# Patient Record
Sex: FEMALE | Race: WHITE | NOT HISPANIC OR LATINO | URBAN - METROPOLITAN AREA
[De-identification: names, ages, dates, MRNs, and addresses within clinical notes are randomized per-mention and may not be internally consistent; named-entity substitution may affect disease eponyms.]

---

## 2017-01-02 ENCOUNTER — TELEPHONE (OUTPATIENT)
Dept: PSYCHIATRY | Facility: CLINIC | Age: 52
End: 2017-01-02

## 2017-01-02 DIAGNOSIS — F90.9 ATTENTION DEFICIT HYPERACTIVITY DISORDER (ADHD), UNSPECIFIED ADHD TYPE: Primary | ICD-10-CM

## 2017-01-02 NOTE — TELEPHONE ENCOUNTER
Received RF request from Pt for:  Ritalin 20mg BID    Last RF:  Per [place]  12/4/16 30 day supply  Per Med tab: 10/20/16 3 copies of rx printed     Due for RF:  yes    Appointment History:  Last appt: 10/21/16  RTC: 1 mo  Cancel: 11/18/16  No-show: none  Next appt: 1/20/16    Routed to provider due to delay RTC timeframe.

## 2017-01-02 NOTE — TELEPHONE ENCOUNTER
----- Message from Sumaya Slater RN sent at 1/2/2017  4:13 PM CST -----  Regarding: FW: Rx refill      ----- Message -----     From: Lauri Xiong     Sent: 1/2/2017   4:10 PM       To: Kaitlynn Zaman RN  Subject: Rx refill                                        Caller:  Pt    Medication:  Ritalin    Pharmacy and location[facility]  [facility] [address] [facility]    Have you contacted the pharmacy: yes    How much of medication do you have left:  Almost out- two days    Pending appt: 1/20/17    Okay to leave VM:  Yes    Pt requests that the script be mailed, address in system is correct.

## 2017-01-03 RX ORDER — METHYLPHENIDATE HYDROCHLORIDE EXTENDED RELEASE 20 MG/1
20 TABLET ORAL 2 TIMES DAILY
Qty: 60 TABLET | Refills: 0 | Status: SHIPPED | OUTPATIENT
Start: 2017-01-03 | End: 2017-01-20

## 2017-01-03 NOTE — TELEPHONE ENCOUNTER
Bree Arredondo MD     Sent: Tue January 03, 2017 10:57 AM       To: Ayla Lopez, RN              Message        AVERY I'm covering Yuki               Ok to do 30 day supply.

## 2017-01-03 NOTE — TELEPHONE ENCOUNTER
LVM for patient to let her know I would put the RX in the mail to be sent to her home address. Instructed her to call the clinic back if she would like us to do something else with the rx.

## 2017-01-20 ENCOUNTER — OFFICE VISIT (OUTPATIENT)
Dept: PSYCHIATRY | Facility: CLINIC | Age: 52
End: 2017-01-20
Attending: PSYCHIATRY & NEUROLOGY
Payer: COMMERCIAL

## 2017-01-20 VITALS — WEIGHT: 281.2 LBS | SYSTOLIC BLOOD PRESSURE: 116 MMHG | DIASTOLIC BLOOD PRESSURE: 78 MMHG | HEART RATE: 76 BPM

## 2017-01-20 DIAGNOSIS — F41.1 GENERALIZED ANXIETY DISORDER: ICD-10-CM

## 2017-01-20 DIAGNOSIS — F33.0 MAJOR DEPRESSIVE DISORDER, RECURRENT EPISODE, MILD (H): ICD-10-CM

## 2017-01-20 DIAGNOSIS — F90.9 ATTENTION DEFICIT HYPERACTIVITY DISORDER (ADHD), UNSPECIFIED ADHD TYPE: Primary | ICD-10-CM

## 2017-01-20 PROCEDURE — 99212 OFFICE O/P EST SF 10 MIN: CPT | Mod: ZF

## 2017-01-20 RX ORDER — METHYLPHENIDATE HYDROCHLORIDE EXTENDED RELEASE 20 MG/1
20 TABLET ORAL 2 TIMES DAILY
Qty: 60 TABLET | Refills: 0 | Status: SHIPPED | OUTPATIENT
Start: 2017-03-03 | End: 2017-01-20

## 2017-01-20 RX ORDER — METHYLPHENIDATE HYDROCHLORIDE EXTENDED RELEASE 20 MG/1
20 TABLET ORAL 2 TIMES DAILY
Qty: 60 TABLET | Refills: 0 | Status: SHIPPED | OUTPATIENT
Start: 2017-02-03 | End: 2017-04-13

## 2017-01-20 RX ORDER — METHYLPHENIDATE HYDROCHLORIDE EXTENDED RELEASE 20 MG/1
20 TABLET ORAL 2 TIMES DAILY
Qty: 60 TABLET | Refills: 0 | Status: SHIPPED | OUTPATIENT
Start: 2017-03-03 | End: 2017-07-27

## 2017-01-20 RX ORDER — GABAPENTIN 100 MG/1
CAPSULE ORAL
Qty: 210 CAPSULE | Refills: 5 | Status: SHIPPED | OUTPATIENT
Start: 2017-01-20 | End: 2017-04-24

## 2017-01-20 RX ORDER — METHYLPHENIDATE HYDROCHLORIDE EXTENDED RELEASE 20 MG/1
20 TABLET ORAL 2 TIMES DAILY
Qty: 60 TABLET | Refills: 0 | Status: SHIPPED | OUTPATIENT
Start: 2017-02-03 | End: 2017-01-20

## 2017-01-20 RX ORDER — ESCITALOPRAM OXALATE 20 MG/1
20 TABLET ORAL DAILY
Qty: 30 TABLET | Refills: 5 | Status: SHIPPED | OUTPATIENT
Start: 2017-01-20 | End: 2017-04-24

## 2017-01-20 NOTE — NURSING NOTE
Chief Complaint   Patient presents with     Recheck Medication   MDD    Reviewed allergies, smoking status, and pharmacy preference  Administered abuse screening questions   Obtained weight, blood pressure and heart rate
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
## 2017-04-13 ENCOUNTER — TELEPHONE (OUTPATIENT)
Dept: PSYCHIATRY | Facility: CLINIC | Age: 52
End: 2017-04-13

## 2017-04-13 DIAGNOSIS — F90.9 ATTENTION DEFICIT HYPERACTIVITY DISORDER (ADHD), UNSPECIFIED ADHD TYPE: ICD-10-CM

## 2017-04-13 RX ORDER — METHYLPHENIDATE HYDROCHLORIDE EXTENDED RELEASE 20 MG/1
20 TABLET ORAL 2 TIMES DAILY
Qty: 60 TABLET | Refills: 0 | Status: SHIPPED | OUTPATIENT
Start: 2017-04-13 | End: 2017-04-24

## 2017-04-13 NOTE — TELEPHONE ENCOUNTER
-Script signed by Dr. Browne  -Mailed to pt's home address:    [address] [place] [address]  -Pt notified via   -Clinic number provided for c/b if needed before next appt.

## 2017-04-13 NOTE — TELEPHONE ENCOUNTER
FW: Refill Request  Received: Today       Sumaya Slater, Leah Manley RN       Phone Number: [phone]                       Previous Messages       ----- Message -----      From: Shannon Thorpe      Sent: 4/13/2017  10:53 AM        To: Kaitlynn Zaman RN   Subject: Refill Request                                   Caller:  patient   Medication:  Ritalin 20 mg   Pharmacy and location:  Mailed to home address (confirmed in EPIC)   Have you contacted the pharmacy: na   How much of medication do you have left:  Almost out   Pending appt: 4/24/17   Okay to leave VM:  yes

## 2017-04-13 NOTE — TELEPHONE ENCOUNTER
Last seen: 1/20/17  RTC: 2 months  Cancel: 2/17/17 (d/t illness)  No-show: none  Next appt: 4/24/17    Incoming refill from pt via phone    Medication requested: Metadate ER 20 mg tab  Directions: Take 1 tab BID  Qty: 60  Last refilled: 3/15/17    Will notify Dr. Bowman to seek approval to RF d/t missed appt.

## 2017-04-13 NOTE — TELEPHONE ENCOUNTER
Message  Received: Today       Maria D Bowman MD Blake, Katherine J RN       Caller: Unspecified (Today, 12:09 PM)                     I am fine with it. Pls print under Dr Browne.

## 2017-04-24 ENCOUNTER — OFFICE VISIT (OUTPATIENT)
Dept: PSYCHIATRY | Facility: CLINIC | Age: 52
End: 2017-04-24
Attending: PSYCHIATRY & NEUROLOGY
Payer: COMMERCIAL

## 2017-04-24 VITALS — WEIGHT: 219.6 LBS | DIASTOLIC BLOOD PRESSURE: 80 MMHG | SYSTOLIC BLOOD PRESSURE: 120 MMHG | HEART RATE: 86 BPM

## 2017-04-24 DIAGNOSIS — F90.9 ATTENTION DEFICIT HYPERACTIVITY DISORDER (ADHD), UNSPECIFIED ADHD TYPE: ICD-10-CM

## 2017-04-24 DIAGNOSIS — F33.0 MAJOR DEPRESSIVE DISORDER, RECURRENT EPISODE, MILD (H): ICD-10-CM

## 2017-04-24 DIAGNOSIS — F41.1 GENERALIZED ANXIETY DISORDER: ICD-10-CM

## 2017-04-24 PROCEDURE — 99212 OFFICE O/P EST SF 10 MIN: CPT | Mod: ZF

## 2017-04-24 RX ORDER — METHYLPHENIDATE HYDROCHLORIDE EXTENDED RELEASE 20 MG/1
20 TABLET ORAL 2 TIMES DAILY
Qty: 60 TABLET | Refills: 0 | Status: SHIPPED | OUTPATIENT
Start: 2017-06-24 | End: 2017-08-01

## 2017-04-24 RX ORDER — METHYLPHENIDATE HYDROCHLORIDE EXTENDED RELEASE 20 MG/1
20 TABLET ORAL 2 TIMES DAILY
Qty: 60 TABLET | Refills: 0 | Status: SHIPPED | OUTPATIENT
Start: 2017-05-24 | End: 2017-04-24

## 2017-04-24 RX ORDER — ESCITALOPRAM OXALATE 20 MG/1
20 TABLET ORAL DAILY
Qty: 30 TABLET | Refills: 5 | Status: SHIPPED | OUTPATIENT
Start: 2017-04-24 | End: 2017-08-01

## 2017-04-24 RX ORDER — METHYLPHENIDATE HYDROCHLORIDE EXTENDED RELEASE 20 MG/1
20 TABLET ORAL 2 TIMES DAILY
Qty: 60 TABLET | Refills: 0 | Status: SHIPPED | OUTPATIENT
Start: 2017-04-24 | End: 2017-04-24

## 2017-04-24 RX ORDER — GABAPENTIN 100 MG/1
CAPSULE ORAL
Qty: 210 CAPSULE | Refills: 5 | Status: SHIPPED | OUTPATIENT
Start: 2017-04-24 | End: 2017-08-01
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
## 2017-04-24 NOTE — MR AVS SNAPSHOT
After Visit Summary   4/24/2017    yJothi Pierre    MRN: [id number]           Patient Information     Date Of Birth          1965        Visit Information        Provider Department      4/24/2017 3:30 PM Maria D Bowman MD Psychiatry Clinic [facility]      Today's Diagnoses     Attention deficit hyperactivity disorder (ADHD), unspecified ADHD type        Generalized anxiety disorder        Major depressive disorder, recurrent episode, mild (H)           Follow-ups after your visit        Your next 10 appointments already scheduled     Jun 14, 2017  8:15 AM CDT   Adult Med Follow UP with Maria D Bowman MD   Psychiatry Clinic ([facility])    [address]  [address] [facility] [address] [phone]              Who to contact     Please call your clinic at [phone] to:    Ask questions about your health    Make or cancel appointments    Discuss your medicines    Learn about your test results    Speak to your doctor   If you have compliments or concerns about an experience at your clinic, or if you wish to file a complaint, please contact [facility] Patient Relations at [phone] or email us at [email or web address]         Additional Information About Your Visit        MyChart Information     [employer] gives you secure access to your electronic health record. If you see a primary care provider, you can also send messages to your care team and make appointments. If you have questions, please call your primary care clinic.  If you do not have a primary care provider, please call [phone] and they will assist you.      PPLCONNECT is an electronic gateway that provides easy, online access to your medical records. With PPLCONNECT, you can request a clinic appointment, read your test results, renew a prescription or communicate with your care team.     To access your existing account,  please contact your [facility] or call [phone] for assistance.        Care EveryWhere ID     This is your Care EveryWhere ID. This could be used by other organizations to access your [place] medical records  [id number]        Your Vitals Were     Pulse                   86            Blood Pressure from Last 3 Encounters:   04/24/17 120/80   01/20/17 116/78   10/21/16 118/80    Weight from Last 3 Encounters:   04/24/17 99.6 kg (219 lb 9.6 oz)   01/20/17 127.6 kg (281 lb 3.2 oz)   10/21/16 96.9 kg (213 lb 9.6 oz)              Today, you had the following     No orders found for display         Today's Medication Changes          These changes are accurate as of: 4/24/17 11:59 PM.  If you have any questions, ask your nurse or doctor.               These medicines have changed or have updated prescriptions.        Dose/Directions    * methylphenidate 20 MG CR tablet   Commonly known as:  METADATE ER   This may have changed:  Another medication with the same name was added. Make sure you understand how and when to take each.   Used for:  Attention deficit hyperactivity disorder (ADHD), unspecified ADHD type   Changed by:  Maria D Bowman MD        Dose:  20 mg   Take 1 tablet (20 mg) by mouth 2 times daily In the morning and at noon   Quantity:  60 tablet   Refills:  0       * methylphenidate 20 MG CR tablet   Commonly known as:  METADATE ER   This may have changed:  You were already taking a medication with the same name, and this prescription was added. Make sure you understand how and when to take each.   Used for:  Attention deficit hyperactivity disorder (ADHD), unspecified ADHD type   Changed by:  Maria D Bowman MD        Dose:  20 mg   Start taking on:  6/24/2017   Take 1 tablet (20 mg) by mouth 2 times daily In the morning and at noon   Quantity:  60 tablet   Refills:  0       * Notice:  This list has 2 medication(s) that are the same as other  medications prescribed for you. Read the directions carefully, and ask your doctor or other care provider to review them with you.         Where to get your medicines      These medications were sent to [id number] - [place] - [address] AT [facility] [address]  [address] [place] [address]     Phone:  [phone]     escitalopram 20 MG tablet    gabapentin 100 MG capsule         Some of these will need a paper prescription and others can be bought over the counter.  Ask your nurse if you have questions.     Bring a paper prescription for each of these medications     methylphenidate 20 MG CR tablet                Primary Care Provider Office Phone # Fax #    Jn Betts [phone]       [facility] [address] [place] [address]        Thank you!     Thank you for choosing PSYCHIATRY CLINIC  for your care. Our goal is always to provide you with excellent care. Hearing back from our patients is one way we can continue to improve our services. Please take a few minutes to complete the written survey that you may receive in the mail after your visit with us. Thank you!             Your Updated Medication List - Protect others around you: Learn how to safely use, store and throw away your medicines at www.disposemymeds.org.          This list is accurate as of: 4/24/17 11:59 PM.  Always use your most recent med list.                   Brand Name Dispense Instructions for use    BENADRYL PO      Take 50 mg by mouth At Bedtime       escitalopram 20 MG tablet    LEXAPRO    30 tablet    Take 1 tablet (20 mg) by mouth daily       gabapentin 100 MG capsule    NEURONTIN    210 capsule    Take 2 capsules (200 mg) in the morning, 3 capsules (300 mg) at noon, and 2 capsules (200 mg) at bedtime       * methylphenidate 20 MG CR tablet    METADATE ER    60 tablet    Take 1 tablet (20 mg) by mouth 2 times daily In the morning and at noon        * methylphenidate 20 MG CR tablet   Start taking on:  6/24/2017    METADATE ER    60 tablet    Take 1 tablet (20 mg) by mouth 2 times daily In the morning and at noon       order for DME     1 Device    Equipment being ordered: full spectrum 10,000 lux light. Use 30 min every morning in fall and winter months.       * Notice:  This list has 2 medication(s) that are the same as other medications prescribed for you. Read the directions carefully, and ask your doctor or other care provider to review them with you.

## 2017-06-03 ENCOUNTER — HEALTH MAINTENANCE LETTER (OUTPATIENT)
Age: 52
End: 2017-06-03

## 2017-07-24 ENCOUNTER — TELEPHONE (OUTPATIENT)
Dept: PSYCHIATRY | Facility: CLINIC | Age: 52
End: 2017-07-24

## 2017-07-24 DIAGNOSIS — F90.9 ATTENTION DEFICIT HYPERACTIVITY DISORDER (ADHD), UNSPECIFIED ADHD TYPE: ICD-10-CM

## 2017-07-24 NOTE — TELEPHONE ENCOUNTER
Last seen: 4/24/17 (Gracie)  RTC:  2 months  Cancel: none  No-show: 6/14/17  Next appt: 8/1/17 (tx to [facility])     Per last office note:  Continue Metadate to 20 mg BID ER tablet - not capsule. (3 months prescription sent with pt today)    Per [place]-, 30 d/s Metadate last filled: 6/17/17, 5/26/17, 4/17/17    Routed to Fior HUGGINS CNP
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
## 2017-07-27 RX ORDER — METHYLPHENIDATE HYDROCHLORIDE EXTENDED RELEASE 20 MG/1
20 TABLET ORAL 2 TIMES DAILY
Qty: 10 TABLET | Refills: 0 | Status: SHIPPED | OUTPATIENT
Start: 2017-07-27 | End: 2017-08-01

## 2017-07-27 NOTE — TELEPHONE ENCOUNTER
Following consultation with Dr. Bowman.  Fior authorizes refill just until next appt.    Script printed and routed to Fior Bazan for signature.

## 2017-07-28 NOTE — TELEPHONE ENCOUNTER
Rx signed by Fior Bazan.    Left detailed VM for pt with update that Rx is signed and writer is requesting a c/b to see if she would like to p/u Rx or have this mailed.

## 2017-08-01 ENCOUNTER — OFFICE VISIT (OUTPATIENT)
Dept: PSYCHIATRY | Facility: CLINIC | Age: 52
End: 2017-08-01
Attending: NURSE PRACTITIONER
Payer: COMMERCIAL

## 2017-08-01 VITALS — SYSTOLIC BLOOD PRESSURE: 125 MMHG | DIASTOLIC BLOOD PRESSURE: 85 MMHG | HEART RATE: 76 BPM | WEIGHT: 217 LBS

## 2017-08-01 DIAGNOSIS — F41.1 GENERALIZED ANXIETY DISORDER: ICD-10-CM

## 2017-08-01 DIAGNOSIS — F33.0 MAJOR DEPRESSIVE DISORDER, RECURRENT EPISODE, MILD (H): ICD-10-CM

## 2017-08-01 DIAGNOSIS — F90.9 ATTENTION DEFICIT HYPERACTIVITY DISORDER (ADHD), UNSPECIFIED ADHD TYPE: ICD-10-CM

## 2017-08-01 PROCEDURE — 99212 OFFICE O/P EST SF 10 MIN: CPT | Mod: ZF

## 2017-08-01 RX ORDER — METHYLPHENIDATE HYDROCHLORIDE EXTENDED RELEASE 20 MG/1
20 TABLET ORAL 2 TIMES DAILY
Qty: 60 TABLET | Refills: 0 | Status: SHIPPED | OUTPATIENT
Start: 2017-08-01 | End: 2017-09-22

## 2017-08-01 RX ORDER — GABAPENTIN 100 MG/1
CAPSULE ORAL
Qty: 210 CAPSULE | Refills: 1 | Status: SHIPPED | OUTPATIENT
Start: 2017-08-01 | End: 2017-09-22

## 2017-08-01 RX ORDER — ESCITALOPRAM OXALATE 20 MG/1
20 TABLET ORAL DAILY
Qty: 30 TABLET | Refills: 1 | Status: SHIPPED | OUTPATIENT
Start: 2017-08-01 | End: 2017-09-22
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
## 2017-08-01 NOTE — TELEPHONE ENCOUNTER
Fior Bazan, APRN [facility], JONATAN                     She never called you back re: the partial fill. I just saw her.     She accepted two full months of print for Metadate. Can you destroy the RX from 7/27/17?         Script shredded.

## 2017-08-01 NOTE — MR AVS SNAPSHOT
After Visit Summary   8/1/2017    Jyothi Pierre    MRN: [id number]           Patient Information     Date Of Birth          1965        Visit Information        Provider Department      8/1/2017 1:00 PM Fior Bazan APRN [facility]        Today's Diagnoses     Attention deficit hyperactivity disorder (ADHD), unspecified ADHD type        Generalized anxiety disorder        Major depressive disorder, recurrent episode, mild (H)           Follow-ups after your visit        Follow-up notes from your care team     Return in about 8 weeks (around 9/26/2017), or if symptoms worsen or fail to improve.      Who to contact     Please call your clinic at [phone] to:    Ask questions about your health    Make or cancel appointments    Discuss your medicines    Learn about your test results    Speak to your doctor   If you have compliments or concerns about an experience at your clinic, or if you wish to file a complaint, please contact [facility] Patient Relations at [phone] or email us at [email or web address]         Additional Information About Your Visit        MyChart Information     Ts[employer]ink gives you secure access to your electronic health record. If you see a primary care provider, you can also send messages to your care team and make appointments. If you have questions, please call your primary care clinic.  If you do not have a primary care provider, please call [phone] and they will assist you.      Tsukulink is an electronic gateway that provides easy, online access to your medical records. With [employer]ink, you can request a clinic appointment, read your test results, renew a prescription or communicate with your care team.     To access your existing account, please contact your [facility] or call [phone] for assistance.        Care EveryWhere ID     This is your Care EveryWhere ID. This could be  used by other organizations to access your [place] medical records  [id number]        Your Vitals Were     Pulse                   76            Blood Pressure from Last 3 Encounters:   08/01/17 125/85   04/24/17 120/80   01/20/17 116/78    Weight from Last 3 Encounters:   08/01/17 98.4 kg (217 lb)   04/24/17 99.6 kg (219 lb 9.6 oz)   01/20/17 127.6 kg (281 lb 3.2 oz)              Today, you had the following     No orders found for display         Where to get your medicines      These medications were sent to [id number] - [place] - [address] [facility] [address]  [address] [address]     Phone:  [phone]     escitalopram 20 MG tablet    gabapentin 100 MG capsule         Some of these will need a paper prescription and others can be bought over the counter.  Ask your nurse if you have questions.     Bring a paper prescription for each of these medications     methylphenidate 20 MG CR tablet    methylphenidate 20 MG CR tablet          Primary Care Provider Office Phone # Fax #    Jn Betts [phone]       [facility] [address] [place] [address]        Equal Access to Services     CARLOS LYNN AH: Hadii chio alyo Sograemeali, waaxda luqadaha, qaybta kaalmada adeegyada, monserrat falcon haymeghan jensen. So [facility] [phone].    ATENCIÓN: Si habla español, tiene a santana disposición servicios gratuitos de asistencia lingüística. ame al [phone].    We comply with applicable federal civil rights laws and [place] laws. We do not discriminate on the basis of race, color, national origin, age, disability sex, sexual orientation or gender identity.            Thank you!     Thank you for choosing PSYCHIATRY CLINIC  for your care. Our goal is always to provide you with excellent care. Hearing back from our patients is one way we can continue to improve our services. Please take a few minutes to  complete the written survey that you may receive in the mail after your visit with us. Thank you!             Your Updated Medication List - Protect others around you: Learn how to safely use, store and throw away your medicines at www.disposemymeds.org.          This list is accurate as of: 8/1/17 11:59 PM.  Always use your most recent med list.                   Brand Name Dispense Instructions for use Diagnosis    BENADRYL PO      Take 50 mg by mouth At Bedtime        escitalopram 20 MG tablet    LEXAPRO    30 tablet    Take 1 tablet (20 mg) by mouth daily    Major depressive disorder, recurrent episode, mild (H)       gabapentin 100 MG capsule    NEURONTIN    210 capsule    Take 2 capsules (200 mg) in the morning, 3 capsules (300 mg) at noon, and 2 capsules (200 mg) at bedtime    Generalized anxiety disorder       * methylphenidate 20 MG CR tablet    METADATE ER    60 tablet    Take 1 tablet (20 mg) by mouth 2 times daily In the morning and at noon    Attention deficit hyperactivity disorder (ADHD), unspecified ADHD type       * methylphenidate 20 MG CR tablet    METADATE ER    60 tablet    Take 1 tablet (20 mg) by mouth 2 times daily In the morning and at noon    Attention deficit hyperactivity disorder (ADHD), unspecified ADHD type       order for DME     1 Device    Equipment being ordered: full spectrum 10,000 lux light. Use 30 min every morning in fall and winter months.    Major depressive disorder, recurrent episode, mild (H)       * Notice:  This list has 2 medication(s) that are the same as other medications prescribed for you. Read the directions carefully, and ask your doctor or other care provider to review them with you.

## 2017-08-01 NOTE — NURSING NOTE
Chief Complaint   Patient presents with     Recheck Medication     ADHD    Reviewed allergies, smoking status, and pharmacy preference  Administered abuse screening question  Obtained weight, blood pressure and heart rate

## 2017-08-02 ASSESSMENT — PATIENT HEALTH QUESTIONNAIRE - PHQ9: SUM OF ALL RESPONSES TO PHQ QUESTIONS 1-9: 7

## 2017-09-19 ENCOUNTER — TELEPHONE (OUTPATIENT)
Dept: PSYCHIATRY | Facility: CLINIC | Age: 52
End: 2017-09-19

## 2017-09-19 NOTE — TELEPHONE ENCOUNTER
"Shannon Thorpe Michelle, RN        Phone Number: [phone]                     Caller:  patient   Medication:  gabapentin   Pharmacy and location:  [facility]   Have you contacted the pharmacy: no   How much of medication do you have left:  \"Out/almost out/should have been out a while ago\"   Pending appt: 9/22/17   Okay to leave VM:  yes     Patient says that she did not contact her pharmacy because they are very \"disorganized right now\"       Last seen: 8/1/17  RTC: 8 weeks  Cancel: none  No-show: none  Next appt: 9/22/17     Last prescribed:    gabapentin (NEURONTIN) 100 MG capsule 210 capsule 1 8/1/2017  No   Sig: Take 2 capsules (200 mg) in the morning, 3 capsules (300 mg) at noon, and 2 capsules (200 mg) at bedtime   Class: E-Prescribe     [facility] have [facility].  Writer was informed that pt obtained #210 - 100 mg caps of Gabapentin on 8/23/17 and 8/7/17.  Pt should not be out of medication at this time.  Pharmacy also notes that there are several remaining refills under Dr. Hwang on profile.    Returned call to pt who states that she does not need a refill at this time and has enough to get to next appt with provider.  Again states that her pharmacy is very disorganized.  Pt appears to be at work as multiple voices were overheard in background, did not inquire further due to potential privacy issue.    Routed to provider as AVERY   "

## 2017-09-22 ENCOUNTER — OFFICE VISIT (OUTPATIENT)
Dept: PSYCHIATRY | Facility: CLINIC | Age: 52
End: 2017-09-22
Attending: NURSE PRACTITIONER
Payer: COMMERCIAL

## 2017-09-22 VITALS — SYSTOLIC BLOOD PRESSURE: 126 MMHG | WEIGHT: 214.8 LBS | HEART RATE: 88 BPM | DIASTOLIC BLOOD PRESSURE: 86 MMHG

## 2017-09-22 DIAGNOSIS — F41.1 GENERALIZED ANXIETY DISORDER: ICD-10-CM

## 2017-09-22 DIAGNOSIS — F90.9 ATTENTION DEFICIT HYPERACTIVITY DISORDER (ADHD), UNSPECIFIED ADHD TYPE: ICD-10-CM

## 2017-09-22 DIAGNOSIS — F33.0 MAJOR DEPRESSIVE DISORDER, RECURRENT EPISODE, MILD (H): ICD-10-CM

## 2017-09-22 PROCEDURE — 99212 OFFICE O/P EST SF 10 MIN: CPT | Mod: ZF

## 2017-09-22 RX ORDER — METHYLPHENIDATE HYDROCHLORIDE EXTENDED RELEASE 20 MG/1
20 TABLET ORAL 2 TIMES DAILY
Qty: 60 TABLET | Refills: 0 | Status: SHIPPED | OUTPATIENT
Start: 2017-09-22 | End: 2017-11-21

## 2017-09-22 RX ORDER — ESCITALOPRAM OXALATE 20 MG/1
20 TABLET ORAL DAILY
Qty: 30 TABLET | Refills: 1 | Status: SHIPPED | OUTPATIENT
Start: 2017-09-22 | End: 2017-11-21

## 2017-09-22 RX ORDER — GABAPENTIN 100 MG/1
CAPSULE ORAL
Qty: 210 CAPSULE | Refills: 1 | Status: SHIPPED | OUTPATIENT
Start: 2017-09-22 | End: 2017-11-21

## 2017-09-22 NOTE — PATIENT INSTRUCTIONS
Thank you for coming to the PSYCHIATRY CLINIC.    Lab Testing:  If you had lab testing today and your results are reassuring or normal they will be mailed to you or sent through [employer] within 7 days.   If the lab tests need quick action we will call you with the results.  The phone number we will call with results is # [phone] (home) . If this is not the best number please call our clinic and change the number.    Medication Refills:  If you need any refills please call your pharmacy and they will contact us. Our fax number for refills is [phone]. Please allow three business for refill processing.   If you need to  your refill at a new pharmacy, please contact the new pharmacy directly. The new pharmacy will help you get your medications transferred.     Scheduling:  If you have any concerns about today's visit or wish to schedule another appointment please call our office during normal business hours [phone] (8-5:00 M-F)    Contact Us:  Please call [phone] during business hours (8-5:00 M-F).  If after clinic hours, or on the weekend, please call  [phone].    Financial Assistance [phone]  [facility] Billing [phone]  [facility] Billing [phone]  Medical Records [phone]      MENTAL HEALTH CRISIS NUMBERS:  [facility]:   [facility] - [phone]   Crisis Residence [facility] - [phone]   Walk-In Counseling [facility] [phone]   COPE 24/7 [facility] Mobile Team for Adults - [[phone]]; Child - [[phone]]     Crisis Connection - [phone]     [facility]:   [facility] - [phone]   Walk-in counseling [facility] - [phone]   Walk-in counseling [facility] - [phone]   Crisis Residence [facility] - [phone]   Urgent Care Adult Mental Health:   --Drop-in, 24/7 crisis line, and [employer] [employer] Mobile Team [[phone]]    CRISIS TEXT  LINE: Text [phone] from anywhere, anytime, any crisis 24/7;    OR SEE www.crisistextline.org     Poison Control Center - [phone]    CHILD: [facility] needs assessment team - [phone]     [facility] - [phone]; or [employer] Lifeline - [phone]    If you have a medical emergency please call 911or go to the nearest ER.                    _____________________________________________    Again thank you for choosing PSYCHIATRY CLINIC and please let us know how we can best partner with you to improve you and your family's health.  You may be receiving a survey in the mail regarding this appointment. We would love to have your feedback, both positive and negative, so please fill out the survey and return it using the provided envolpe. The survey is done by an external company, so your answers are anonymous.

## 2017-09-22 NOTE — PROGRESS NOTES
Outpatient Psychiatry Progress Note     Provider: JOSELUIS Roldan CNP  Date: 2017  Service:  Medication management.   Patient Identification: Jyothi Pierre  : 1965   MRN: [id number]    Jyothi Pierre is a 52 year old female who presents for ongoing psychiatric care.  Jyothi Pierre was last seen in clinic on 17 for first med visit. She was previously seen in resident clinic, most recently by Dr. Bowman.      At that time, she chose to continue Metadate ER 20 mg BID, Gabapentin 200 mg qAM and qHS with 300 mg qNoon, Lexapro 20 mg daily, Benadryl 50 mg qHS PRN.    2017  Today Jyothi reports she started back to school as a  in 6th, 7th, 8th grades specializing in EBD. She just got a positive evaluation from a supervisor up a couple levels for her position who was concerned she was working too hard and skipping her lunch.     Grieving the loss of a family friend who  last week in a MVA. She reports her grief response is often a delayed emotional response.    Enjoyed traveling in [place] with her  this summer.    Her 26yo daughter is starting her Master's in Education with a focus on Autism. Her 26yo daughter has her first grandchild, August, two months ago. Her 21yo son is a genoveva at [employer].    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 She continues physical therapy for her back.    She reports daily med compliance and denies side effects of medication.    Psychiatric Review of Systems:  Overall, her mood is pretty good. She is concerned that gabapentin causes her to gain weight; she takes 100mg (2qAM, 3qNoon, 2qHS for anxiety).    Depression: denies including irritability  Anxiety: dissociates in the shower s/t trauma history unless she talks herself through every step    She denies lethality.     Review of Medical Systems:  Appetite: really good, weight is stable within 3#  Sleep: with CPAP and Benadryl PRN, sleeps intact all  night  Self Care: encouraged, wants to restart walking, does wood working with her , enjoys her Yazdanism  Housework and hygiene: managing  Energy: intact  Concentration: intact    Current Substance Use:  Social History     Social History     Marital status:      Spouse name: N/A     Number of children: N/A     Years of education: N/A     Social History Main Topics     Smoking status: Former Smoker     Types: Cigarettes     Quit date: 4/1/2013     Smokeless tobacco: None      Comment: quit last week (4/1/13)     Alcohol use No     Drug use: No     Sexual activity: Not Asked     Other Topics Concern     None     Social History Narrative     Nicotine: quit in 2012  Alcohol: sober since 2004    Limiting caffeine in favor of carbonated water    Past Medical History:   Past Medical History:   Diagnosis Date     Chronic kidney disease      Depressive disorder, not elsewhere classified 11/8/2012     Renal colic      Medical health update: physical therapy weekly for her back; anticipates starting weekly traction    Allergies:   Allergies   Allergen Reactions     Nubain [Nalbuphine Hcl]      Percocet [Oxycodone-Acetaminophen] Swelling     Toradol Other (See Comments)     Headache     Adderall Rash     Compazine Anxiety        Current Medications     Current Outpatient Prescriptions Ordered in [facility]   Medication Sig Dispense Refill     methylphenidate (METADATE ER) 20 MG CR tablet Take 1 tablet (20 mg) by mouth 2 times daily In the morning and at noon 60 tablet 0     gabapentin (NEURONTIN) 100 MG capsule Take 2 capsules (200 mg) in the morning, 3 capsules (300 mg) at noon, and 2 capsules (200 mg) at bedtime 210 capsule 1     escitalopram (LEXAPRO) 20 MG tablet Take 1 tablet (20 mg) by mouth daily 30 tablet 1     methylphenidate (METADATE ER) 20 MG CR tablet Take 1 tablet (20 mg) by mouth 2 times daily In the morning and at noon 60 tablet 0     order for DME Equipment being ordered: full spectrum 10,000 lux light.  "Use 30 min every morning in fall and winter months. 1 Device 0     DiphenhydrAMINE HCl (BENADRYL PO) Take 50 mg by mouth At Bedtime       No current Epic-ordered facility-administered medications on file.       Mental Status Exam     Appearance:  Casually dressed and Adequately groomed  Behavior/relationship to examiner/demeanor:  Cooperative, Engaged and Pleasant  Orientation: Oriented to person, place, time and situation  Psychomotor: WNL  Speech Rate:  Normal  Speech Spontaneity:  Normal  Mood:  \"good\"  Affect:  Appropriate/mood-congruent  Thought Process (Associations):  Logical, Linear and Goal directed  Thought Content:  no evidence of suicidal or homicidal ideation, denies suicidal ideation, intent or thoughts and patient denies auditory and visual hallucinations  Abnormal Perception:  None  Attention/Concentration:  Normal  Language:  Intact  Insight:  Good  Judgment:  Good      Results     Vital signs: /86  Pulse 88  Wt 97.4 kg (214 lb 12.8 oz)    Laboratory Data:   Lab Results   Component Value Date    WBC 5.0 12/11/2015    HGB 14.5 12/11/2015    HCT 43.1 12/11/2015     12/11/2015    ALT 30 12/11/2015    AST 15 12/11/2015     12/11/2015    BUN 11 12/11/2015    CO2 28 12/11/2015    TSH 1.99 12/11/2015    INR 1.11 04/06/2010     Assessment & Plan      Jyothi Pierre is seen today for follow up.    Diagnosis  Axis 1: PTSD, ADHD  Axis 2: BPD per history    Plan:  Medication: continue Metadate ER 20 mg BID, Gabapentin 200 mg qAM and qHS with 300 mg qNoon, Lexapro 20 mg daily  OTC Recommendations: Benadryl 50mg qHS PRN  Lab Orders: none  Referrals: she considers rescheduling with Cristela  Release of Information: none  Future Treatment Considerations: none  Return for Follow Up: 8 weeks, sooner as needd    She voices understanding of the treatment plan including discussion of options, risks/ benefits. She has clinic contact information and will seek emergency services if urgent or life " threatening symptoms present. She understands risks associated with drug and alcohol use.     Over 50% of this time was spent counseling the patient and/or coordinating care regarding review of social and occupational functioning.  In addition patient was counseled on health and wellness practices to augment medication treatment of symptoms. See note for details.    PHQ-9 score:  2  PHQ-9 SCORE 8/1/2017   Total Score -   Total Score 7     GAD7: No flowsheet data found.  : 09/2017  JOSELUIS Roldan CNP 9/22/2017

## 2017-09-22 NOTE — MR AVS SNAPSHOT
After Visit Summary   9/22/2017    Jyothi Pierre    MRN: [id number]           Patient Information     Date Of Birth          1965        Visit Information        Provider Department      9/22/2017 4:00 PM Fior Bazan APRN [facility]        Today's Diagnoses     Attention deficit hyperactivity disorder (ADHD), unspecified ADHD type        Generalized anxiety disorder        Major depressive disorder, recurrent episode, mild (H)          Care Instructions    Thank you for coming to the PSYCHIATRY CLINIC.    Lab Testing:  If you had lab testing today and your results are reassuring or normal they will be mailed to you or sent through [email or web address] within 7 days.   If the lab tests need quick action we will call you with the results.  The phone number we will call with results is # [phone] (home) . If this is not the best number please call our clinic and change the number.    Medication Refills:  If you need any refills please call your pharmacy and they will contact us. Our fax number for refills is [phone]. Please allow three business for refill processing.   If you need to  your refill at a new pharmacy, please contact the new pharmacy directly. The new pharmacy will help you get your medications transferred.     Scheduling:  If you have any concerns about today's visit or wish to schedule another appointment please call our office during normal business hours [phone] (8-5:00 M-F)    Contact Us:  Please call [phone] during business hours (8-5:00 M-F).  If after clinic hours, or on the weekend, please call  [phone].    Financial Assistance [phone]  [facility] Billing [phone]  [place] Billing [phone]  Medical Records [phone]      MENTAL HEALTH CRISIS NUMBERS:  [facility]:   [facility] - [phone]   Crisis Residence [facility]idence - [phone]   Walk-In Counseling Center [facility] - [phone]    COPE 24/7 [facility] Mobile Team for Adults - [[phone]]; Child - [[phone]]     Crisis Connection - [phone]     [facility] - [phone]   Walk-in counseling [facility] - [phone]   Walk-in counseling [facility] - [phone]   Crisis Residence [facility] Residence - [phone]   Urgent Care Adult Mental Health:   --Drop-in, 24/7 crisis line, and [place] Mobile Team [[phone]]    CRISIS TEXT LINE: Text [phone] from anywhere, anytime, any crisis 24/7;    OR SEE www.crisistextline.org     Poison Control Center - [phone]    CHILD: [facility] needs assessment team - [phone]     [facility] Lifeline - [phone]; or [employer] Lifeline - [phone]    If you have a medical emergency please call 911or go to the nearest ER.                    _____________________________________________    Again thank you for choosing PSYCHIATRY CLINIC and please let us know how we can best partner with you to improve you and your family's health.  You may be receiving a survey in the mail regarding this appointment. We would love to have your feedback, both positive and negative, so please fill out the survey and return it using the provided envolpe. The survey is done by an external company, so your answers are anonymous.             Follow-ups after your visit        Your next 10 appointments already scheduled     Nov 24, 2017  4:00 PM CST   Adult Med Follow UP with JOSELUIS Panchal CNP   Psychiatry Clinic ([facility])    [address] [facility] [address] [phone]              Who to contact     Please call your clinic at [phone] to:    Ask questions about your health    Make or cancel appointments    Discuss your medicines    Learn about your test results    Speak to your doctor   If you have compliments or concerns about an experience  at your clinic, or if you wish to file a complaint, please contact [facility] Patient Relations at [phone] or email us at [email or web address]         Additional Information About Your Visit        [employer] Information     [employer]to gives you secure access to your electronic health record. If you see a primary care provider, you can also send messages to your care team and make appointments. If you have questions, please call your primary care clinic.  If you do not have a primary care provider, please call [phone] and they will assist you.      B[employer]to is an electronic gateway that provides easy, online access to your medical records. With [employer]to, you can request a clinic appointment, read your test results, renew a prescription or communicate with your care team.     To access your existing account, please contact your [facility] Clinic or call [phone] for assistance.        Care EveryWhere ID     This is your Care EveryWhere ID. This could be used by other organizations to access your [place] medical records  [id number]        Your Vitals Were     Pulse                   88            Blood Pressure from Last 3 Encounters:   09/22/17 126/86   08/01/17 125/85   04/24/17 120/80    Weight from Last 3 Encounters:   09/22/17 97.4 kg (214 lb 12.8 oz)   08/01/17 98.4 kg (217 lb)   04/24/17 99.6 kg (219 lb 9.6 oz)              Today, you had the following     No orders found for display         Where to get your medicines      These medications were sent to [facility] [id number]  [place] - [address] [facility] [address]  [address] [place] [address]     Phone:  [phone]     escitalopram 20 MG tablet    gabapentin 100 MG capsule         Some of these will need a paper prescription and others can be bought over the counter.  Ask your nurse if you have questions.     Bring a paper prescription for each of  these medications     methylphenidate 20 MG CR tablet    methylphenidate 20 MG CR tablet          Primary Care Provider Office Phone # Fax #    Jn Betts [phone]       [facility] [address] [place] [address]        Equal Access to Services     [facility] LEAH : Hadii aad ku hadasho Soomaali, waaxda luqadaha, qaybta kaalmada adeegyada, waxay odessain hayaan adehumberto guilloryrosyjohana khan . So [facility] [phone].    ATENCIÓN: Si habla español, tiene a santana disposición servicios gratuitos de asistencia lingüística. [facility] [phone].    We comply with applicable federal civil rights laws and [place] laws. We do not discriminate on the basis of race, color, national origin, age, disability sex, sexual orientation or gender identity.            Thank you!     Thank you for choosing PSYCHIATRY CLINIC  for your care. Our goal is always to provide you with excellent care. Hearing back from our patients is one way we can continue to improve our services. Please take a few minutes to complete the written survey that you may receive in the mail after your visit with us. Thank you!             Your Updated Medication List - Protect others around you: Learn how to safely use, store and throw away your medicines at www.disposemymeds.org.          This list is accurate as of: 9/22/17  4:40 PM.  Always use your most recent med list.                   Brand Name Dispense Instructions for use Diagnosis    BENADRYL PO      Take 50 mg by mouth At Bedtime        escitalopram 20 MG tablet    LEXAPRO    30 tablet    Take 1 tablet (20 mg) by mouth daily    Major depressive disorder, recurrent episode, mild (H)       gabapentin 100 MG capsule    NEURONTIN    210 capsule    Take 2 capsules (200 mg) in the morning, 3 capsules (300 mg) at noon, and 2 capsules (200 mg) at bedtime    Generalized anxiety disorder       * methylphenidate 20 MG CR tablet    METADATE ER    60 tablet    Take 1 tablet (20  mg) by mouth 2 times daily In the morning and at noon    Attention deficit hyperactivity disorder (ADHD), unspecified ADHD type       * methylphenidate 20 MG CR tablet    METADATE ER    60 tablet    Take 1 tablet (20 mg) by mouth 2 times daily In the morning and at noon    Attention deficit hyperactivity disorder (ADHD), unspecified ADHD type       order for DME     1 Device    Equipment being ordered: full spectrum 10,000 lux light. Use 30 min every morning in fall and winter months.    Major depressive disorder, recurrent episode, mild (H)       * Notice:  This list has 2 medication(s) that are the same as other medications prescribed for you. Read the directions carefully, and ask your doctor or other care provider to review them with you.

## 2017-09-25 ASSESSMENT — PATIENT HEALTH QUESTIONNAIRE - PHQ9: SUM OF ALL RESPONSES TO PHQ QUESTIONS 1-9: 2

## 2017-11-21 ENCOUNTER — OFFICE VISIT (OUTPATIENT)
Dept: PSYCHIATRY | Facility: CLINIC | Age: 52
End: 2017-11-21
Attending: NURSE PRACTITIONER
Payer: COMMERCIAL

## 2017-11-21 VITALS — WEIGHT: 219.4 LBS | HEART RATE: 81 BPM | DIASTOLIC BLOOD PRESSURE: 81 MMHG | SYSTOLIC BLOOD PRESSURE: 116 MMHG

## 2017-11-21 DIAGNOSIS — F90.9 ATTENTION DEFICIT HYPERACTIVITY DISORDER (ADHD), UNSPECIFIED ADHD TYPE: ICD-10-CM

## 2017-11-21 DIAGNOSIS — F41.1 GENERALIZED ANXIETY DISORDER: ICD-10-CM

## 2017-11-21 DIAGNOSIS — F33.0 MAJOR DEPRESSIVE DISORDER, RECURRENT EPISODE, MILD (H): ICD-10-CM

## 2017-11-21 PROCEDURE — 99212 OFFICE O/P EST SF 10 MIN: CPT | Mod: ZF

## 2017-11-21 RX ORDER — METHYLPHENIDATE HYDROCHLORIDE EXTENDED RELEASE 20 MG/1
20 TABLET ORAL 2 TIMES DAILY
Qty: 60 TABLET | Refills: 0 | Status: SHIPPED | OUTPATIENT
Start: 2017-11-21 | End: 2018-01-30

## 2017-11-21 RX ORDER — METHYLPHENIDATE HYDROCHLORIDE EXTENDED RELEASE 20 MG/1
20 TABLET ORAL 2 TIMES DAILY
Qty: 60 TABLET | Refills: 0 | Status: SHIPPED | OUTPATIENT
Start: 2017-11-21 | End: 2018-02-09

## 2017-11-21 RX ORDER — ESCITALOPRAM OXALATE 20 MG/1
20 TABLET ORAL DAILY
Qty: 30 TABLET | Refills: 1 | Status: SHIPPED | OUTPATIENT
Start: 2017-11-21 | End: 2018-01-23

## 2017-11-21 RX ORDER — GABAPENTIN 100 MG/1
CAPSULE ORAL
Qty: 210 CAPSULE | Refills: 1 | Status: SHIPPED | OUTPATIENT
Start: 2017-11-21 | End: 2018-02-09
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
## 2017-11-21 NOTE — MR AVS SNAPSHOT
After Visit Summary   11/21/2017    Jyothi Pierre    MRN: [id number]           Patient Information     Date Of Birth          1965        Visit Information        Provider Department      11/21/2017 3:00 PM Fior Bazan APRN [facility]        Today's Diagnoses     Attention deficit hyperactivity disorder (ADHD), unspecified ADHD type        Major depressive disorder, recurrent episode, mild (H)        Generalized anxiety disorder           Follow-ups after your visit        Follow-up notes from your care team     Return in about 8 weeks (around 1/16/2018), or if symptoms worsen or fail to improve, for Routine Visit.      Who to contact     Please call your clinic at [phone] to:    Ask questions about your health    Make or cancel appointments    Discuss your medicines    Learn about your test results    Speak to your doctor   If you have compliments or concerns about an experience at your clinic, or if you wish to file a complaint, please contact [facility] Patient Relations at [phone] or email us at [email or web address]         Additional Information About Your Visit        MyChart Information     Twillion gives you secure access to your electronic health record. If you see a primary care provider, you can also send messages to your care team and make appointments. If you have questions, please call your primary care clinic.  If you do not have a primary care provider, please call [phone] and they will assist you.      Twillion is an electronic gateway that provides easy, online access to your medical records. With [employer], you can request a clinic appointment, read your test results, renew a prescription or communicate with your care team.     To access your existing account, please contact your [facility] or call [phone] for assistance.        Care EveryWhere ID     This is your Care  EveryWhere ID. This could be used by other organizations to access your [place] medical records  [id number]        Your Vitals Were     Pulse                   81            Blood Pressure from Last 3 Encounters:   11/21/17 116/81   09/22/17 126/86   08/01/17 125/85    Weight from Last 3 Encounters:   11/21/17 99.5 kg (219 lb 6.4 oz)   09/22/17 97.4 kg (214 lb 12.8 oz)   08/01/17 98.4 kg (217 lb)              Today, you had the following     No orders found for display         Where to get your medicines      These medications were sent to [facility] [id number]  [place] - [email or web address] [facility] [employer] [employer]  [facility] [address] [address]     Phone:  [phone]     escitalopram 20 MG tablet    gabapentin 100 MG capsule         Some of these will need a paper prescription and others can be bought over the counter.  Ask your nurse if you have questions.     Bring a paper prescription for each of these medications     methylphenidate 20 MG CR tablet    methylphenidate 20 MG CR tablet          Primary Care Provider Office Phone # Fax #    Jn Betts [phone]       [facility] [address] [place] [address]        Equal Access to Services     CARL[facility] LYNN AH: Hadii chio ku hadasho Soomaali, waaxda luqadaha, qaybta kaalmada adeegyada, monserrat falcon haymeghan jensen. So [facility] [phone].    ATENCIÓN: Si habla español, tiene a santana disposición servicios gratuitos de asistencia lingüística. Alejandro al [phone].    We comply with applicable federal civil rights laws and [place] laws. We do not discriminate on the basis of race, color, national origin, age, disability, sex, sexual orientation, or gender identity.            Thank you!     Thank you for choosing [facility] CLINIC  for your care. Our goal is always to provide you with excellent care. Hearing back from our patients is one way we can continue to improve our services.  Please take a few minutes to complete the written survey that you may receive in the mail after your visit with us. Thank you!             Your Updated Medication List - Protect others around you: Learn how to safely use, store and throw away your medicines at www.disposemymeds.org.          This list is accurate as of: 11/21/17 11:59 PM.  Always use your most recent med list.                   Brand Name Dispense Instructions for use Diagnosis    BENADRYL PO      Take 50 mg by mouth At Bedtime        escitalopram 20 MG tablet    LEXAPRO    30 tablet    Take 1 tablet (20 mg) by mouth daily    Major depressive disorder, recurrent episode, mild (H)       gabapentin 100 MG capsule    NEURONTIN    210 capsule    Take 2 capsules (200 mg) in the morning, 3 capsules (300 mg) at noon, and 2 capsules (200 mg) at bedtime    Generalized anxiety disorder       * methylphenidate 20 MG CR tablet    METADATE ER    60 tablet    Take 1 tablet (20 mg) by mouth 2 times daily In the morning and at noon    Attention deficit hyperactivity disorder (ADHD), unspecified ADHD type       * methylphenidate 20 MG CR tablet    METADATE ER    60 tablet    Take 1 tablet (20 mg) by mouth 2 times daily In the morning and at noon    Attention deficit hyperactivity disorder (ADHD), unspecified ADHD type       order for DME     1 Device    Equipment being ordered: full spectrum 10,000 lux light. Use 30 min every morning in fall and winter months.    Major depressive disorder, recurrent episode, mild (H)       * Notice:  This list has 2 medication(s) that are the same as other medications prescribed for you. Read the directions carefully, and ask your doctor or other care provider to review them with you.

## 2018-01-23 DIAGNOSIS — F33.0 MAJOR DEPRESSIVE DISORDER, RECURRENT EPISODE, MILD (H): ICD-10-CM

## 2018-01-23 RX ORDER — ESCITALOPRAM OXALATE 20 MG/1
20 TABLET ORAL DAILY
Qty: 30 TABLET | Refills: 0 | Status: SHIPPED | OUTPATIENT
Start: 2018-01-23 | End: 2018-02-09

## 2018-01-23 NOTE — TELEPHONE ENCOUNTER
Medication requested: Lexapro 20 mg tabs  Last refilled: 12-20-17  Qty: 30      Last seen: 11-21-17  RTC: 8 wks  Cancel: 0  No-show: 0  Next appt: none    Refill decision: 30 day mila refill sent to the pharmacy - including instructions for patient to call the clinic and schedule an appointment.      Scheduling has been notified to contact the pt for appointment.      Kathleen M Doege RN

## 2018-01-29 ENCOUNTER — TELEPHONE (OUTPATIENT)
Dept: PSYCHIATRY | Facility: CLINIC | Age: 53
End: 2018-01-29

## 2018-01-29 DIAGNOSIS — F90.9 ATTENTION DEFICIT HYPERACTIVITY DISORDER (ADHD), UNSPECIFIED ADHD TYPE: ICD-10-CM

## 2018-01-29 NOTE — TELEPHONE ENCOUNTER
Last seen: 11/21/17  RTC: 8 weeks  Cancel: none  No-show: none  Next appt: 2/9/18     Per last office note: continue Metadate ER 20 mg BID    Per [place]-, 30 d/s Metadate last filled: 1/3/18, 12/2/17, 11/1/17    Routed to provider for auth to RF per RF protocol

## 2018-01-29 NOTE — TELEPHONE ENCOUNTER
----- Message from Brandy Restrepo sent at 1/29/2018 10:52 AM CST -----  Regarding: Med Refill  Contact: [phone]  Caller:  Pt  Medication:  Ritalin 20mg  Pharmacy and location:  Mail to home  Have you contacted the pharmacy: N/A  How much of medication do you have left:  1 day  Pending appt: 2/9  Okay to leave VM:  Yes

## 2018-01-30 RX ORDER — METHYLPHENIDATE HYDROCHLORIDE EXTENDED RELEASE 20 MG/1
20 TABLET ORAL 2 TIMES DAILY
Qty: 60 TABLET | Refills: 0 | Status: SHIPPED | OUTPATIENT
Start: 2018-01-30 | End: 2018-02-09

## 2018-02-09 ENCOUNTER — OFFICE VISIT (OUTPATIENT)
Dept: PSYCHIATRY | Facility: CLINIC | Age: 53
End: 2018-02-09
Attending: NURSE PRACTITIONER
Payer: COMMERCIAL

## 2018-02-09 VITALS — SYSTOLIC BLOOD PRESSURE: 130 MMHG | WEIGHT: 216 LBS | HEART RATE: 76 BPM | DIASTOLIC BLOOD PRESSURE: 84 MMHG

## 2018-02-09 DIAGNOSIS — F33.0 MAJOR DEPRESSIVE DISORDER, RECURRENT EPISODE, MILD (H): ICD-10-CM

## 2018-02-09 DIAGNOSIS — F90.9 ATTENTION DEFICIT HYPERACTIVITY DISORDER (ADHD), UNSPECIFIED ADHD TYPE: ICD-10-CM

## 2018-02-09 DIAGNOSIS — F41.1 GENERALIZED ANXIETY DISORDER: ICD-10-CM

## 2018-02-09 PROCEDURE — G0463 HOSPITAL OUTPT CLINIC VISIT: HCPCS | Mod: ZF

## 2018-02-09 RX ORDER — ESCITALOPRAM OXALATE 20 MG/1
20 TABLET ORAL DAILY
Qty: 30 TABLET | Refills: 2 | Status: SHIPPED | OUTPATIENT
Start: 2018-02-09 | End: 2018-06-02

## 2018-02-09 RX ORDER — METHYLPHENIDATE HYDROCHLORIDE EXTENDED RELEASE 20 MG/1
20 TABLET ORAL 2 TIMES DAILY
Qty: 60 TABLET | Refills: 0 | Status: SHIPPED | OUTPATIENT
Start: 2018-02-09 | End: 2018-06-15

## 2018-02-09 RX ORDER — METHYLPHENIDATE HYDROCHLORIDE EXTENDED RELEASE 20 MG/1
20 TABLET ORAL 2 TIMES DAILY
Qty: 60 TABLET | Refills: 0 | Status: SHIPPED | OUTPATIENT
Start: 2018-02-09 | End: 2018-05-07

## 2018-02-09 RX ORDER — GABAPENTIN 100 MG/1
CAPSULE ORAL
Qty: 210 CAPSULE | Refills: 2 | Status: SHIPPED | OUTPATIENT
Start: 2018-02-09 | End: 2018-06-15

## 2018-02-09 RX ORDER — FLUTICASONE PROPIONATE 220 UG/1
1 AEROSOL, METERED RESPIRATORY (INHALATION) 2 TIMES DAILY
COMMUNITY

## 2018-02-09 ASSESSMENT — PAIN SCALES - GENERAL: PAINLEVEL: MILD PAIN (3)

## 2018-02-09 NOTE — NURSING NOTE
Chief Complaint   Patient presents with     Recheck Medication     ADHD     Reviewed allergies, medications, pharmacy and smoking status.  Administered abuse screening questions     Obtained weight, pain level, blood pressure and heart rate

## 2018-02-09 NOTE — MR AVS SNAPSHOT
After Visit Summary   2/9/2018    Jyothi Pierre    MRN: [id number]           Patient Information     Date Of Birth          1965        Visit Information        Provider Department      2/9/2018 7:30 AM Fior Bazan APRN [facility]        Today's Diagnoses     Attention deficit hyperactivity disorder (ADHD), unspecified ADHD type        Generalized anxiety disorder        Major depressive disorder, recurrent episode, mild (H)           Follow-ups after your visit        Follow-up notes from your care team     Return in about 8 weeks (around 4/6/2018), or if symptoms worsen or fail to improve, for Routine Visit.      Who to contact     Please call your clinic at [phone] to:    Ask questions about your health    Make or cancel appointments    Discuss your medicines    Learn about your test results    Speak to your doctor            Additional Information About Your Visit        MyChart Information     [employer] gives you secure access to your electronic health record. If you see a primary care provider, you can also send messages to your care team and make appointments. If you have questions, please call your primary care clinic.  If you do not have a primary care provider, please call [phone] and they will assist you.      Wedivite is an electronic gateway that provides easy, online access to your medical records. With [employer], you can request a clinic appointment, read your test results, renew a prescription or communicate with your care team.     To access your existing account, please contact your [facility] or call [phone] for assistance.        Care EveryWhere ID     This is your Care EveryWhere ID. This could be used by other organizations to access your [place] medical records  [id number]        Your Vitals Were     Pulse                   76            Blood Pressure from Last 3 Encounters:   02/09/18 130/84   11/21/17 116/81    09/22/17 126/86    Weight from Last 3 Encounters:   02/09/18 98 kg (216 lb)   11/21/17 99.5 kg (219 lb 6.4 oz)   09/22/17 97.4 kg (214 lb 12.8 oz)              Today, you had the following     No orders found for display         Where to get your medicines      These medications were sent to [facility] [id number]  [place], [place] - [address] AT [facility] [address] [place] [address]     Phone:  [phone]     escitalopram 20 MG tablet    gabapentin 100 MG capsule         Some of these will need a paper prescription and others can be bought over the counter.  Ask your nurse if you have questions.     Bring a paper prescription for each of these medications     methylphenidate 20 MG CR tablet    methylphenidate 20 MG CR tablet          Primary Care Provider Office Phone # Fax #    Jn Betts [phone]       [facility] [address] [place] [address]        Equal Access to Services     [facility]: Hadii aad ku hadasho Sogiuseppe, waaxda luqadaha, qaybta kaalmada ademigdalia, monserrat khan . So [facility] [phone].    ATENCIÓN: Si habla español, tiene a santana disposición servicios gratuitos de asistencia lingüística. Alejandro al [phone].    We comply with applicable federal civil rights laws and [place] laws. We do not discriminate on the basis of race, color, national origin, age, disability, sex, sexual orientation, or gender identity.            Thank you!     Thank you for choosing [facility]  for your care. Our goal is always to provide you with excellent care. Hearing back from our patients is one way we can continue to improve our services. Please take a few minutes to complete the written survey that you may receive in the mail after your visit with us. Thank you!             Your Updated Medication List - Protect others around you: Learn how to safely use, store and throw away your  medicines at www.disposemymeds.org.          This list is accurate as of 2/9/18 11:59 PM.  Always use your most recent med list.                   Brand Name Dispense Instructions for use Diagnosis    BENADRYL PO      Take 50 mg by mouth At Bedtime        escitalopram 20 MG tablet    LEXAPRO    30 tablet    Take 1 tablet (20 mg) by mouth daily    Major depressive disorder, recurrent episode, mild (H)       fluticasone 220 MCG/ACT Inhaler    FLOVENT HFA     Inhale 1 puff into the lungs 2 times daily        gabapentin 100 MG capsule    NEURONTIN    210 capsule    Take 2 capsules (200 mg) in the morning, 3 capsules (300 mg) at noon, and 2 capsules (200 mg) at bedtime    Generalized anxiety disorder       iloprost 10 MCG/ML Soln solution    VENTAVIS          * methylphenidate 20 MG CR tablet    METADATE ER    60 tablet    Take 1 tablet (20 mg) by mouth 2 times daily In the morning and at noon    Attention deficit hyperactivity disorder (ADHD), unspecified ADHD type       * methylphenidate 20 MG CR tablet    METADATE ER    60 tablet    Take 1 tablet (20 mg) by mouth 2 times daily In the morning and at noon    Attention deficit hyperactivity disorder (ADHD), unspecified ADHD type       order for DME     1 Device    Equipment being ordered: full spectrum 10,000 lux light. Use 30 min every morning in fall and winter months.    Major depressive disorder, recurrent episode, mild (H)       * Notice:  This list has 2 medication(s) that are the same as other medications prescribed for you. Read the directions carefully, and ask your doctor or other care provider to review them with you.

## 2018-02-09 NOTE — PROGRESS NOTES
"  Outpatient Psychiatry Progress Note     Provider: JOSELUIS Roldan CNP  Date: 2018  Service:  Medication management.   Patient Identification: Jyothi Pierre  : 1965   MRN: [id number]    Jyothi Pierre is a 52 year old female who presents for ongoing psychiatric care.  Jyothi was last seen in clinic on 17. At that time, she chose to continue Metadate ER 20mg BID, gabapentin 200mg QAM, QHS and 300mg QNoon with Lexapro 20mg daily.    2018  Today Jyothi reports she is doing well.  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  - she enjoys watching her adult daughter, Sleena parent raising her 6month old grandson.  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  - enjoys her 115# Renee Vasqeus and 100# lab, Jan    Compliance: daily  Side effects: denies    Psychiatric Review of Systems:  Depression: \"its better\"  SAD: using light box inconsistently, brainstormed ways to improve compliance  Anxiety: triggered by certain smells and in the shower from past trauma    Lethality: denies    Review of Medical Systems:  Appetite: OK, 3# weight loss  Sleep: sleeping well  Self Care: encouraged, enjoys time with family  Housework and hygiene: managing as is normal for her  Energy: intact  Concentration: intact    Current Substance Use:    Social History     Social History     Marital status:      Spouse name: N/A     Number of children: N/A     Years of education: N/A     Social History Main Topics     Smoking status: Former Smoker     Types: Cigarettes     Quit date: 2013     Smokeless tobacco: Never Used      Comment: quit last week (13)     Alcohol use No     Drug use: No     Sexual activity: Not Asked " "    Other Topics Concern     None     Social History Narrative     Quit smoking in 2012. Stopped drinking in 2004. Limiting caffeine.     Past Medical History:   Past Medical History:   Diagnosis Date     Chronic kidney disease      Depressive disorder, not elsewhere classified 11/8/2012     Renal colic      Medical health update: none today    Allergies:   Allergies   Allergen Reactions     Nubain [Nalbuphine Hcl]      Percocet [Oxycodone-Acetaminophen] Swelling     Toradol Other (See Comments)     Headache     Adderall Rash     Compazine Anxiety        Current Medications     Current Outpatient Prescriptions Ordered in [facility]   Medication Sig Dispense Refill     iloprost (VENTAVIS) 10 MCG/ML SOLN solution        fluticasone (FLOVENT HFA) 220 MCG/ACT Inhaler Inhale 1 puff into the lungs 2 times daily       methylphenidate (METADATE ER) 20 MG CR tablet Take 1 tablet (20 mg) by mouth 2 times daily In the morning and at noon 60 tablet 0     escitalopram (LEXAPRO) 20 MG tablet Take 1 tablet (20 mg) by mouth daily 30 tablet 0     methylphenidate (METADATE ER) 20 MG CR tablet Take 1 tablet (20 mg) by mouth 2 times daily In the morning and at noon 60 tablet 0     gabapentin (NEURONTIN) 100 MG capsule Take 2 capsules (200 mg) in the morning, 3 capsules (300 mg) at noon, and 2 capsules (200 mg) at bedtime 210 capsule 1     order for DME Equipment being ordered: full spectrum 10,000 lux light. Use 30 min every morning in fall and winter months. 1 Device 0     DiphenhydrAMINE HCl (BENADRYL PO) Take 50 mg by mouth At Bedtime       No current Epic-ordered facility-administered medications on file.       Mental Status Exam     Appearance:  Casually dressed and Well groomed, appears stated age  Behavior/relationship to examiner/demeanor:  Cooperative, Engaged and Pleasant  Orientation: Oriented to person, place, time and situation  Psychomotor: WNL  Speech Rate:  Normal  Speech Spontaneity:  Normal  Mood:  \"good\"  Affect:  " Appropriate/mood-congruent and Bright  Thought Process (Associations):  Logical, Linear and Goal directed  Thought Content:  no evidence of suicidal or homicidal ideation, no overt psychosis, denies suicidal ideation, intent or thoughts, patient denies auditory and visual hallucinations, patient does not appear to be responding to internal stimuli and denies suicidal intent or plan  Abnormal Perception:  None  Attention/Concentration:  Normal  Language:  Intact  Insight:  Good  Judgment:  Good      Results     Vital signs: /84  Pulse 76  Wt 98 kg (216 lb)    Laboratory Data:   Lab Results   Component Value Date    WBC 5.0 12/11/2015    HGB 14.5 12/11/2015    HCT 43.1 12/11/2015     12/11/2015    ALT 30 12/11/2015    AST 15 12/11/2015     12/11/2015    BUN 11 12/11/2015    CO2 28 12/11/2015    TSH 1.99 12/11/2015    INR 1.11 04/06/2010     Assessment & Plan     Jyothi is seen today for follow up.  - monitor need for light box for SAD  - encouraged exercise and dog training as she is able    Diagnosis  Axis 1: PTSD, MDD, ADHD, SAD  Axis 2: none     Plan:  Medication: continue Metadate ER 20mg BID, Gabapentin 200mg qAM, qHS with 300 mg QNoon, Lexapro 20mg daily  OTC Recommendations: Benadryl PRN for sleep  Other: encouraged physical activity as she tolerates  Lab Orders: none  Referrals: none, sees Dr. Enriquez as needed  Release of Information:   Future Treatment Considerations: none  Return for Follow Up: 8 weeks, sooner as needed    She voices understanding of the treatment plan including discussion of options, risks/ benefits. She has clinic contact information and will seek emergency services if urgent or life threatening symptoms present. Jyothi understands risks associated with drug and alcohol use.     Visit was spent counseling the patient and/or coordinating care regarding review of social and occupational functioning.  In addition patient was counseled on health and wellness practices to  augment medication treatment of symptoms. See note for details.    PHQ-9 score:    PHQ-9 SCORE 9/22/2017   Total Score -   Total Score 2     GAD7: No flowsheet data found.  : 11/2017  JOSELUIS Roldan CNP 2/9/2018

## 2018-05-07 ENCOUNTER — TELEPHONE (OUTPATIENT)
Dept: PSYCHIATRY | Facility: CLINIC | Age: 53
End: 2018-05-07

## 2018-05-07 DIAGNOSIS — F90.9 ATTENTION DEFICIT HYPERACTIVITY DISORDER (ADHD), UNSPECIFIED ADHD TYPE: ICD-10-CM

## 2018-05-07 RX ORDER — METHYLPHENIDATE HYDROCHLORIDE EXTENDED RELEASE 20 MG/1
20 TABLET ORAL 2 TIMES DAILY
Qty: 60 TABLET | Refills: 0 | Status: SHIPPED | OUTPATIENT
Start: 2018-05-07 | End: 2018-06-15

## 2018-05-07 NOTE — TELEPHONE ENCOUNTER
----- Message from Tamera Thurston sent at 5/4/2018  4:45 PM CDT -----  Regarding: Refill Request  Contact: [phone]  Caller:  Jyothi  Medication: Ritalin 20 mg taken twice a day  Pharmacy and location:  [facility]   Have you contacted the pharmacy: Yes  How much of medication do you have left: for the weekend  Pending appt: Yes  Okay to leave VM: Yes    Thanks!

## 2018-05-07 NOTE — TELEPHONE ENCOUNTER
Fior Bazan, Sumaya Kumar CNP, RN        Caller: Unspecified (Today,  9:21 AM)                     Yes for 30d/s       Script printed and routed to provider for signature

## 2018-05-07 NOTE — TELEPHONE ENCOUNTER
Last seen: 2/9/18  RTC: 8 weeks  Cancel: none  No-show: none  Next appt: 5/18/18     Per [place]-, Metadate ER 20 mg tabs #60 last filled: 4/7/18, 3/6/18, 2/4/18    Routed to Fior Bazan for auth to refill due either a 30 d/s or just until next appt

## 2018-05-08 NOTE — TELEPHONE ENCOUNTER
-Received signed script from provider  -Faxed to [facility] at [phone]  -Left VM for pt informing her that refill has been sent to pharmacy

## 2018-05-31 DIAGNOSIS — F33.0 MAJOR DEPRESSIVE DISORDER, RECURRENT EPISODE, MILD (H): ICD-10-CM

## 2018-05-31 RX ORDER — ESCITALOPRAM OXALATE 20 MG/1
20 TABLET ORAL DAILY
Qty: 30 TABLET | Refills: 0 | Status: CANCELLED | OUTPATIENT
Start: 2018-05-31

## 2018-06-01 NOTE — TELEPHONE ENCOUNTER
Medication requested: Lexapro 20 mg tabs  Last refilled: 4-27-18  Qty: 30      Last seen: 2-9-18  RTC: 8 wks  Cancel: 0  No-show: 1  Next appt: none    Refill decision:   Refill pended and routed to the provider for review/determination due to NOS x1 and no scheduled appointment, Pt outside of RTC timeframe.    Scheduling has been notified to contact the pt for appointment.      Kathleen M Doege RN

## 2018-06-02 ENCOUNTER — TELEPHONE (OUTPATIENT)
Dept: PSYCHIATRY | Facility: CLINIC | Age: 53
End: 2018-06-02

## 2018-06-02 DIAGNOSIS — F33.0 MAJOR DEPRESSIVE DISORDER, RECURRENT EPISODE, MILD (H): ICD-10-CM

## 2018-06-02 RX ORDER — ESCITALOPRAM OXALATE 20 MG/1
20 TABLET ORAL DAILY
Qty: 7 TABLET | Refills: 0 | Status: SHIPPED | OUTPATIENT
Start: 2018-06-02 | End: 2018-06-15

## 2018-06-03 NOTE — TELEPHONE ENCOUNTER
"Brief Psychiatry Telephone Note    Phone call time: 7.58pm  Clinic Provider: JOSELUIS Maldonado     S: Patient is a 53 year old female with MDD, ADHD, and PTSD who was last seen in clinic on 2/9/18. Currently, requesting refill for Lexapro. She didn't realize until recently that she was out of refill. Her prescription ran out on 5/27/18. Her pharmacy, [facility], faxed a request for medication refill to the [facility] on the weekend of 5/28/18. The pharmacy gave her a an emergency refill for 3 days. She called the pharmacy and they said they haven't heard back from the [facility]. Today, they only gave her 2 days supply which would be an issue for her since she goes back to work on Monday and needs a refill. She missed 2 days of Lexapro (on 5/31/18 and 6/1/18). When she is off it, she gets withdrawal symptoms expressing it as \"real sense of ill will\" \"higher anxiety\". She denies SI.     She has not been able to call the clinic herself because she has no time during work to be able to make a phone call to the clinic. Patient thought she had an appointment set up with Fior Bazan but found out she doesn't. When asked about the appointment on 5/18/18 that was missed, she replied \"oh, that is my fault\". She is agreeable with having a week supply and she would call the clinic on Monday to schedule a follow up appointment.     A/P:  No acute safety concerns.   - Lexapro 1 week supply ordered. Patient to follow up with primary provider.   - continue PTA medications, per provider  - patient to call in on Monday to schedule a follow up appointment with Fior Bazan    Patient is not currently suicidal or homicidal. she is aware to call 911 or go to the nearest ER if safety concern or urgent symptoms arise.    Robbie Rodríguez MD  PGY 2 [facility] resident    "

## 2018-06-05 RX ORDER — ESCITALOPRAM OXALATE 20 MG/1
20 TABLET ORAL DAILY
Qty: 30 TABLET | Refills: 0 | Status: SHIPPED | OUTPATIENT
Start: 2018-06-05 | End: 2018-06-15

## 2018-06-05 NOTE — TELEPHONE ENCOUNTER
-Received incoming phone call from pt requesting that medication be filled today due to being completely out. According to the pt, she was given a 3 day emergency supply on 5/28, and then a 2 day emergency supply over the weekend. She apologized for missing most recent appt on 5/18, as she completely forgot that it was scheduled.     -Writer spoke to provider via phone and provider okay'd refill of Lexapro 20 mg tabs     -30 d/s sent to pt's pharmacy     -Called pt and informed her that a refill has been sent to [facility]

## 2018-06-07 ENCOUNTER — TELEPHONE (OUTPATIENT)
Dept: PSYCHIATRY | Facility: CLINIC | Age: 53
End: 2018-06-07

## 2018-06-07 DIAGNOSIS — F90.9 ATTENTION DEFICIT HYPERACTIVITY DISORDER (ADHD), UNSPECIFIED ADHD TYPE: Primary | ICD-10-CM

## 2018-06-07 RX ORDER — METHYLPHENIDATE HYDROCHLORIDE EXTENDED RELEASE 20 MG/1
20 TABLET ORAL
Qty: 60 TABLET | Refills: 0 | Status: SHIPPED | OUTPATIENT
Start: 2018-06-07 | End: 2018-06-15

## 2018-06-07 NOTE — TELEPHONE ENCOUNTER
-Script for 30 d/s printed and placed in provider's folder for signature  -Message routed to provider

## 2018-06-07 NOTE — TELEPHONE ENCOUNTER
Med Refill - Beni  Received: Today       Brandy Angel Laura RN       Phone Number: [phone]                     Caller:  pt   Relationship to pt: self   Medication:   Ritalin   How many days left of med do you have left (if >3 day supply, redirect to pharmacy): one   Pharmacy and location: [place] [facility]   Pending appt date:  6/15   Okay to leave detailed VM:  yes   Or call  line - [phone] verbal ok 6/7/18

## 2018-06-07 NOTE — TELEPHONE ENCOUNTER
-Received signed script from provider  -[facility]   [facility] [employer] [id number] [facility]    [place] [id number]

## 2018-06-07 NOTE — TELEPHONE ENCOUNTER
Message  Received: Today       Fior Bazan APRN CNP Pratt, Laura, RN       Caller: Unspecified (Today,  9:26 AM)                     30 d/s please

## 2018-06-07 NOTE — TELEPHONE ENCOUNTER
Last seen: 2/9/18  RTC: 8 weeks  Cancel: none  No-show: 5/18/18  Next appt: 6/15/18     Medication requested: methylphenidate (METADATE ER) 20 MG CR tablet  Directions: Take 1 tablet (20 mg) by mouth 2 times daily In the morning and at noon - Oral  Qty: 60  Last refilled: 5/8/18, 4/7/18, and 3/6/18 per [place]      Routed to provider due to one no show

## 2018-06-15 ENCOUNTER — OFFICE VISIT (OUTPATIENT)
Dept: PSYCHIATRY | Facility: CLINIC | Age: 53
End: 2018-06-15
Attending: NURSE PRACTITIONER
Payer: COMMERCIAL

## 2018-06-15 VITALS — WEIGHT: 212 LBS | SYSTOLIC BLOOD PRESSURE: 118 MMHG | DIASTOLIC BLOOD PRESSURE: 80 MMHG | HEART RATE: 62 BPM

## 2018-06-15 DIAGNOSIS — F33.0 MAJOR DEPRESSIVE DISORDER, RECURRENT EPISODE, MILD (H): ICD-10-CM

## 2018-06-15 DIAGNOSIS — F41.1 GENERALIZED ANXIETY DISORDER: ICD-10-CM

## 2018-06-15 DIAGNOSIS — F90.9 ATTENTION DEFICIT HYPERACTIVITY DISORDER (ADHD), UNSPECIFIED ADHD TYPE: ICD-10-CM

## 2018-06-15 PROCEDURE — G0463 HOSPITAL OUTPT CLINIC VISIT: HCPCS | Mod: ZF

## 2018-06-15 RX ORDER — ESCITALOPRAM OXALATE 20 MG/1
20 TABLET ORAL DAILY
Qty: 30 TABLET | Refills: 5 | Status: SHIPPED | OUTPATIENT
Start: 2018-06-15 | End: 2018-08-06

## 2018-06-15 RX ORDER — METHYLPHENIDATE HYDROCHLORIDE EXTENDED RELEASE 20 MG/1
20 TABLET ORAL
Qty: 60 TABLET | Refills: 0 | Status: SHIPPED | OUTPATIENT
Start: 2018-06-15 | End: 2018-10-15

## 2018-06-15 RX ORDER — GABAPENTIN 100 MG/1
CAPSULE ORAL
Qty: 210 CAPSULE | Refills: 5 | Status: SHIPPED | OUTPATIENT
Start: 2018-06-15 | End: 2018-10-04

## 2018-06-15 RX ORDER — METHYLPHENIDATE HYDROCHLORIDE EXTENDED RELEASE 20 MG/1
20 TABLET ORAL 2 TIMES DAILY
Qty: 60 TABLET | Refills: 0 | Status: SHIPPED | OUTPATIENT
Start: 2018-06-15 | End: 2018-10-15

## 2018-06-15 RX ORDER — ESCITALOPRAM OXALATE 20 MG/1
20 TABLET ORAL DAILY
Qty: 30 TABLET | Refills: 0 | Status: SHIPPED | OUTPATIENT
Start: 2018-06-15 | End: 2018-10-04

## 2018-06-15 RX ORDER — METHYLPHENIDATE HYDROCHLORIDE EXTENDED RELEASE 20 MG/1
20 TABLET ORAL 2 TIMES DAILY
Qty: 60 TABLET | Refills: 0 | Status: SHIPPED | OUTPATIENT
Start: 2018-06-15 | End: 2018-10-04

## 2018-06-15 ASSESSMENT — PAIN SCALES - GENERAL: PAINLEVEL: MODERATE PAIN (5)

## 2018-06-15 NOTE — NURSING NOTE
Chief Complaint   Patient presents with     Recheck Medication     Attention deficit hyperactivity disorder (ADHD), unspecified ADHD type
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
## 2018-06-15 NOTE — MR AVS SNAPSHOT
After Visit Summary   6/15/2018    Jyothi Pierre    MRN: [id number]           Patient Information     Date Of Birth          1965        Visit Information        Provider Department      6/15/2018 9:00 AM Fior Bazan APRN [facility]        Today's Diagnoses     Major depressive disorder, recurrent episode, mild (H)        Attention deficit hyperactivity disorder (ADHD), unspecified ADHD type        Generalized anxiety disorder           Follow-ups after your visit        Follow-up notes from your care team     Return in about 6 months (around 12/15/2018), or if symptoms worsen or fail to improve, for BP Recheck, Routine Visit.      Who to contact     Please call your clinic at [phone] to:    Ask questions about your health    Make or cancel appointments    Discuss your medicines    Learn about your test results    Speak to your doctor            Additional Information About Your Visit        MyChart Information     2Checkout gives you secure access to your electronic health record. If you see a primary care provider, you can also send messages to your care team and make appointments. If you have questions, please call your primary care clinic.  If you do not have a primary care provider, please call [phone] and they will assist you.      2Checkout is an electronic gateway that provides easy, online access to your medical records. With 2Checkout, you can request a clinic appointment, read your test results, renew a prescription or communicate with your care team.     To access your existing account, please contact your [facility] or call [phone] for assistance.        Care EveryWhere ID     This is your Care EveryWhere ID. This could be used by other organizations to access your [facility] medical records  [id number]        Your Vitals Were     Pulse                   62            Blood Pressure from Last 3 Encounters:   06/15/18 118/80    02/09/18 130/84   11/21/17 116/81    Weight from Last 3 Encounters:   06/15/18 96.2 kg (212 lb)   02/09/18 98 kg (216 lb)   11/21/17 99.5 kg (219 lb 6.4 oz)              Today, you had the following     No orders found for display         Today's Medication Changes          These changes are accurate as of 6/15/18 11:59 PM.  If you have any questions, ask your nurse or doctor.               These medicines have changed or have updated prescriptions.        Dose/Directions    * escitalopram 20 MG tablet   Commonly known as:  LEXAPRO   This may have changed:  Another medication with the same name was added. Make sure you understand how and when to take each.   Used for:  Major depressive disorder, recurrent episode, mild (H)   Changed by:  Fior Bazan APRN CNP        Dose:  20 mg   Take 1 tablet (20 mg) by mouth daily   Quantity:  30 tablet   Refills:  5       * escitalopram 20 MG tablet   Commonly known as:  LEXAPRO   This may have changed:  You were already taking a medication with the same name, and this prescription was added. Make sure you understand how and when to take each.   Used for:  Major depressive disorder, recurrent episode, mild (H)   Changed by:  Fior Bazan APRN CNP        Dose:  20 mg   Take 1 tablet (20 mg) by mouth daily   Quantity:  30 tablet   Refills:  0       * Notice:  This list has 2 medication(s) that are the same as other medications prescribed for you. Read the directions carefully, and ask your doctor or other care provider to review them with you.         Where to get your medicines      These medications were sent to [id number]  [address] [facility]  [address] [place] [address]     Phone:  [phone]     escitalopram 20 MG tablet    escitalopram 20 MG tablet    gabapentin 100 MG capsule         Some of these will need a paper prescription and others can be bought over the counter.  Ask your nurse if  you have questions.     Bring a paper prescription for each of these medications     methylphenidate 20 MG CR tablet    methylphenidate 20 MG CR tablet    methylphenidate 20 MG CR tablet                Primary Care Provider Office Phone # Fax #    Jn Betts [phone]       [facility] [address] [place] [address]        Equal Access to Services     [facility]: Hadii aad ku hadasho Soomaali, waaxda luqadaha, qaybta kaalmada adeegyada, waxay idiin hayaan adeeg pastorarosysh lakira . So [facility] [phone].    ATENCIÓN: Si habla espevan, tiene a santana disposición servicios gratuitos de asistencia lingüística. [facility] [phone].    We comply with applicable federal civil rights laws and [place] laws. We do not discriminate on the basis of race, color, national origin, age, disability, sex, sexual orientation, or gender identity.            Thank you!     Thank you for choosing [facility]IATRY CLINIC  for your care. Our goal is always to provide you with excellent care. Hearing back from our patients is one way we can continue to improve our services. Please take a few minutes to complete the written survey that you may receive in the mail after your visit with us. Thank you!             Your Updated Medication List - Protect others around you: Learn how to safely use, store and throw away your medicines at www.disposemymeds.org.          This list is accurate as of 6/15/18 11:59 PM.  Always use your most recent med list.                   Brand Name Dispense Instructions for use Diagnosis    BENADRYL PO      Take 50 mg by mouth At Bedtime        * escitalopram 20 MG tablet    LEXAPRO    30 tablet    Take 1 tablet (20 mg) by mouth daily    Major depressive disorder, recurrent episode, mild (H)       * escitalopram 20 MG tablet    LEXAPRO    30 tablet    Take 1 tablet (20 mg) by mouth daily    Major depressive disorder, recurrent episode, mild (H)       fluticasone 220  MCG/ACT Inhaler    FLOVENT HFA     Inhale 1 puff into the lungs 2 times daily        gabapentin 100 MG capsule    NEURONTIN    210 capsule    Take 2 capsules (200 mg) in the morning, 3 capsules (300 mg) at noon, and 2 capsules (200 mg) at bedtime    Generalized anxiety disorder       iloprost 10 MCG/ML Soln solution    VENTAVIS          * methylphenidate 20 MG CR tablet    METADATE ER    60 tablet    Take 1 tablet (20 mg) by mouth 2 times daily    Attention deficit hyperactivity disorder (ADHD), unspecified ADHD type       * methylphenidate 20 MG CR tablet    METADATE ER    60 tablet    Take 1 tablet (20 mg) by mouth 2 times daily In the morning and at noon    Attention deficit hyperactivity disorder (ADHD), unspecified ADHD type       * methylphenidate 20 MG CR tablet    METADATE ER    60 tablet    Take 1 tablet (20 mg) by mouth 2 times daily In the morning and at noon    Attention deficit hyperactivity disorder (ADHD), unspecified ADHD type       order for DME     1 Device    Equipment being ordered: full spectrum 10,000 lux light. Use 30 min every morning in fall and winter months.    Major depressive disorder, recurrent episode, mild (H)       * Notice:  This list has 5 medication(s) that are the same as other medications prescribed for you. Read the directions carefully, and ask your doctor or other care provider to review them with you.

## 2018-08-06 DIAGNOSIS — F33.0 MAJOR DEPRESSIVE DISORDER, RECURRENT EPISODE, MILD (H): ICD-10-CM

## 2018-08-06 RX ORDER — ESCITALOPRAM OXALATE 20 MG/1
20 TABLET ORAL DAILY
Qty: 30 TABLET | Refills: 0 | Status: SHIPPED | OUTPATIENT
Start: 2018-08-06 | End: 2018-10-04

## 2018-08-06 NOTE — TELEPHONE ENCOUNTER
Medication requested: lexapro 20mg tab  Last refilled: 7/5/18  Qty: 30      Last seen: 6/15/18  RTC: 6 months  Cancel: 0  No-show: 0  Next appt: not scheduled    Refill decision:   30 day mila refill sent to the pharmacy - including instructions for patient to call the clinic and schedule an appointment.

## 2018-10-04 ENCOUNTER — OFFICE VISIT (OUTPATIENT)
Dept: PSYCHIATRY | Facility: CLINIC | Age: 53
End: 2018-10-04
Attending: NURSE PRACTITIONER
Payer: COMMERCIAL

## 2018-10-04 VITALS — HEART RATE: 81 BPM | WEIGHT: 216.4 LBS | DIASTOLIC BLOOD PRESSURE: 77 MMHG | SYSTOLIC BLOOD PRESSURE: 110 MMHG

## 2018-10-04 DIAGNOSIS — F33.0 MAJOR DEPRESSIVE DISORDER, RECURRENT EPISODE, MILD (H): ICD-10-CM

## 2018-10-04 DIAGNOSIS — F90.9 ATTENTION DEFICIT HYPERACTIVITY DISORDER (ADHD), UNSPECIFIED ADHD TYPE: ICD-10-CM

## 2018-10-04 DIAGNOSIS — F41.1 GENERALIZED ANXIETY DISORDER: ICD-10-CM

## 2018-10-04 PROCEDURE — G0463 HOSPITAL OUTPT CLINIC VISIT: HCPCS | Mod: ZF

## 2018-10-04 RX ORDER — ESCITALOPRAM OXALATE 20 MG/1
20 TABLET ORAL DAILY
Qty: 30 TABLET | Refills: 5 | Status: SHIPPED | OUTPATIENT
Start: 2018-10-04 | End: 2019-08-07

## 2018-10-04 RX ORDER — METHYLPHENIDATE HYDROCHLORIDE EXTENDED RELEASE 20 MG/1
TABLET ORAL
Qty: 90 TABLET | Refills: 0 | Status: SHIPPED | OUTPATIENT
Start: 2018-10-04 | End: 2018-11-05

## 2018-10-04 RX ORDER — GABAPENTIN 100 MG/1
CAPSULE ORAL
Qty: 210 CAPSULE | Refills: 5 | Status: SHIPPED | OUTPATIENT
Start: 2018-10-04 | End: 2019-07-29

## 2018-10-04 ASSESSMENT — PAIN SCALES - GENERAL: PAINLEVEL: MILD PAIN (2)

## 2018-10-04 NOTE — MR AVS SNAPSHOT
After Visit Summary   10/4/2018    Jyothi Pierre    MRN: [id number]           Patient Information     Date Of Birth          1965        Visit Information        Provider Department      10/4/2018 2:30 PM Fior Bazan APRN [facility]        Today's Diagnoses     Major depressive disorder, recurrent episode, mild (H)        Generalized anxiety disorder        Attention deficit hyperactivity disorder (ADHD), unspecified ADHD type           Follow-ups after your visit        Follow-up notes from your care team     Return in about 12 weeks (around 12/27/2018), or if symptoms worsen or fail to improve, for BP Recheck, Routine Visit.      Who to contact     Please call your clinic at [phone] to:    Ask questions about your health    Make or cancel appointments    Discuss your medicines    Learn about your test results    Speak to your doctor            Additional Information About Your Visit        MyChart Information     Gateshop gives you secure access to your electronic health record. If you see a primary care provider, you can also send messages to your care team and make appointments. If you have questions, please call your primary care clinic.  If you do not have a primary care provider, please call [phone] and they will assist you.      Gateshop is an electronic gateway that provides easy, online access to your medical records. With Gateshop, you can request a clinic appointment, read your test results, renew a prescription or communicate with your care team.     To access your existing account, please contact your [facility] or call [phone] for assistance.        Care EveryWhere ID     This is your Care EveryWhere ID. This could be used by other organizations to access your [facility] medical records  [id number]        Your Vitals Were     Pulse                   81            Blood Pressure from Last 3 Encounters:   10/04/18 110/77    06/15/18 118/80   02/09/18 130/84    Weight from Last 3 Encounters:   10/04/18 98.2 kg (216 lb 6.4 oz)   06/15/18 96.2 kg (212 lb)   02/09/18 98 kg (216 lb)              Today, you had the following     No orders found for display         Today's Medication Changes          These changes are accurate as of 10/4/18 11:59 PM.  If you have any questions, ask your nurse or doctor.               These medicines have changed or have updated prescriptions.        Dose/Directions    escitalopram 20 MG tablet   Commonly known as:  LEXAPRO   This may have changed:  Another medication with the same name was removed. Continue taking this medication, and follow the directions you see here.   Used for:  Major depressive disorder, recurrent episode, mild (H)   Changed by:  Fior Bazan APRN CNP        Dose:  20 mg   Take 1 tablet (20 mg) by mouth daily   Quantity:  30 tablet   Refills:  5       methylphenidate 20 MG CR tablet   Commonly known as:  METADATE ER   This may have changed:    - how much to take  - how to take this  - when to take this  - additional instructions  - Another medication with the same name was removed. Continue taking this medication, and follow the directions you see here.   Used for:  Attention deficit hyperactivity disorder (ADHD), unspecified ADHD type   Changed by:  Fior Bazan APRN CNP        Take one tab three times daily as needed for attention   Quantity:  90 tablet   Refills:  0            Where to get your medicines      These medications were sent to [facility] [id number]  [address] - [address] [facility] [address] [place] [place] [address]     Phone:  [phone]     escitalopram 20 MG tablet    gabapentin 100 MG capsule         Some of these will need a paper prescription and others can be bought over the counter.  Ask your nurse if you have questions.     Bring a paper prescription for each of these medications     methylphenidate 20 MG CR  tablet                Primary Care Provider Office Phone # Fax #    Jn Betts [phone]       [facility] [address] [place] [address]        Equal Access to Services     [facility] : Hadii aad ku hadsheilao Soomaali, waaxda luqadaha, qaybta kaalmada adeegyada, waxleslie idiin haywillisn adehumberto colunga laLorenameghan . So [facility] [phone].    ATENCIÓN: Si habla español, tiene a santana disposición servicios gratuitos de asistencia lingüística. Llame al [phone].    We comply with applicable federal civil rights laws and [place] laws. We do not discriminate on the basis of race, color, national origin, age, disability, sex, sexual orientation, or gender identity.            Thank you!     Thank you for choosing [facility]IN[facility]  for your care. Our goal is always to provide you with excellent care. Hearing back from our patients is one way we can continue to improve our services. Please take a few minutes to complete the written survey that you may receive in the mail after your visit with us. Thank you!             Your Updated Medication List - Protect others around you: Learn how to safely use, store and throw away your medicines at www.disposemymeds.org.          This list is accurate as of 10/4/18 11:59 PM.  Always use your most recent med list.                   Brand Name Dispense Instructions for use Diagnosis    BENADRYL PO      Take 50 mg by mouth At Bedtime        escitalopram 20 MG tablet    LEXAPRO    30 tablet    Take 1 tablet (20 mg) by mouth daily    Major depressive disorder, recurrent episode, mild (H)       fluticasone 220 MCG/ACT Inhaler    FLOVENT HFA     Inhale 1 puff into the lungs 2 times daily        gabapentin 100 MG capsule    NEURONTIN    210 capsule    Take 2 capsules (200 mg) in the morning, 3 capsules (300 mg) at noon, and 2 capsules (200 mg) at bedtime    Generalized anxiety disorder       iloprost 10 MCG/ML Soln solution    VENTAVIS           methylphenidate 20 MG CR tablet    METADATE ER    90 tablet    Take one tab three times daily as needed for attention    Attention deficit hyperactivity disorder (ADHD), unspecified ADHD type       order for DME     1 Device    Equipment being ordered: full spectrum 10,000 lux light. Use 30 min every morning in fall and winter months.    Major depressive disorder, recurrent episode, mild (H)
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
## 2018-11-05 ENCOUNTER — MYC MEDICAL ADVICE (OUTPATIENT)
Dept: PSYCHIATRY | Facility: CLINIC | Age: 53
End: 2018-11-05

## 2018-11-05 DIAGNOSIS — F90.9 ATTENTION DEFICIT HYPERACTIVITY DISORDER (ADHD), UNSPECIFIED ADHD TYPE: ICD-10-CM

## 2018-11-05 RX ORDER — METHYLPHENIDATE HYDROCHLORIDE EXTENDED RELEASE 20 MG/1
TABLET ORAL
Qty: 90 TABLET | Refills: 0 | Status: SHIPPED | OUTPATIENT
Start: 2018-11-05 | End: 2018-12-10

## 2018-11-05 NOTE — TELEPHONE ENCOUNTER
Refill Request (methylphenidate (METADATE ER) 20 MG CR tablet)            Fior Bazan, JOSELUIS CNP   You 31 minutes ago (12:00 PM)                 Thanks, I'll put it back in your folder. She last filled Ritalin 20mg #60 on 10/8/2018 per . (Routing comment)                      - Writer received signed script from provider   - Will await for patients response on where she would like the script to be sent   - Unable to get a hold of the patient  - Script mailed out to [facility] in [place]   - No further action needed by the writer

## 2018-11-05 NOTE — TELEPHONE ENCOUNTER
Last seen: 10/4/18  RTC: 12 weeks   Cancel: none   No-show: none   Next appt: none     Incoming refill from patient via phone     Medication requested: methylphenidate (METADATE ER) 20 MG CR tablet  Directions: Take one tab three times daily as needed for attention  Qty: 90  Last refilled: 10/8/18, 9/9/18, 8/10/18     Medication refill approved per refill protocol  30 day supply refilled   Script printed and placed in providers folder to be signed   Will route to provider

## 2018-11-08 ENCOUNTER — MYC MEDICAL ADVICE (OUTPATIENT)
Dept: PSYCHIATRY | Facility: CLINIC | Age: 53
End: 2018-11-08

## 2018-11-08 NOTE — TELEPHONE ENCOUNTER
- Script was mailed out to preferred pharmacy 11/5/18  - No further action needed by the writer

## 2018-12-07 ENCOUNTER — MYC MEDICAL ADVICE (OUTPATIENT)
Dept: PSYCHIATRY | Facility: CLINIC | Age: 53
End: 2018-12-07

## 2018-12-07 DIAGNOSIS — F90.9 ATTENTION DEFICIT HYPERACTIVITY DISORDER (ADHD), UNSPECIFIED ADHD TYPE: ICD-10-CM

## 2018-12-07 NOTE — TELEPHONE ENCOUNTER
Last seen: 10/4/18  RTC: 3-6 months   Cancel: none   No-show: none   Next appt: none     Incoming refill from patient via [employer]     Medication requested: methylphenidate (METADATE ER) 20 MG CR tablet  Directions: Take one tab three times daily as needed for attention  Qty: 90  Last refilled: 11/8, 10/8, 9/9     Will route to provider for approval for a refill

## 2018-12-10 NOTE — TELEPHONE ENCOUNTER
- Writer received VORB from provider:   1. Refill Metadate ER 20 mg TID as needed for attention # 90 with 0 additional refills    2. Remind patient to schedule an appointment     - Will route to Dr. Hwang if available to sign

## 2018-12-12 RX ORDER — METHYLPHENIDATE HYDROCHLORIDE EXTENDED RELEASE 20 MG/1
TABLET ORAL
Qty: 90 TABLET | Refills: 0 | Status: SHIPPED | OUTPATIENT
Start: 2018-12-12 | End: 2019-01-07

## 2018-12-12 NOTE — TELEPHONE ENCOUNTER
- Writer received signed script from provider for Metadate ER 20 mg TID   - Will notify patient via [employer]

## 2018-12-12 NOTE — TELEPHONE ENCOUNTER
- Writer received incoming call from patient   Patient does not have a care and is requesting the script to be mailed out to [facility] in [place]. Writer agreed with the plan and will mail out the script now. No further action needed by this writer.

## 2018-12-17 ENCOUNTER — TELEPHONE (OUTPATIENT)
Dept: PSYCHIATRY | Facility: CLINIC | Age: 53
End: 2018-12-17

## 2019-01-07 ENCOUNTER — MYC REFILL (OUTPATIENT)
Dept: PSYCHIATRY | Facility: CLINIC | Age: 54
End: 2019-01-07

## 2019-01-07 DIAGNOSIS — F90.9 ATTENTION DEFICIT HYPERACTIVITY DISORDER (ADHD), UNSPECIFIED ADHD TYPE: ICD-10-CM

## 2019-01-08 RX ORDER — METHYLPHENIDATE HYDROCHLORIDE EXTENDED RELEASE 20 MG/1
TABLET ORAL
Qty: 90 TABLET | Refills: 0 | Status: SHIPPED | OUTPATIENT
Start: 2019-01-12 | End: 2019-02-08

## 2019-01-08 NOTE — TELEPHONE ENCOUNTER
Fior Bazan, JOSELUIS CNP   You 11 minutes ago (2:59 PM)      Yes, please note in the pharmacy box the date she's eligible to fill (per / last fill date) and I'll sign so it can get mailed. (Routing comment)      - Meds refilled per providers approval with a start date of 1/12/19  - Med tab changed to reflect this   - Script printed and placed in providers folder to be signed

## 2019-01-08 NOTE — TELEPHONE ENCOUNTER
Last seen: 10/14/18  RTC: 12 weeks   Cancel: none   No-show: none   Next appt: none     Incoming refill from patient via Rx auth    Medication requested:methylphenidate (METADATE ER) 20 MG CR tablet   Directions:Take one tab three times daily as needed for attention   Qty: 90  Last refilled: 12/16/18, 11/8/18, 10/8/18    Will route to provider for approval due to outside of RTC timeframe

## 2019-01-09 NOTE — TELEPHONE ENCOUNTER
- Writer received signed script from provider   - Script mailed out to [facility] per patients request   - Will notify patient via [employer]   - No further action needed by this writer

## 2019-02-08 ENCOUNTER — MYC REFILL (OUTPATIENT)
Dept: PSYCHIATRY | Facility: CLINIC | Age: 54
End: 2019-02-08

## 2019-02-08 DIAGNOSIS — F90.9 ATTENTION DEFICIT HYPERACTIVITY DISORDER (ADHD), UNSPECIFIED ADHD TYPE: ICD-10-CM

## 2019-02-11 RX ORDER — METHYLPHENIDATE HYDROCHLORIDE EXTENDED RELEASE 20 MG/1
TABLET ORAL
Qty: 90 TABLET | Refills: 0 | Status: SHIPPED | OUTPATIENT
Start: 2019-02-11 | End: 2019-03-09

## 2019-02-11 NOTE — TELEPHONE ENCOUNTER
Last seen: 10/4/18  RTC: 3-6 months   Cancel: none   No-show: none   Next appt: none     Incoming refill from patient via [employer]    Medication requested: methylphenidate (METADATE ER) 20 MG CR tablet  Directions:Take one tab three times daily as needed for attention   Qty: 90  Last refilled: 1/2/19, 12/16/18, 11/8/18    Will route to provider for approval

## 2019-02-12 ENCOUNTER — MYC MEDICAL ADVICE (OUTPATIENT)
Dept: PSYCHIATRY | Facility: CLINIC | Age: 54
End: 2019-02-12

## 2019-02-13 NOTE — TELEPHONE ENCOUNTER
Writer received a signed script from provider. Patient called at [phone] to gather additional details on where the script needs to be sent. No answer at number provided. LVM, requesting a call back.     Will also notify patient via [employer]

## 2019-02-13 NOTE — TELEPHONE ENCOUNTER
Script mailed out to [facility] in [place] per patients request.   No further action needed by this writer

## 2019-03-09 ENCOUNTER — MYC REFILL (OUTPATIENT)
Dept: PSYCHIATRY | Facility: CLINIC | Age: 54
End: 2019-03-09

## 2019-03-09 DIAGNOSIS — F90.9 ATTENTION DEFICIT HYPERACTIVITY DISORDER (ADHD), UNSPECIFIED ADHD TYPE: ICD-10-CM

## 2019-03-11 RX ORDER — METHYLPHENIDATE HYDROCHLORIDE EXTENDED RELEASE 20 MG/1
TABLET ORAL
Qty: 90 TABLET | Refills: 0 | Status: SHIPPED | OUTPATIENT
Start: 2019-03-11 | End: 2019-04-09

## 2019-03-11 NOTE — TELEPHONE ENCOUNTER
Last seen: 10/4/19  RTC: 3-6 months   Cancel: none   No-show: none   Next appt: none     Incoming refill from patient via Rx Auth     Medication requested:methylphenidate (METADATE ER) 20 MG CR tablet   Directions: Take one tab three times daily as needed for attention  Qty: 90  Last refilled: 2/15/19, 1/12/19, 12/16/18    Will route to provider for approval

## 2019-03-11 NOTE — TELEPHONE ENCOUNTER
Writer received signed script from provider   Script mailed out to [facility] per patients request    Patient notified of the refill     No further action needed by this writer

## 2019-04-09 ENCOUNTER — MYC REFILL (OUTPATIENT)
Dept: PSYCHIATRY | Facility: CLINIC | Age: 54
End: 2019-04-09

## 2019-04-09 DIAGNOSIS — F90.9 ATTENTION DEFICIT HYPERACTIVITY DISORDER (ADHD), UNSPECIFIED ADHD TYPE: ICD-10-CM

## 2019-04-09 RX ORDER — METHYLPHENIDATE HYDROCHLORIDE EXTENDED RELEASE 20 MG/1
TABLET ORAL
Qty: 90 TABLET | Refills: 0 | Status: SHIPPED | OUTPATIENT
Start: 2019-04-09 | End: 2019-05-07

## 2019-04-09 NOTE — TELEPHONE ENCOUNTER
Last seen: 10/4/18  RTC: 12 weeks   Cancel: none   No-show: none   Next appt: none     Incoming refill from patient via [employer]     Medication requested: methylphenidate (METADATE ER) 20 MG CR tablet  Directions:Take one tab three times daily as needed for attention   Qty: 90  Last refilled: 3/15/19, 2/15/19, 1/12/19    Will route to provider for approval due to outside of RTC timeframe

## 2019-04-09 NOTE — TELEPHONE ENCOUNTER
Writer received signed script from provider.   Script mailed out to [id number] - [place], [place] - [address] AT [facility] [address]  Patient notified of the refill via [employer]     No further action needed by this writer

## 2019-05-07 ENCOUNTER — MYC REFILL (OUTPATIENT)
Dept: PSYCHIATRY | Facility: CLINIC | Age: 54
End: 2019-05-07

## 2019-05-07 DIAGNOSIS — F90.9 ATTENTION DEFICIT HYPERACTIVITY DISORDER (ADHD), UNSPECIFIED ADHD TYPE: ICD-10-CM

## 2019-05-07 NOTE — TELEPHONE ENCOUNTER
Last seen: 10/4/18  RTC: 3-6 months   Cancel: none   No-show: none   Next appt: none     Incoming refill from patient via Rx Auth     Medication requested: methylphenidate (METADATE ER) 20 MG CR tablet  Directions: Take one tab three times daily as needed for attention  Qty: 90  Last refilled: 4/12/19, 3/15/19, 2/15/19    Will route to provider for approval

## 2019-05-08 RX ORDER — METHYLPHENIDATE HYDROCHLORIDE EXTENDED RELEASE 20 MG/1
TABLET ORAL
Qty: 90 TABLET | Refills: 0 | Status: SHIPPED | OUTPATIENT
Start: 2019-05-08 | End: 2019-06-06

## 2019-05-08 NOTE — TELEPHONE ENCOUNTER
Writer received signed script from provider   Script mailed out to [facility] pharmacy at [address] [facility] [address] [id number] to avoid delaying in refill     No further action needed by this writer

## 2019-06-06 ENCOUNTER — MYC REFILL (OUTPATIENT)
Dept: PSYCHIATRY | Facility: CLINIC | Age: 54
End: 2019-06-06

## 2019-06-06 DIAGNOSIS — F90.9 ATTENTION DEFICIT HYPERACTIVITY DISORDER (ADHD), UNSPECIFIED ADHD TYPE: ICD-10-CM

## 2019-06-06 RX ORDER — METHYLPHENIDATE HYDROCHLORIDE EXTENDED RELEASE 20 MG/1
TABLET ORAL
Qty: 90 TABLET | Refills: 0 | Status: SHIPPED | OUTPATIENT
Start: 2019-06-06 | End: 2019-07-04

## 2019-06-06 NOTE — TELEPHONE ENCOUNTER
Writer received signed script from provider   Script mailed out to [facility] pharmacy at [address] [id number]  Patient notified of the refill     No further action needed by this writer

## 2019-06-06 NOTE — TELEPHONE ENCOUNTER
Last seen: 10/4/18  RTC: 3-6 months   Cancel: none   No-show: none   Next appt: none     Incoming refill from patient via Rx auth    Medication requested: methylphenidate (METADATE ER) 20 MG CR tablet  Directions:Take one tab three times daily as needed for attention   Qty: 90  Last refilled: 5/11/19, 4/12/19, 3/15/19    Will route to provider for approval

## 2019-07-04 ENCOUNTER — MYC REFILL (OUTPATIENT)
Dept: PSYCHIATRY | Facility: CLINIC | Age: 54
End: 2019-07-04

## 2019-07-04 DIAGNOSIS — F90.9 ATTENTION DEFICIT HYPERACTIVITY DISORDER (ADHD), UNSPECIFIED ADHD TYPE: ICD-10-CM

## 2019-07-05 NOTE — TELEPHONE ENCOUNTER
Last seen: 10/4/18  RTC: 3-6 months   Cancel: none   No-show: none   Next appt: none     Incoming refill from patient via [employer]     Medication requested: methylphenidate (METADATE ER) 20 MG CR tablet  Directions: Take one tab three times daily as needed for attention  Qty: 90  Last refilled: 6/8/19, 5/11/19, 4/12/19    Will route to provider for approval due to outside of RTC timeframe

## 2019-07-08 RX ORDER — METHYLPHENIDATE HYDROCHLORIDE EXTENDED RELEASE 20 MG/1
TABLET ORAL
Qty: 90 TABLET | Refills: 0 | Status: SHIPPED | OUTPATIENT
Start: 2019-07-08 | End: 2019-08-07

## 2019-07-08 NOTE — TELEPHONE ENCOUNTER
Writer received signed script from provider.   Script mailed out to [id number] - [place], [place] - [address] AT [facility] [address] per patients request     No further action needed by this writer

## 2019-07-26 DIAGNOSIS — F41.1 GENERALIZED ANXIETY DISORDER: ICD-10-CM

## 2019-07-29 RX ORDER — GABAPENTIN 100 MG/1
CAPSULE ORAL
Qty: 210 CAPSULE | Refills: 5 | Status: SHIPPED | OUTPATIENT
Start: 2019-07-29 | End: 2019-08-07

## 2019-07-29 NOTE — TELEPHONE ENCOUNTER
Last seen: 10/4/18  RTC: 3-6 months   Cancel: none   No-show: none   Next appt: 8/7/19    Incoming refill from pharmacy via Rx Auth     Medication requested: gabapentin (NEURONTIN) 100 MG capsule  Directions: Take 2 capsules (200 mg) in the morning, 3 capsules (300 mg) at noon, and 2 capsules (200 mg) at bedtime  Qty: 210  Last refilled: 10/4/18    Post reviewing the chart patient should have ran out of the meds few months ago. Writer placed a call to patient at [phone] to confirm. No answer at number provide. LVM, requesting a call back. Clinic number provided.

## 2019-07-29 NOTE — TELEPHONE ENCOUNTER
Meds refilled by provider   Med tab changed to reflect this     No further action needed by this writer

## 2019-08-07 ENCOUNTER — OFFICE VISIT (OUTPATIENT)
Dept: PSYCHIATRY | Facility: CLINIC | Age: 54
End: 2019-08-07
Attending: NURSE PRACTITIONER
Payer: COMMERCIAL

## 2019-08-07 VITALS — SYSTOLIC BLOOD PRESSURE: 135 MMHG | WEIGHT: 215 LBS | DIASTOLIC BLOOD PRESSURE: 89 MMHG | HEART RATE: 94 BPM

## 2019-08-07 DIAGNOSIS — F41.1 GENERALIZED ANXIETY DISORDER: ICD-10-CM

## 2019-08-07 DIAGNOSIS — F90.9 ATTENTION DEFICIT HYPERACTIVITY DISORDER (ADHD), UNSPECIFIED ADHD TYPE: ICD-10-CM

## 2019-08-07 DIAGNOSIS — F33.0 MAJOR DEPRESSIVE DISORDER, RECURRENT EPISODE, MILD (H): ICD-10-CM

## 2019-08-07 PROCEDURE — G0463 HOSPITAL OUTPT CLINIC VISIT: HCPCS | Mod: ZF

## 2019-08-07 RX ORDER — GABAPENTIN 100 MG/1
CAPSULE ORAL
Qty: 210 CAPSULE | Refills: 5 | Status: SHIPPED | OUTPATIENT
Start: 2019-08-07 | End: 2020-01-03

## 2019-08-07 RX ORDER — METHYLPHENIDATE HYDROCHLORIDE EXTENDED RELEASE 20 MG/1
TABLET ORAL
Qty: 90 TABLET | Refills: 0 | Status: SHIPPED | OUTPATIENT
Start: 2019-08-07 | End: 2019-11-12

## 2019-08-07 RX ORDER — ESCITALOPRAM OXALATE 20 MG/1
20 TABLET ORAL DAILY
Qty: 30 TABLET | Refills: 5 | Status: SHIPPED | OUTPATIENT
Start: 2019-08-07 | End: 2020-01-03

## 2019-08-07 RX ORDER — METHYLPHENIDATE HYDROCHLORIDE EXTENDED RELEASE 20 MG/1
TABLET ORAL
Qty: 90 TABLET | Refills: 0 | Status: SHIPPED | OUTPATIENT
Start: 2019-08-07 | End: 2019-08-07

## 2019-08-07 ASSESSMENT — PATIENT HEALTH QUESTIONNAIRE - PHQ9: SUM OF ALL RESPONSES TO PHQ QUESTIONS 1-9: 10

## 2019-08-07 ASSESSMENT — PAIN SCALES - GENERAL: PAINLEVEL: SEVERE PAIN (6)
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
## 2019-08-16 ENCOUNTER — TELEPHONE (OUTPATIENT)
Dept: PSYCHIATRY | Facility: CLINIC | Age: 54
End: 2019-08-16
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
## 2019-08-21 NOTE — TELEPHONE ENCOUNTER
Patient Request for Note/Letter (time off work )     Fior Bazan, JOSELUIS CNP  You; Jackie Alicea LIC 9 minutes ago (3:32 PM)      Spoke with Jyothi after she talked to their  who wants the letter to come from her medical provider and is not needed from writer. She is trying to get in with PCP on Monday who has been on vacation.    Routing comment

## 2019-09-28 ENCOUNTER — HEALTH MAINTENANCE LETTER (OUTPATIENT)
Age: 54
End: 2019-09-28

## 2019-11-12 ENCOUNTER — MYC REFILL (OUTPATIENT)
Dept: PSYCHIATRY | Facility: CLINIC | Age: 54
End: 2019-11-12

## 2019-11-12 DIAGNOSIS — F90.9 ATTENTION DEFICIT HYPERACTIVITY DISORDER (ADHD), UNSPECIFIED ADHD TYPE: ICD-10-CM

## 2019-11-12 NOTE — TELEPHONE ENCOUNTER
Last seen: 8/7/19  RTC: 6 months   Cancel: none   No-show: none   Next appt: none     Incoming refill from patient via [employer]     Medication requested: methylphenidate (METADATE ER) 20 MG CR tablet  Directions: Take one tab three times daily as needed for attention  Qty: 90  Last refilled: 10/16/19 #90, 9/17/19 #90, 8/15/19 #90    Will route to provider for approval

## 2019-11-13 RX ORDER — METHYLPHENIDATE HYDROCHLORIDE EXTENDED RELEASE 20 MG/1
TABLET ORAL
Qty: 90 TABLET | Refills: 0 | Status: SHIPPED | OUTPATIENT
Start: 2019-11-13 | End: 2019-12-11

## 2019-11-13 NOTE — TELEPHONE ENCOUNTER
Meds refilled by provider   Med tab changed to reflect this   Patient notified via [employer]     No further action needed by this writer

## 2019-12-11 ENCOUNTER — MYC REFILL (OUTPATIENT)
Dept: PSYCHIATRY | Facility: CLINIC | Age: 54
End: 2019-12-11

## 2019-12-11 DIAGNOSIS — F90.9 ATTENTION DEFICIT HYPERACTIVITY DISORDER (ADHD), UNSPECIFIED ADHD TYPE: ICD-10-CM

## 2019-12-11 RX ORDER — METHYLPHENIDATE HYDROCHLORIDE EXTENDED RELEASE 20 MG/1
TABLET ORAL
Qty: 90 TABLET | Refills: 0 | Status: SHIPPED | OUTPATIENT
Start: 2019-12-11 | End: 2020-01-03

## 2019-12-11 NOTE — TELEPHONE ENCOUNTER
Last seen: 8/7/19  RTC: 6 months   Cancel: none   No-show: none   Next appt: 1/3/20    Incoming refill from patient via [employer]     Medication requested: methylphenidate (METADATE ER) 20 MG CR tablet  Directions: Take one tab three times daily as needed for attention  Qty: 90  Last refilled: 11/13/19 #90, 10/16/19 #90, 9/17/19 #90     Will route to provider for approval

## 2020-01-03 ENCOUNTER — OFFICE VISIT (OUTPATIENT)
Dept: PSYCHIATRY | Facility: CLINIC | Age: 55
End: 2020-01-03
Attending: NURSE PRACTITIONER
Payer: COMMERCIAL

## 2020-01-03 VITALS — WEIGHT: 212 LBS | HEART RATE: 83 BPM | DIASTOLIC BLOOD PRESSURE: 95 MMHG | SYSTOLIC BLOOD PRESSURE: 139 MMHG

## 2020-01-03 DIAGNOSIS — F33.0 MAJOR DEPRESSIVE DISORDER, RECURRENT EPISODE, MILD (H): ICD-10-CM

## 2020-01-03 DIAGNOSIS — F41.1 GENERALIZED ANXIETY DISORDER: ICD-10-CM

## 2020-01-03 DIAGNOSIS — F90.9 ATTENTION DEFICIT HYPERACTIVITY DISORDER (ADHD), UNSPECIFIED ADHD TYPE: ICD-10-CM

## 2020-01-03 PROCEDURE — G0463 HOSPITAL OUTPT CLINIC VISIT: HCPCS | Mod: ZF

## 2020-01-03 RX ORDER — METHYLPHENIDATE HYDROCHLORIDE EXTENDED RELEASE 20 MG/1
TABLET ORAL
Qty: 90 TABLET | Refills: 0 | Status: SHIPPED | OUTPATIENT
Start: 2020-01-03 | End: 2020-01-03

## 2020-01-03 RX ORDER — METHYLPHENIDATE HYDROCHLORIDE EXTENDED RELEASE 20 MG/1
TABLET ORAL
Qty: 90 TABLET | Refills: 0 | Status: SHIPPED | OUTPATIENT
Start: 2020-01-03 | End: 2020-02-18

## 2020-01-03 RX ORDER — ESCITALOPRAM OXALATE 20 MG/1
20 TABLET ORAL DAILY
Qty: 30 TABLET | Refills: 2 | Status: SHIPPED | OUTPATIENT
Start: 2020-01-03 | End: 2020-04-03

## 2020-01-03 RX ORDER — GABAPENTIN 100 MG/1
CAPSULE ORAL
Qty: 210 CAPSULE | Refills: 2 | Status: SHIPPED | OUTPATIENT
Start: 2020-01-03 | End: 2020-04-15

## 2020-01-03 ASSESSMENT — PAIN SCALES - GENERAL: PAINLEVEL: MILD PAIN (2)
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
## 2020-02-18 ENCOUNTER — MYC REFILL (OUTPATIENT)
Dept: PSYCHIATRY | Facility: CLINIC | Age: 55
End: 2020-02-18

## 2020-02-18 DIAGNOSIS — F90.9 ATTENTION DEFICIT HYPERACTIVITY DISORDER (ADHD), UNSPECIFIED ADHD TYPE: ICD-10-CM

## 2020-02-18 NOTE — TELEPHONE ENCOUNTER
Last seen: 1/3/2020  RTC: 3 months  Cancel: None  No-show: None  Next appt: None     Medication requested: methylphenidate (METADATE ER) 20 MG CR tablet  Directions: Take one tab three times daily as needed for attention  Qty: 90  Last refilled: 1/8/20, 12/11/19, 11/13/19 per [place]      Medication refill approved per refill protocol. Pended order submitted to Fior Bazan to sign off.

## 2020-02-19 RX ORDER — METHYLPHENIDATE HYDROCHLORIDE EXTENDED RELEASE 20 MG/1
TABLET ORAL
Qty: 90 TABLET | Refills: 0 | Status: SHIPPED | OUTPATIENT
Start: 2020-02-19 | End: 2020-04-15

## 2020-02-19 RX ORDER — METHYLPHENIDATE HYDROCHLORIDE EXTENDED RELEASE 20 MG/1
20 TABLET ORAL 3 TIMES DAILY PRN
Qty: 90 TABLET | Refills: 0 | Status: SHIPPED | OUTPATIENT
Start: 2020-03-18 | End: 2020-04-15

## 2020-02-19 NOTE — TELEPHONE ENCOUNTER
Message   Received: Today   Message Contents   Fior Bazan APRN CNP Moccio, Emily, RN   Caller: Unspecified (Yesterday, 11:03 AM)   Phone Number: [phone]             Thanks for thinking of this. Two for now is fine. I'll see if I can sign the one you just sent now.      --Writer pended an additional refill for 2 total scripts and routed to provider to sign.

## 2020-04-02 DIAGNOSIS — F33.0 MAJOR DEPRESSIVE DISORDER, RECURRENT EPISODE, MILD (H): ICD-10-CM

## 2020-04-03 RX ORDER — ESCITALOPRAM OXALATE 20 MG/1
TABLET ORAL
Qty: 30 TABLET | Refills: 0 | Status: SHIPPED | OUTPATIENT
Start: 2020-04-03 | End: 2020-04-15

## 2020-04-03 NOTE — TELEPHONE ENCOUNTER
Medication requested: ESCITALOPRAM 20MG TABLETS    Last refilled: 1/3/20  Qty: 30/2 rfl      Last seen: 1/3/20  RTC: 12 weeks  Cancel: 0  No-show: 0  Next appt: 0.   Scheduling has been notified to contact the pt for appointment.      Refill decision:   Refilled for 30 days per protocol.Scheduling has been notified to contact the pt for appointment.

## 2020-04-15 ENCOUNTER — VIRTUAL VISIT (OUTPATIENT)
Dept: PSYCHIATRY | Facility: CLINIC | Age: 55
End: 2020-04-15
Attending: NURSE PRACTITIONER
Payer: COMMERCIAL

## 2020-04-15 DIAGNOSIS — F90.9 ATTENTION DEFICIT HYPERACTIVITY DISORDER (ADHD), UNSPECIFIED ADHD TYPE: ICD-10-CM

## 2020-04-15 DIAGNOSIS — F41.1 GENERALIZED ANXIETY DISORDER: ICD-10-CM

## 2020-04-15 DIAGNOSIS — F33.0 MAJOR DEPRESSIVE DISORDER, RECURRENT EPISODE, MILD (H): ICD-10-CM

## 2020-04-15 RX ORDER — ESCITALOPRAM OXALATE 20 MG/1
20 TABLET ORAL DAILY
Qty: 30 TABLET | Refills: 2 | Status: SHIPPED | OUTPATIENT
Start: 2020-04-15 | End: 2020-07-23

## 2020-04-15 RX ORDER — METHYLPHENIDATE HYDROCHLORIDE EXTENDED RELEASE 20 MG/1
TABLET ORAL
Qty: 90 TABLET | Refills: 0 | Status: SHIPPED | OUTPATIENT
Start: 2020-04-15 | End: 2020-07-20

## 2020-04-15 RX ORDER — GABAPENTIN 100 MG/1
CAPSULE ORAL
Qty: 210 CAPSULE | Refills: 2 | Status: SHIPPED | OUTPATIENT
Start: 2020-04-15 | End: 2020-07-15

## 2020-04-15 RX ORDER — METHYLPHENIDATE HYDROCHLORIDE EXTENDED RELEASE 20 MG/1
20 TABLET ORAL 3 TIMES DAILY PRN
Qty: 90 TABLET | Refills: 0 | Status: SHIPPED | OUTPATIENT
Start: 2020-04-15 | End: 2020-08-20
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
## 2020-04-18 DIAGNOSIS — F41.1 GENERALIZED ANXIETY DISORDER: ICD-10-CM

## 2020-04-20 RX ORDER — GABAPENTIN 100 MG/1
CAPSULE ORAL
Qty: 210 CAPSULE | Refills: 2 | OUTPATIENT
Start: 2020-04-20

## 2020-05-02 DIAGNOSIS — F33.0 MAJOR DEPRESSIVE DISORDER, RECURRENT EPISODE, MILD (H): ICD-10-CM

## 2020-05-04 RX ORDER — ESCITALOPRAM OXALATE 20 MG/1
TABLET ORAL
Qty: 30 TABLET | Refills: 2 | OUTPATIENT
Start: 2020-05-04

## 2020-07-14 DIAGNOSIS — F41.1 GENERALIZED ANXIETY DISORDER: ICD-10-CM

## 2020-07-15 RX ORDER — GABAPENTIN 100 MG/1
CAPSULE ORAL
Qty: 210 CAPSULE | Refills: 2 | Status: SHIPPED | OUTPATIENT
Start: 2020-07-15 | End: 2022-04-11

## 2020-07-15 NOTE — TELEPHONE ENCOUNTER
Medication requested: GABAPENTIN 100MG CAPSULES   Last refilled: 6-14-20  Qty: 210      Last seen: 4-15-20  RTC: 6 months  Cancel: 0  No-show: 0  Next appt: none    Refill decision:   Refill pended and routed to the provider for review/determination due to medication requires provider review      Scheduling has been notified to contact the pt for appointment.

## 2020-07-20 DIAGNOSIS — F90.9 ATTENTION DEFICIT HYPERACTIVITY DISORDER (ADHD), UNSPECIFIED ADHD TYPE: ICD-10-CM

## 2020-07-20 RX ORDER — METHYLPHENIDATE HYDROCHLORIDE EXTENDED RELEASE 20 MG/1
TABLET ORAL
Qty: 90 TABLET | Refills: 0 | Status: SHIPPED | OUTPATIENT
Start: 2020-07-20 | End: 2020-10-07

## 2020-07-20 NOTE — TELEPHONE ENCOUNTER
[facility] Call Center    Phone Message    May a detailed message be left on voicemail: yes     Reason for Call: Medication Refill Request    Has the patient contacted the pharmacy for the refill? Yes   Name of medication being requested: methylphenidate  Provider who prescribed the medication: Fior Bazan  Pharmacy:      [id number] - [place], [place] - [address] AT [address]    Date medication is needed:     I placed an outgoing call to pt per the refill team to get her scheduled for a regular 6 month MFU in October. Pt is scheduled 10/6, but then mentioned that she needs more refills for the methylphenidate sent to her pharmacy. She's not sure how many refills Fior sent in last time they met.    Action Taken: Message routed to:  Other: nursing pool    Travel Screening: Not Applicable

## 2020-07-20 NOTE — TELEPHONE ENCOUNTER
Last seen: 4/15  RTC: 6 months   Cancel: none   No-show: none   Next appt: 10/7    Incoming refill from patient via phone     Medication requested: methylphenidate (METADATE ER) 20 MG CR table  Directions: Take one tab three times daily as needed for attention  Qty: 90  Last refilled: 6/22 #90, 5/22 #90, 4/22 #90    Will route to provider for approval

## 2020-07-20 NOTE — TELEPHONE ENCOUNTER
- Meds refilled by provider   - Med tab changed to reflect this   - Placed a call to patient to update her of the refill. No answer at number provided. Unable to LVM due to mailbox full.     No further action needed by this writer

## 2020-07-23 DIAGNOSIS — F33.0 MAJOR DEPRESSIVE DISORDER, RECURRENT EPISODE, MILD (H): ICD-10-CM

## 2020-07-23 RX ORDER — ESCITALOPRAM OXALATE 20 MG/1
TABLET ORAL
Qty: 30 TABLET | Refills: 2 | Status: SHIPPED | OUTPATIENT
Start: 2020-07-23 | End: 2020-10-07

## 2020-07-23 NOTE — TELEPHONE ENCOUNTER
Medication requested: ESCITALOPRAM 20MG TABLETS    Last refilled: 4/15/20  Qty: 30/2      Last seen: 4/15/20  RTC: 6 months  Cancel: 0  No-show: 0  Next appt: 10/7/20    Refill decision:   Refilled per protocol.

## 2020-08-19 DIAGNOSIS — F90.9 ATTENTION DEFICIT HYPERACTIVITY DISORDER (ADHD), UNSPECIFIED ADHD TYPE: ICD-10-CM

## 2020-08-19 NOTE — TELEPHONE ENCOUNTER
refill request   Received: Today   Message Contents   Tamera Thurston Radhika, RN    Phone Number: [phone]               Caller:  Jyothi   Medication:  Methylphenidate 20 mg 3 times a day   Pharmacy and location:  [facility]   Have you contacted the pharmacy: Yes   How much of medication do you have left:  ?   Pending appt: Yes   Okay to leave VM:  Yes     Thanks!      Last seen: 4/15  RTC: 6 months   Cancel: none   No-show: none   Next appt: 10/7    Incoming refill from patient via phone    Medication requested: methylphenidate (METADATE ER) 20 MG CR tablet   Directions: Take one tab three times daily as needed for attention   Qty: 90  Last refilled: 7/20 #90, 6/22 #90, 5/22 #90    Will route to provider for approval

## 2020-08-20 RX ORDER — METHYLPHENIDATE HYDROCHLORIDE EXTENDED RELEASE 20 MG/1
20 TABLET ORAL 3 TIMES DAILY PRN
Qty: 90 TABLET | Refills: 0 | Status: SHIPPED | OUTPATIENT
Start: 2020-08-20 | End: 2020-09-18

## 2020-09-18 ENCOUNTER — MYC REFILL (OUTPATIENT)
Dept: PSYCHIATRY | Facility: CLINIC | Age: 55
End: 2020-09-18

## 2020-09-18 DIAGNOSIS — F90.9 ATTENTION DEFICIT HYPERACTIVITY DISORDER (ADHD), UNSPECIFIED ADHD TYPE: ICD-10-CM

## 2020-09-18 RX ORDER — METHYLPHENIDATE HYDROCHLORIDE EXTENDED RELEASE 20 MG/1
20 TABLET ORAL 3 TIMES DAILY PRN
Qty: 90 TABLET | Refills: 0 | Status: SHIPPED | OUTPATIENT
Start: 2020-09-18 | End: 2020-10-07

## 2020-09-18 NOTE — TELEPHONE ENCOUNTER
Last seen: 4/15  RTC: 6 months   Cancel: none   No-show: none   Next appt: 10/7    Incoming refill from patient via [employer]    Medication requested: methylphenidate (METADATE ER) 20 MG CR tablet   Directions: Take 1 tablet (20 mg) by mouth 3 times daily as needed (attention) - Oral  Qty: 90  Last refilled: 8/20 #90, 7/20 #90, 6/22 #90    Will route to provider for approval

## 2020-09-18 NOTE — TELEPHONE ENCOUNTER
- Meds refilled by provider   - Med tab changed to reflect this   - Patient notified via [employer]

## 2020-10-07 ENCOUNTER — TELEPHONE (OUTPATIENT)
Dept: PSYCHIATRY | Facility: CLINIC | Age: 55
End: 2020-10-07

## 2020-10-07 ENCOUNTER — VIRTUAL VISIT (OUTPATIENT)
Dept: PSYCHIATRY | Facility: CLINIC | Age: 55
End: 2020-10-07
Attending: NURSE PRACTITIONER
Payer: COMMERCIAL

## 2020-10-07 DIAGNOSIS — F90.9 ATTENTION DEFICIT HYPERACTIVITY DISORDER (ADHD), UNSPECIFIED ADHD TYPE: ICD-10-CM

## 2020-10-07 DIAGNOSIS — F33.0 MAJOR DEPRESSIVE DISORDER, RECURRENT EPISODE, MILD (H): ICD-10-CM

## 2020-10-07 PROCEDURE — 99214 OFFICE O/P EST MOD 30 MIN: CPT | Mod: GT | Performed by: NURSE PRACTITIONER

## 2020-10-07 RX ORDER — METHYLPHENIDATE HYDROCHLORIDE EXTENDED RELEASE 20 MG/1
20 TABLET ORAL 3 TIMES DAILY PRN
Qty: 90 TABLET | Refills: 0 | Status: SHIPPED | OUTPATIENT
Start: 2020-10-07 | End: 2020-12-17

## 2020-10-07 RX ORDER — ESCITALOPRAM OXALATE 20 MG/1
20 TABLET ORAL DAILY
Qty: 30 TABLET | Refills: 5 | Status: SHIPPED | OUTPATIENT
Start: 2020-10-07 | End: 2021-04-28

## 2020-10-07 RX ORDER — METHYLPHENIDATE HYDROCHLORIDE EXTENDED RELEASE 20 MG/1
TABLET ORAL
Qty: 90 TABLET | Refills: 0 | Status: SHIPPED | OUTPATIENT
Start: 2020-10-07 | End: 2021-08-04

## 2020-10-07 NOTE — TELEPHONE ENCOUNTER
On 10/7/2020, at 1508, writer called patient at mobile to confirm Virtual Visit. Writer unable to make contact with patient. Writer left detailed voice message for callback. [phone], left as call back number. JESS Ingram, EMT

## 2020-10-11 DIAGNOSIS — F41.1 GENERALIZED ANXIETY DISORDER: ICD-10-CM

## 2020-10-14 RX ORDER — GABAPENTIN 100 MG/1
CAPSULE ORAL
Qty: 210 CAPSULE | Refills: 2 | OUTPATIENT
Start: 2020-10-14

## 2020-11-18 ENCOUNTER — MYC REFILL (OUTPATIENT)
Dept: PSYCHIATRY | Facility: CLINIC | Age: 55
End: 2020-11-18

## 2020-11-18 DIAGNOSIS — F90.9 ATTENTION DEFICIT HYPERACTIVITY DISORDER (ADHD), UNSPECIFIED ADHD TYPE: ICD-10-CM

## 2020-11-18 RX ORDER — METHYLPHENIDATE HYDROCHLORIDE EXTENDED RELEASE 20 MG/1
20 TABLET ORAL 3 TIMES DAILY PRN
Qty: 90 TABLET | Refills: 0 | Status: CANCELLED | OUTPATIENT
Start: 2020-11-18

## 2020-11-18 NOTE — TELEPHONE ENCOUNTER
Last seen: 10/7  RTC: 6 months   Cancel: none   No-show: none   Next appt: none     Incoming refill from patient via [employer]     Medication requested: methylphenidate (METADATE ER) 20 MG CR tablet  Directions: Take 1 tablet (20 mg) by mouth 3 times daily as needed (attention) - Oral  Qty: 90  Last refilled: 10/20 #90, 9/22 #90, 8/20 #90     Per chart review, patient should have refills on file. Placed a call to [id number] - [place], [place] - [address] AT [address] and confirmed refills on file. Patient notified via [employer].

## 2020-12-17 ENCOUNTER — MYC REFILL (OUTPATIENT)
Dept: PSYCHIATRY | Facility: CLINIC | Age: 55
End: 2020-12-17

## 2020-12-17 DIAGNOSIS — F90.9 ATTENTION DEFICIT HYPERACTIVITY DISORDER (ADHD), UNSPECIFIED ADHD TYPE: ICD-10-CM

## 2020-12-18 RX ORDER — METHYLPHENIDATE HYDROCHLORIDE EXTENDED RELEASE 20 MG/1
20 TABLET ORAL 3 TIMES DAILY PRN
Qty: 90 TABLET | Refills: 0 | Status: SHIPPED | OUTPATIENT
Start: 2020-12-18 | End: 2021-01-15

## 2020-12-18 NOTE — TELEPHONE ENCOUNTER
Last seen: 10/7  RTC: 6 months  Cancel: none  No-show: none  Next appt: none       Disp Refills Start End MARÍA    methylphenidate (METADATE ER) 20 MG CR tablet 90 tablet 0 10/7/2020  No   Sig - Route: Take 1 tablet (20 mg) by mouth 3 times daily as needed (attention) - Oral       Last refilled per : 11/18 #90, 10/20 #90, 9/22 #90    Medication pended and routed to provider for approval.

## 2021-01-10 ENCOUNTER — HEALTH MAINTENANCE LETTER (OUTPATIENT)
Age: 56
End: 2021-01-10

## 2021-01-12 DIAGNOSIS — F41.1 GENERALIZED ANXIETY DISORDER: ICD-10-CM

## 2021-01-13 RX ORDER — GABAPENTIN 100 MG/1
CAPSULE ORAL
Qty: 210 CAPSULE | Refills: 2 | OUTPATIENT
Start: 2021-01-13

## 2021-01-15 ENCOUNTER — MYC REFILL (OUTPATIENT)
Dept: PSYCHIATRY | Facility: CLINIC | Age: 56
End: 2021-01-15

## 2021-01-15 DIAGNOSIS — F90.9 ATTENTION DEFICIT HYPERACTIVITY DISORDER (ADHD), UNSPECIFIED ADHD TYPE: ICD-10-CM

## 2021-01-15 RX ORDER — METHYLPHENIDATE HYDROCHLORIDE EXTENDED RELEASE 20 MG/1
20 TABLET ORAL 3 TIMES DAILY PRN
Qty: 90 TABLET | Refills: 0 | Status: SHIPPED | OUTPATIENT
Start: 2021-01-15 | End: 2021-02-12

## 2021-01-15 NOTE — TELEPHONE ENCOUNTER
Last seen: 10/7  RTC: 6 months   Cancel: none   No-show: none   Next appt: none     Incoming refill from patient via [employer]     Medication requested: methylphenidate (METADATE ER) 20 MG CR tablet  Directions: Take 1 tablet (20 mg) by mouth 3 times daily as needed (attention) - Oral  Qty: 90  Last refilled: 12/18 #90, 11/18 #90, 10/20 #90    Will route to provider for approval

## 2021-01-15 NOTE — TELEPHONE ENCOUNTER
- Meds refilled by provider  - Med tab changed to reflect this   - Patient notified via [employer]

## 2021-02-10 ENCOUNTER — VIRTUAL VISIT (OUTPATIENT)
Dept: PSYCHOLOGY | Facility: CLINIC | Age: 56
End: 2021-02-10
Payer: COMMERCIAL

## 2021-02-10 DIAGNOSIS — F43.10 POST TRAUMATIC STRESS DISORDER: Primary | ICD-10-CM

## 2021-02-10 DIAGNOSIS — F33.42 MAJOR DEPRESSIVE DISORDER, RECURRENT EPISODE, IN FULL REMISSION (H): ICD-10-CM

## 2021-02-10 PROCEDURE — 90834 PSYTX W PT 45 MINUTES: CPT | Mod: GT | Performed by: SOCIAL WORKER

## 2021-02-12 ENCOUNTER — MYC REFILL (OUTPATIENT)
Dept: PSYCHIATRY | Facility: CLINIC | Age: 56
End: 2021-02-12

## 2021-02-12 DIAGNOSIS — F90.9 ATTENTION DEFICIT HYPERACTIVITY DISORDER (ADHD), UNSPECIFIED ADHD TYPE: ICD-10-CM

## 2021-02-12 RX ORDER — METHYLPHENIDATE HYDROCHLORIDE EXTENDED RELEASE 20 MG/1
20 TABLET ORAL 3 TIMES DAILY PRN
Qty: 90 TABLET | Refills: 0 | Status: SHIPPED | OUTPATIENT
Start: 2021-02-12 | End: 2021-03-12

## 2021-02-12 NOTE — TELEPHONE ENCOUNTER
Last seen: 10/7  RTC: 6 months   Cancel: none   No-show: none   Next appt: none     Incoming refill from patient via [employer]     Medication requested: methylphenidate (METADATE ER) 20 MG CR tablet  Directions: Take 1 tablet (20 mg) by mouth 3 times daily as needed (attention) - Oral  Qty: 90  Last refilled: 1/15 #90, 12/18 #90, 11/18 #90     Will route to provider for approval

## 2021-02-15 ASSESSMENT — COLUMBIA-SUICIDE SEVERITY RATING SCALE - C-SSRS
1. IN THE PAST MONTH, HAVE YOU WISHED YOU WERE DEAD OR WISHED YOU COULD GO TO SLEEP AND NOT WAKE UP?: YES
ATTEMPT LIFETIME: NO
1. IN THE PAST MONTH, HAVE YOU WISHED YOU WERE DEAD OR WISHED YOU COULD GO TO SLEEP AND NOT WAKE UP?: NO
REASONS FOR IDEATION LIFETIME: COMPLETELY TO END OR STOP THE PAIN (YOU COULDN'T GO ON LIVING WITH THE PAIN OR HOW YOU WERE FEELING)

## 2021-02-15 NOTE — PROGRESS NOTES
Progress Note - Initial Visit    Client Name:  Jyothi Pierre Date: 2.10.21         Service Type: Individual     Visit Start Time: 130pm  Visit End Time: 220pm    Visit #: 1    Attendees: Client attended alone    Service Modality:  Video Visit:      Provider verified identity through the following two step process.  Patient provided:  Patient photo    Telemedicine Visit: The patient's condition can be safely assessed and treated via synchronous audio and visual telemedicine encounter.      Reason for Telemedicine Visit: Patient has requested telehealth visit    Originating Site (Patient Location): Patient's home    Distant Site (Provider Location): [facility] [place]    Consent:  The patient/guardian has verbally consented to: the potential risks and benefits of telemedicine (video visit) versus in person care; bill my insurance or make self-payment for services provided; and responsibility for payment of non-covered services.     Patient would like the video invitation sent by:  My Chart    Mode of Communication:  Video Conference via [employer]    As the provider I attest to compliance with applicable laws and regulations related to telemedicine.       DATA:   Interactive Complexity: No   Crisis: No     Presenting Concerns/  Current Stressors: Collecting info regarding pt's presenting concern, hx of presenting concern, and discussed importance of utilizing sense to reground self to present moment when beginning to dissociate from triggers of past trauma.      ASSESSMENT:  Mental Status Assessment:  Appearance:   Appropriate   Eye Contact:   Good   Psychomotor Behavior: Normal   Attitude:   Cooperative   Orientation:   All  Speech   Rate / Production: Normal/ Responsive   Volume:  Normal   Mood:    Anxious   Affect:    Appropriate   Thought Content:  Clear   Thought Form:  Coherent   Insight:    Fair       Safety Issues and Plan for Safety and Risk Management:     Means Suicide  Severity Rating Scale (Lifetime/Recent)  [place] Suicide Severity Rating (Lifetime/Recent) 2/15/2021   1. Wish to be Dead (Lifetime) Yes   1. Wish to be Dead (Recent) No   Most Severe Ideation Rating (Lifetime) 3   Frequency (Lifetime) 3   Duration (Lifetime) 3   Controllability (Lifetime) 2   Protective Factors  (Lifetime) 2   Reasons for Ideation (Lifetime) 5   Most Severe Ideation Rating (Past Month) NA   Frequency (Past Month) NA   Duration (Past Month) NA   Controllability (Past Month) NA   Protective Factors (Past Month) NA   Reasons for Ideation (Past Month) NA   Actual Attempt (Lifetime) No     Patient denies current fears or concerns for personal safety.  Patient denies current or recent suicidal ideation or behaviors.  Patient denies current or recent homicidal ideation or behaviors.  Patient denies current or recent self injurious behavior or ideation.  Patient denies other safety concerns.  Recommended that patient call 911 or go to the local ED should there be a change in any of these risk factors.  Patient reports there are no firearms in the house.     Diagnostic Criteria:  CRITERIA (A-C) REPRESENT A MAJOR DEPRESSIVE EPISODE - SELECT THESE CRITERIA  A) Recurrent episode(s) - symptoms have been present during the same 2-week period and represent a change from previous functioning 5 or more symptoms (required for diagnosis)   - Depressed mood. Note: In children and adolescents, can be irritable mood.     - Diminished interest or pleasure in all, or almost all, activities.    - Decreased sleep.    - Psychomotor activity agitation.    - Fatigue or loss of energy.    - Feelings of worthlessness or inappropriate and excessive guilt.    - Diminished ability to think or concentrate, or indecisiveness.   B) The symptoms cause clinically significant distress or impairment in social, occupational, or other important areas of functioning  C) The episode is not attributable to the physiological effects of a  substance or to another medical condition  D) The occurence of major depressive episode is not better explained by other thought / psychotic disorders  E) There has never been a manic episode or hypomanic episode  A. The person has been exposed to a traumatic event in which both of the following were present:     (1) the person experienced, witnessed, or was confronted with an event or events that involved actual or threatened death or serious injury, or a threat to the physical integrity of self or others     (2) the person's response involved intense fear, helplessness, or horror. Note: In children, this may be expressed instead by disorganized or agitated behavior  B. The traumatic event is persistently reexperienced in one (or more) of the following ways:     - Recurrent and intrusive distressing recollections of the event, including images, thoughts, or perceptions. Note: In young children, repetitive play may occur in which themes or aspects of the trauma are expressed.      - Recurrent distressing dreams of the event. Note: In children, there may be frightening dreams without recognizable content.      - Intense psychological distress at exposure to internal or external cues that symbolize or resemble an aspect of the traumatic event.      - Physiological reactivity on exposure to internal or external cues that symbolize or resemble an aspect of the traumatic event.   C. Persistent avoidance of stimuli associated with the trauma and numbing of general responsiveness (not present before the trauma), as indicated by three (or more) of the following:     - Efforts to avoid thoughts, feelings, or conversations associated with the trauma.      - Efforts to avoid activities, places, or people that arouse recollections of the trauma.      - Feeling of detachment or estrangement from others.   D. Persistent symptoms of increased arousal (not present before the trauma), as indicated by two (or more) of the  following:     - Difficulty falling or staying asleep.      - Difficulty concentrating.      - Hypervigilance.      - Exaggerated startle response.   E. Duration of the disturbance is more than 1 month.  F. The disturbance causes clinically significant distress or impairment in social, occupational, or other important areas of functioning.      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  Intervention:   Grounding skills for trauma  Collateral Reports Completed:  Not Applicable      PLAN: (Homework, other):  1. Provider will continue Diagnostic Assessment.  Patient was given the following to do until next session:  Pt will find an item that she can utilize to ground herself when becoming emotionally dysregulated.    3.  Safety plan created.  Provider recommended that patient  Call 911 if suicidal thoughts develop.       ALYSON Alejandro, LGSW  February 15, 2021  This note has been reviewed and I agree with the plan of care. This note is co-signed by ALYSON Gage, [facility], Supervisor, on: 2/21/21

## 2021-03-03 ENCOUNTER — VIRTUAL VISIT (OUTPATIENT)
Dept: PSYCHOLOGY | Facility: CLINIC | Age: 56
End: 2021-03-03
Payer: COMMERCIAL

## 2021-03-03 DIAGNOSIS — F43.10 POST TRAUMATIC STRESS DISORDER: Primary | ICD-10-CM

## 2021-03-03 DIAGNOSIS — F33.42 MAJOR DEPRESSIVE DISORDER, RECURRENT EPISODE, IN FULL REMISSION (H): ICD-10-CM

## 2021-03-03 PROCEDURE — 90791 PSYCH DIAGNOSTIC EVALUATION: CPT | Mod: GT | Performed by: SOCIAL WORKER
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
## 2021-03-12 ENCOUNTER — MYC REFILL (OUTPATIENT)
Dept: PSYCHIATRY | Facility: CLINIC | Age: 56
End: 2021-03-12

## 2021-03-12 DIAGNOSIS — F90.9 ATTENTION DEFICIT HYPERACTIVITY DISORDER (ADHD), UNSPECIFIED ADHD TYPE: ICD-10-CM

## 2021-03-12 RX ORDER — METHYLPHENIDATE HYDROCHLORIDE EXTENDED RELEASE 20 MG/1
20 TABLET ORAL 3 TIMES DAILY PRN
Qty: 90 TABLET | Refills: 0 | Status: SHIPPED | OUTPATIENT
Start: 2021-03-14 | End: 2021-04-12

## 2021-03-12 NOTE — TELEPHONE ENCOUNTER
Last seen: 10/7/20  RTC: 6 months  Cancel: None  No-show: None  Next appt: None     Medication requested: methylphenidate (METADATE ER) 20 MG CR tablet  Directions: Take 1 tablet (20 mg) by mouth 3 times daily as needed (attention) - Oral  Qty: 90  Last refilled: 2/15, 1/17, 12/19 per [place] .     Medication refill pended and routed to provider for authorization. Pt due for follow-up appointment in April.

## 2021-03-25 ENCOUNTER — VIRTUAL VISIT (OUTPATIENT)
Dept: PSYCHOLOGY | Facility: CLINIC | Age: 56
End: 2021-03-25
Payer: COMMERCIAL

## 2021-03-25 DIAGNOSIS — F33.42 MAJOR DEPRESSIVE DISORDER, RECURRENT EPISODE, IN FULL REMISSION (H): ICD-10-CM

## 2021-03-25 DIAGNOSIS — F43.10 POST TRAUMATIC STRESS DISORDER: Primary | ICD-10-CM

## 2021-03-25 PROCEDURE — 90834 PSYTX W PT 45 MINUTES: CPT | Mod: GT | Performed by: SOCIAL WORKER
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
## 2021-04-07 ENCOUNTER — VIRTUAL VISIT (OUTPATIENT)
Dept: PSYCHOLOGY | Facility: CLINIC | Age: 56
End: 2021-04-07
Payer: COMMERCIAL

## 2021-04-07 DIAGNOSIS — F43.10 POST TRAUMATIC STRESS DISORDER: Primary | ICD-10-CM

## 2021-04-07 DIAGNOSIS — F33.42 MAJOR DEPRESSIVE DISORDER, RECURRENT EPISODE, IN FULL REMISSION (H): ICD-10-CM

## 2021-04-07 PROCEDURE — 90834 PSYTX W PT 45 MINUTES: CPT | Mod: GT | Performed by: SOCIAL WORKER

## 2021-04-07 NOTE — PROGRESS NOTES
Progress Note - Visit    Client Name:  Jyothi Pierre Date: 4.7.21         Service Type: Individual     Visit Start Time: 9am  Visit End Time: 9:40 am    Time: 40 minutes    Visit #: 4    Attendees: Client attended alone    Service Modality:  Video Visit:      Provider verified identity through the following two step process.  Patient provided:  Patient photo    Telemedicine Visit: The patient's condition can be safely assessed and treated via synchronous audio and visual telemedicine encounter.      Reason for Telemedicine Visit: Patient has requested telehealth visit    Originating Site (Patient Location): Patient's home    Distant Site (Provider Location): [facility]    Consent:  The patient/guardian has verbally consented to: the potential risks and benefits of telemedicine (video visit) versus in person care; bill my insurance or make self-payment for services provided; and responsibility for payment of non-covered services.     Patient would like the video invitation sent by:  My Chart    Mode of Communication:  Video Conference via [email or web address]    As the provider I attest to compliance with applicable laws and regulations related to telemedicine.       DATA:   Interactive Complexity: No   Crisis: No     Presenting Concerns/  Current Stressors: Discussed pt's recent experience interacting with mother/father in-law while helping to support them in the process of a life transition. Discussed pt's experience of having internal/external reminders of past trauma. Helped pt process and make meaning of thoughts she was having during the process. Encouraged pt to practice self care/self compassion as she may notices her self feeling low on energy as a result of the amount of work/self care she engaged with over this past week.    Treatment Objective(s) Addressed in This Session:      Client will Identify cues and symptoms related to PTSD.           Intervention:   Grounding  skills for trauma; five things can hear, see, smell touch    ASSESSMENT:  Mental Status Assessment:  Appearance:   Appropriate   Eye Contact:   Good   Psychomotor Behavior: Normal   Attitude:   Cooperative   Orientation:   All  Speech   Rate / Production: Normal/ Responsive   Volume:  Normal   Mood:    Anxious   Affect:    Appropriate   Thought Content:  Clear   Thought Form:  Coherent   Insight:    Fair       Safety Issues and Plan for Safety and Risk Management:     [place] Suicide Severity Rating Scale (Lifetime/Recent)  [place] Suicide Severity Rating (Lifetime/Recent) 2/15/2021   1. Wish to be Dead (Lifetime) Yes   1. Wish to be Dead (Recent) No   Most Severe Ideation Rating (Lifetime) 3   Frequency (Lifetime) 3   Duration (Lifetime) 3   Controllability (Lifetime) 2   Protective Factors  (Lifetime) 2   Reasons for Ideation (Lifetime) 5   Most Severe Ideation Rating (Past Month) NA   Frequency (Past Month) NA   Duration (Past Month) NA   Controllability (Past Month) NA   Protective Factors (Past Month) NA   Reasons for Ideation (Past Month) NA   Actual Attempt (Lifetime) No     Patient denies current fears or concerns for personal safety.  Patient denies current or recent suicidal ideation or behaviors.  Patient denies current or recent homicidal ideation or behaviors.  Patient denies current or recent self injurious behavior or ideation.  Patient denies other safety concerns.  Recommended that patient call 911 or go to the local ED should there be a change in any of these risk factors.  Patient reports there are no firearms in the house.     Diagnostic Criteria:  CRITERIA (A-C) REPRESENT A MAJOR DEPRESSIVE EPISODE - SELECT THESE CRITERIA  A) Recurrent episode(s) - symptoms have been present during the same 2-week period and represent a change from previous functioning 5 or more symptoms (required for diagnosis)   - Depressed mood. Note: In children and adolescents, can be irritable mood.     - Diminished  interest or pleasure in all, or almost all, activities.    - Decreased sleep.    - Psychomotor activity agitation.    - Fatigue or loss of energy.    - Feelings of worthlessness or inappropriate and excessive guilt.    - Diminished ability to think or concentrate, or indecisiveness.   B) The symptoms cause clinically significant distress or impairment in social, occupational, or other important areas of functioning  C) The episode is not attributable to the physiological effects of a substance or to another medical condition  D) The occurence of major depressive episode is not better explained by other thought / psychotic disorders  E) There has never been a manic episode or hypomanic episode  A. The person has been exposed to a traumatic event in which both of the following were present:     (1) the person experienced, witnessed, or was confronted with an event or events that involved actual or threatened death or serious injury, or a threat to the physical integrity of self or others     (2) the person's response involved intense fear, helplessness, or horror. Note: In children, this may be expressed instead by disorganized or agitated behavior  B. The traumatic event is persistently reexperienced in one (or more) of the following ways:     - Recurrent and intrusive distressing recollections of the event, including images, thoughts, or perceptions. Note: In young children, repetitive play may occur in which themes or aspects of the trauma are expressed.      - Recurrent distressing dreams of the event. Note: In children, there may be frightening dreams without recognizable content.      - Intense psychological distress at exposure to internal or external cues that symbolize or resemble an aspect of the traumatic event.      - Physiological reactivity on exposure to internal or external cues that symbolize or resemble an aspect of the traumatic event.   C. Persistent avoidance of stimuli associated with the trauma  and numbing of general responsiveness (not present before the trauma), as indicated by three (or more) of the following:     - Efforts to avoid thoughts, feelings, or conversations associated with the trauma.      - Efforts to avoid activities, places, or people that arouse recollections of the trauma.      - Feeling of detachment or estrangement from others.   D. Persistent symptoms of increased arousal (not present before the trauma), as indicated by two (or more) of the following:     - Difficulty falling or staying asleep.      - Difficulty concentrating.      - Hypervigilance.      - Exaggerated startle response.   E. Duration of the disturbance is more than 1 month.  F. The disturbance causes clinically significant distress or impairment in social, occupational, or other important areas of functioning.      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  Collateral Reports Completed:  Not Applicable      PLAN: (Homework, other):  1. Provider will continue Diagnostic Assessment.  Patient was given the following to do until next session: Pt will practice self care/self compassion as she may notices her self feeling low on energy as a result of the amount of work/self care she engaged with over this past week.    3.  Safety plan created.  Provider recommended that patient  Call 911 if suicidal thoughts develop.       ALYSON Alejandro, SW  4.7.21  This note has been reviewed and I agree with the plan of care. This note is co-signed by ALYSON Gage, [facility], Supervisor, on: 4/14/21                                                      Treatment Plan    Client's Name: Jyothi Pierre  YOB: 1965    Date: 4.7.21    Diagnoses: 296.31 (F33.0) Major Depressive Disorder, Recurrent Episode, Mild With anxious distress 309.81 (F43.10) Posttraumatic Stress Disorder (includes Posttraumatic Stress Disorder for Children 6 Years and Younger)  With dissociative symptoms    Referral / Collaboration:  Referral to another professional/service is not indicated at this time.    Anticipated number of session or this episode of care: 13-15      MeasurableTreatment Goal(s) related to diagnosis / functional impairment(s)    Goal 2: Client will increase her understanding and comprehension of PTSD.     I will know I've met my goal when I am better able to tolerate a mix of emotions.       Objective #A (Client Action)                Status: New - Date: 4.7.21     Client will increasing her understanding of PTSD.     Intervention(s)  Therapist will provide educational materials on PTSD, including providing vocabulary to describe feelings associated with PTSD.     Objective #B  Client will Identify cues and symptoms related to PTSD.                    Status: New - Date: 4.7.21     Intervention(s)  Therapist will provide educational materials on PTSD cues and symptoms.  teach the client how to perform a behavioral chain analysis.     Patient has reviewed and agreed to the above plan.      Cesar Magaña, ALYSON, LGSW April 7, 2021

## 2021-04-07 NOTE — Clinical Note
Edd Pearl,   Can you please review? Thanks!    Kyler Tarsorrhaphy Text: A tarsorrhaphy was performed using Frost sutures.

## 2021-04-12 ENCOUNTER — MYC REFILL (OUTPATIENT)
Dept: PSYCHIATRY | Facility: CLINIC | Age: 56
End: 2021-04-12

## 2021-04-12 DIAGNOSIS — F90.9 ATTENTION DEFICIT HYPERACTIVITY DISORDER (ADHD), UNSPECIFIED ADHD TYPE: ICD-10-CM

## 2021-04-12 RX ORDER — METHYLPHENIDATE HYDROCHLORIDE EXTENDED RELEASE 20 MG/1
20 TABLET ORAL 3 TIMES DAILY PRN
Qty: 90 TABLET | Refills: 0 | Status: SHIPPED | OUTPATIENT
Start: 2021-04-12 | End: 2021-05-19

## 2021-04-12 NOTE — TELEPHONE ENCOUNTER
Last seen: 10/7  RTC: 6 months   Cancel: none   No-show: none   Next appt: none     Incoming refill from patient via [employer]     Medication requested: methylphenidate (METADATE ER) 20 MG CR tablet  Directions: Take 1 tablet (20 mg) by mouth 3 times daily as needed (attention) - Oral  Qty: 90  Last refilled: 3/15 #90, 2/15 #90, 1/15 #90     Will route to provider for approval

## 2021-04-28 DIAGNOSIS — F33.0 MAJOR DEPRESSIVE DISORDER, RECURRENT EPISODE, MILD (H): ICD-10-CM

## 2021-04-28 RX ORDER — ESCITALOPRAM OXALATE 20 MG/1
20 TABLET ORAL DAILY
Qty: 30 TABLET | Refills: 0 | Status: SHIPPED | OUTPATIENT
Start: 2021-04-28 | End: 2021-07-14

## 2021-04-28 NOTE — TELEPHONE ENCOUNTER
Medication requested: escitalopram (LEXAPRO) 20 MG tablet  Last refilled: 10/7/20  Qty: 30/5      Last seen: 10/7/20  RTC: 6 months  Cancel: 0  No-show: 0  Next appt: 0    Refill decision:   30 day mila refill sent to the pharmacy - including instructions for patient to call the clinic and schedule an appointment.

## 2021-05-05 ENCOUNTER — VIRTUAL VISIT (OUTPATIENT)
Dept: PSYCHOLOGY | Facility: CLINIC | Age: 56
End: 2021-05-05
Payer: COMMERCIAL

## 2021-05-05 DIAGNOSIS — F43.10 POST TRAUMATIC STRESS DISORDER: Primary | ICD-10-CM

## 2021-05-05 PROCEDURE — 90834 PSYTX W PT 45 MINUTES: CPT | Mod: GT | Performed by: SOCIAL WORKER

## 2021-05-05 ASSESSMENT — COLUMBIA-SUICIDE SEVERITY RATING SCALE - C-SSRS
REASONS FOR IDEATION PAST MONTH: COMPLETELY TO END OR STOP THE PAIN (YOU COULDN'T GO ON LIVING WITH THE PAIN OR HOW YOU WERE FEELING)
1. IN THE PAST MONTH, HAVE YOU WISHED YOU WERE DEAD OR WISHED YOU COULD GO TO SLEEP AND NOT WAKE UP?: YES
ATTEMPT LIFETIME: YES
1. IN THE PAST MONTH, HAVE YOU WISHED YOU WERE DEAD OR WISHED YOU COULD GO TO SLEEP AND NOT WAKE UP?: YES

## 2021-05-05 NOTE — PROGRESS NOTES
Progress Note - Visit    Client Name:  Jyothi Pierre Date: 4.7.21         Service Type: Individual     Visit Start Time: 9am  Visit End Time: 9:50 am    Time: 50 minutes    Visit #: 4    Attendees: Client attended alone    Service Modality:  Video Visit:      Provider verified identity through the following two step process.  Patient provided:  Patient photo    Telemedicine Visit: The patient's condition can be safely assessed and treated via synchronous audio and visual telemedicine encounter.      Reason for Telemedicine Visit: Patient has requested telehealth visit    Originating Site (Patient Location): Patient's home    Distant Site (Provider Location): [facility]    Consent:  The patient/guardian has verbally consented to: the potential risks and benefits of telemedicine (video visit) versus in person care; bill my insurance or make self-payment for services provided; and responsibility for payment of non-covered services.     Patient would like the video invitation sent by:  My Chart    Mode of Communication:  Video Conference via [employer]    As the provider I attest to compliance with applicable laws and regulations related to telemedicine.       DATA:   Interactive Complexity: No   Crisis: No     Presenting Concerns/  Current Stressors: Pt reflected on this time of year being very emotionally difficult as her sister passed away this year. Discussed emotional pain of mother describing to pt that pt had failed her sister in many ways. Discussed how this combined with pt's difficulty with hearing loss in her present life at school is making her think and feel like she is inadequate. Discussed how she has observed/noticed herself imagining what it would be like if she weren't alive. She described noticing that she had given an item away recently. She reports she became aware that this was a cue for it becoming more likely she might impulsively take steps toward  "enacting suicide. Pt reports that she has not thought about ways to kill or harm herself. For remainder of visit, this writer helped pt develop a safety plan. Encouraged pt to reach out to this writer if she continues to notice warning signs. 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    Treatment Objective(s) Addressed in This Session:      Assessing for safety      Intervention:   Safety planning                                               Jyothi Pierre     SAFETY PLAN:  Step 1: Warning signs / cues (Thoughts, images, mood, situation, behavior) that a crisis may be developing:    Thoughts: \"I don't matter\", \"People would be better off without me\" and \"I'm a burden\"    Images: thinking about how mother said I failed my sister    Thinking Processes: racing thoughts and highly critical and negative thoughts: I'm not of value to others    Mood: worsening depression    Behaviors: giving items away    Situations: others making fun of hearing loss   Step 2: Coping strategies - Things I can do to take my mind off of my problems without contacting another person (relaxation technique, physical activity):    Distress Tolerance Strategies:  paced breathing/progressive muscle relaxation and grounding skills    Physical Activities: go for a walk    Focus on helpful thoughts:  self-compassion statements: I am creating new neruopathways, suicidal thoughts is an older pathway, I will commit to making new ones  Step 3: People and social settings that provide distraction:   Name:      nature   Step 4: Remind myself of people and things that are important to me and worth living for:  I want to model for my kids, 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 Daytime Number: [phone]    Suicide Prevention Lifeline: 4-431-386-TDHB (9222)    Crisis Text Line Service (available 24 hours a day, 7 days a week): Text [place] to 595655  Local Crisis Services:     Call 911 or go to my nearest emergency department.   I helped develop this safety plan and agree to use it when needed.  I have been given a copy of this plan.      Client signature _________________________________________________________________  Today s date:  5/5/2021  Adapted from Safety Plan Template 2008 Lori Mercado and Juan Luis Vela is reprinted with the express permission of the authors.  No portion of the Safety Plan Template may be reproduced without the express, written permission.  You can contact the authors at [email or web address] or [email or web address].      ASSESSMENT:  Mental Status Assessment:  Appearance:   Appropriate   Eye Contact:   Good   Psychomotor Behavior: Normal   Attitude:   Cooperative   Orientation:   All  Speech   Rate / Production: Normal/ Responsive   Volume:  Normal   Mood:    Anxious   Affect:    Appropriate   Thought Content:  Clear   Thought Form:  Coherent   Insight:    Fair       Safety Issues and Plan for Safety and Risk Management:     Kane Suicide Severity Rating Scale (Lifetime/Recent)  Kane Suicide Severity Rating (Lifetime/Recent) 2/15/2021 5/5/2021   1. Wish to be Dead (Lifetime) Yes Yes   1. Wish to be Dead (Recent) No Yes   Wish to be Dead Description (Recent) - Thought of not wanting to be alive   Most Severe Ideation Rating (Lifetime) 3 -   Frequency (Lifetime) 3 -   Duration (Lifetime) 3 -   Controllability (Lifetime) 2 -   Protective Factors  (Lifetime) 2 -   Reasons for Ideation (Lifetime) 5 -   Most Severe Ideation Rating (Past Month) NA 2   Frequency (Past Month) NA 2   Duration (Past Month) NA 2    Controllability (Past Month) NA 1   Protective Factors (Past Month) NA 1   Reasons for Ideation (Past Month) NA 5   Actual Attempt (Lifetime) No Yes   Actual Attempt Description (Lifetime) - Overdose on medications 5 yeas ago     Patient denies current fears or concerns for personal safety.  Patient denies current or recent suicidal ideation or behaviors.  Patient denies current or recent homicidal ideation or behaviors.  Patient denies current or recent self injurious behavior or ideation.  Patient denies other safety concerns.  Recommended that patient call 911 or go to the local ED should there be a change in any of these risk factors.  Patient reports there are no firearms in the house.     Diagnostic Criteria:  CRITERIA (A-C) REPRESENT A MAJOR DEPRESSIVE EPISODE - SELECT THESE CRITERIA  A) Recurrent episode(s) - symptoms have been present during the same 2-week period and represent a change from previous functioning 5 or more symptoms (required for diagnosis)   - Depressed mood. Note: In children and adolescents, can be irritable mood.     - Diminished interest or pleasure in all, or almost all, activities.    - Decreased sleep.    - Psychomotor activity agitation.    - Fatigue or loss of energy.    - Feelings of worthlessness or inappropriate and excessive guilt.    - Diminished ability to think or concentrate, or indecisiveness.   B) The symptoms cause clinically significant distress or impairment in social, occupational, or other important areas of functioning  C) The episode is not attributable to the physiological effects of a substance or to another medical condition  D) The occurence of major depressive episode is not better explained by other thought / psychotic disorders  E) There has never been a manic episode or hypomanic episode  A. The person has been exposed to a traumatic event in which both of the following were present:     (1) the person experienced, witnessed, or was confronted with an  event or events that involved actual or threatened death or serious injury, or a threat to the physical integrity of self or others     (2) the person's response involved intense fear, helplessness, or horror. Note: In children, this may be expressed instead by disorganized or agitated behavior  B. The traumatic event is persistently reexperienced in one (or more) of the following ways:     - Recurrent and intrusive distressing recollections of the event, including images, thoughts, or perceptions. Note: In young children, repetitive play may occur in which themes or aspects of the trauma are expressed.      - Recurrent distressing dreams of the event. Note: In children, there may be frightening dreams without recognizable content.      - Intense psychological distress at exposure to internal or external cues that symbolize or resemble an aspect of the traumatic event.      - Physiological reactivity on exposure to internal or external cues that symbolize or resemble an aspect of the traumatic event.   C. Persistent avoidance of stimuli associated with the trauma and numbing of general responsiveness (not present before the trauma), as indicated by three (or more) of the following:     - Efforts to avoid thoughts, feelings, or conversations associated with the trauma.      - Efforts to avoid activities, places, or people that arouse recollections of the trauma.      - Feeling of detachment or estrangement from others.   D. Persistent symptoms of increased arousal (not present before the trauma), as indicated by two (or more) of the following:     - Difficulty falling or staying asleep.      - Difficulty concentrating.      - Hypervigilance.      - Exaggerated startle response.   E. Duration of the disturbance is more than 1 month.  F. The disturbance causes clinically significant distress or impairment in social, occupational, or other important areas of functioning.      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  Collateral Reports Completed:  Not Applicable      PLAN: (Homework, other): Safety plan created.  Provider recommended that patient  Call 911 if suicidal thoughts develop.       ALYSON Alejandro, [facility]  5.5.21  This note has been reviewed and I agree with the plan of care. This note is co-signed by ALYSON Gage, [facility], Supervisor, on:5/10/21                                                     Treatment Plan    Client's Name: Jyothi Pierre  YOB: 1965    Date: 4.7.21    Diagnoses: 296.31 (F33.0) Major Depressive Disorder, Recurrent Episode, Mild With anxious distress 309.81 (F43.10) Posttraumatic Stress Disorder (includes Posttraumatic Stress Disorder for Children 6 Years and Younger)  With dissociative symptoms    Referral / Collaboration:  Referral to another professional/service is not indicated at this time.    Anticipated number of session or this episode of care: 13-15      MeasurableTreatment Goal(s) related to diagnosis / functional impairment(s)    Goal 2: Client will increase her understanding and comprehension of PTSD.     I will know I've met my goal when I am better able to tolerate a mix of emotions.       Objective #A (Client Action)                Status: New - Date: 4.7.21     Client will increasing her understanding of PTSD.     Intervention(s)  Therapist will provide educational materials on PTSD, including providing vocabulary to describe feelings associated with PTSD.     Objective #B  Client will Identify cues and symptoms related to PTSD.                    Status: New - Date:  4.7.21     Intervention(s)  Therapist will provide educational materials on PTSD cues and symptoms.  teach the client how to perform a behavioral chain analysis.     Patient has reviewed and agreed to the above plan.      ALYSON Alejandro, LGSW April 7, 2021

## 2021-05-18 ENCOUNTER — MYC REFILL (OUTPATIENT)
Dept: PSYCHIATRY | Facility: CLINIC | Age: 56
End: 2021-05-18

## 2021-05-18 DIAGNOSIS — F90.9 ATTENTION DEFICIT HYPERACTIVITY DISORDER (ADHD), UNSPECIFIED ADHD TYPE: ICD-10-CM

## 2021-05-18 RX ORDER — METHYLPHENIDATE HYDROCHLORIDE EXTENDED RELEASE 20 MG/1
20 TABLET ORAL 3 TIMES DAILY PRN
Qty: 90 TABLET | Refills: 0 | Status: CANCELLED | OUTPATIENT
Start: 2021-05-18

## 2021-05-19 DIAGNOSIS — F90.9 ATTENTION DEFICIT HYPERACTIVITY DISORDER (ADHD), UNSPECIFIED ADHD TYPE: ICD-10-CM

## 2021-05-19 RX ORDER — METHYLPHENIDATE HYDROCHLORIDE EXTENDED RELEASE 20 MG/1
20 TABLET ORAL 3 TIMES DAILY PRN
Qty: 90 TABLET | Refills: 0 | Status: SHIPPED | OUTPATIENT
Start: 2021-05-19 | End: 2021-06-10

## 2021-05-19 NOTE — TELEPHONE ENCOUNTER
Last seen: 10/7  RTC: 6 months   Cancel: none   No-show: none   Next appt: none     Incoming refill from patient via [employer]     Medication requested: methylphenidate (METADATE ER) 20 MG CR tablet  Directions: Take 1 tablet (20 mg) by mouth 3 times daily as needed (attention) - Oral  Qty: 90   Last refilled: 4/17 #90, 3/15 #90, 2/15 #90    Will route to provider for approval

## 2021-05-20 NOTE — TELEPHONE ENCOUNTER
- Meds refilled by provider   - Med tab changed to reflect this   - Patient notified via "[employer]t

## 2021-06-10 DIAGNOSIS — F90.9 ATTENTION DEFICIT HYPERACTIVITY DISORDER (ADHD), UNSPECIFIED ADHD TYPE: ICD-10-CM

## 2021-06-10 RX ORDER — METHYLPHENIDATE HYDROCHLORIDE EXTENDED RELEASE 20 MG/1
TABLET ORAL
Qty: 90 TABLET | Refills: 0 | Status: CANCELLED | OUTPATIENT
Start: 2021-06-16

## 2021-06-10 RX ORDER — METHYLPHENIDATE HYDROCHLORIDE EXTENDED RELEASE 20 MG/1
20 TABLET ORAL 3 TIMES DAILY PRN
Qty: 90 TABLET | Refills: 0 | Status: SHIPPED | OUTPATIENT
Start: 2021-06-10 | End: 2021-07-14

## 2021-06-10 NOTE — TELEPHONE ENCOUNTER
[facility] pt  Received: Today  Message Contents   Gabby Aguero [facility]iatry [facility]   Phone Number: [phone]             Caller:  Self   Relationship to pt: Self   Medication:   methylphenidate (METADATE ER) 20 MG CR tablet   methylphenidate (METADATE ER) 20 MG CR tablet   How many days left of med do you have left (if >3 day supply, redirect to pharmacy): until 06/19   Pharmacy and location: [id number] [place] [address] [address] [facility] [address]   Pending appt date:  8/4 @ 8am   Okay to leave detailed VM:  yes, [phone]     ** Izzy on 6/15 - 6/21 reason for why pt wants meds to refill earlier - leaving early and flying out on the 15th. Pt wondering if she can  on the 14th. Pt requested call back regarding early  and if that's possible [phone]

## 2021-06-10 NOTE — TELEPHONE ENCOUNTER
Last seen: 10/7  RTC: 6 months   Cancel: none   No-show: none   Next appt: 8/4    Incoming refill from [facility] via phone     Medication requested:  methylphenidate (METADATE ER) 20 MG CR tablet  Directions: Take 1 tablet (20 mg) by mouth 3 times daily as needed (attention) Need to schedule visit for further refills - Oral  Qty: 90  Last refilled: 5/19 #90, 4/17 #90, 3/15 #90    - Will route to provider for approval.   - Also patient is requesting a refill 3 days early due to going out of town on 6/15.

## 2021-07-14 ENCOUNTER — MYC REFILL (OUTPATIENT)
Dept: PSYCHIATRY | Facility: CLINIC | Age: 56
End: 2021-07-14

## 2021-07-14 DIAGNOSIS — F33.0 MAJOR DEPRESSIVE DISORDER, RECURRENT EPISODE, MILD (H): ICD-10-CM

## 2021-07-14 DIAGNOSIS — F90.9 ATTENTION DEFICIT HYPERACTIVITY DISORDER (ADHD), UNSPECIFIED ADHD TYPE: ICD-10-CM

## 2021-07-14 RX ORDER — ESCITALOPRAM OXALATE 20 MG/1
20 TABLET ORAL DAILY
Qty: 30 TABLET | Refills: 0 | Status: SHIPPED | OUTPATIENT
Start: 2021-07-14 | End: 2021-08-04

## 2021-07-14 RX ORDER — METHYLPHENIDATE HYDROCHLORIDE EXTENDED RELEASE 20 MG/1
20 TABLET ORAL 3 TIMES DAILY PRN
Qty: 90 TABLET | Refills: 0 | Status: SHIPPED | OUTPATIENT
Start: 2021-07-14 | End: 2021-08-04

## 2021-07-14 NOTE — TELEPHONE ENCOUNTER
Last seen: 10/7  RTC: 6 months   Cancel: none   No-show: none   Next appt: 8/4    Incoming refill from patient via [employer]      Disp Refills Start End MARÍA   escitalopram (LEXAPRO) 20 MG tablet 30 tablet 0 4/28/2021  No   Sig - Route: Take 1 tablet (20 mg) by mouth daily Please call [phone] to schedule follow up appointment - Oral   Sent to pharmacy as: Escitalopram Oxalate 20 MG Oral Tablet (LEXAPRO)   Class: E-Prescribe   Order: [id number]   E-Prescribing Status: Receipt confirmed by pharmacy (4/28/2021  1:11 PM CDT)      Disp Refills Start End MARÍA   methylphenidate (METADATE ER) 20 MG CR tablet 90 tablet 0 6/10/2021  No   Sig - Route: Take 1 tablet (20 mg) by mouth 3 times daily as needed (attention) - Oral   Sent to pharmacy as: Methylphenidate HCl ER 20 MG Oral Tablet Extended Release (METADATE ER)   Class: E-Prescribe   Earliest Fill Date: 6/10/2021   Notes to Pharmacy: Please fill on or after June 16, 2021   Order: [id number]   E-Prescribing Status: Receipt confirmed by pharmacy (6/10/2021  5:08 PM CDT)   Per  last refilled: 6/16 #90, 5/19 #90, 4/17 #90    Per chart review, patient should have ran out of Lexapro 1.5 months ago. Will reach out to patient for further clarification.

## 2021-07-15 NOTE — TELEPHONE ENCOUNTER
- Meds refilled by provider  - Med tab changed to reflect this   - Patient notified via [employer]

## 2021-08-04 ENCOUNTER — VIRTUAL VISIT (OUTPATIENT)
Dept: PSYCHIATRY | Facility: CLINIC | Age: 56
End: 2021-08-04
Attending: NURSE PRACTITIONER
Payer: COMMERCIAL

## 2021-08-04 ENCOUNTER — TELEPHONE (OUTPATIENT)
Dept: PSYCHIATRY | Facility: CLINIC | Age: 56
End: 2021-08-04

## 2021-08-04 DIAGNOSIS — F33.0 MAJOR DEPRESSIVE DISORDER, RECURRENT EPISODE, MILD (H): ICD-10-CM

## 2021-08-04 DIAGNOSIS — F90.9 ATTENTION DEFICIT HYPERACTIVITY DISORDER (ADHD), UNSPECIFIED ADHD TYPE: ICD-10-CM

## 2021-08-04 PROCEDURE — 99214 OFFICE O/P EST MOD 30 MIN: CPT | Mod: TEL | Performed by: NURSE PRACTITIONER

## 2021-08-04 RX ORDER — ESCITALOPRAM OXALATE 20 MG/1
20 TABLET ORAL DAILY
Qty: 90 TABLET | Refills: 0 | Status: SHIPPED | OUTPATIENT
Start: 2021-08-04 | End: 2021-11-03

## 2021-08-04 RX ORDER — METHYLPHENIDATE HYDROCHLORIDE EXTENDED RELEASE 20 MG/1
20 TABLET ORAL 3 TIMES DAILY PRN
Qty: 90 TABLET | Refills: 0 | Status: SHIPPED | OUTPATIENT
Start: 2021-08-04 | End: 2021-08-04

## 2021-08-04 RX ORDER — METHYLPHENIDATE HYDROCHLORIDE EXTENDED RELEASE 20 MG/1
TABLET ORAL
Qty: 90 TABLET | Refills: 0 | Status: SHIPPED | OUTPATIENT
Start: 2021-08-04 | End: 2021-11-03

## 2021-08-04 RX ORDER — METHYLPHENIDATE HYDROCHLORIDE EXTENDED RELEASE 20 MG/1
20 TABLET ORAL 3 TIMES DAILY PRN
Qty: 90 TABLET | Refills: 0 | Status: SHIPPED | OUTPATIENT
Start: 2021-08-04 | End: 2021-10-15

## 2021-08-04 NOTE — TELEPHONE ENCOUNTER
On August 4, 2021, at 7:35 AM, writer called patient at [phone] to confirm Virtual Visit. Writer unable to make contact with patient. Writer left detailed voice message for call back. [phone] left as call back number. Saknia Fuller, [facility]
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
## 2021-09-16 ENCOUNTER — MYC REFILL (OUTPATIENT)
Dept: PSYCHIATRY | Facility: CLINIC | Age: 56
End: 2021-09-16

## 2021-09-16 DIAGNOSIS — F90.9 ATTENTION DEFICIT HYPERACTIVITY DISORDER (ADHD), UNSPECIFIED ADHD TYPE: ICD-10-CM

## 2021-09-16 RX ORDER — METHYLPHENIDATE HYDROCHLORIDE EXTENDED RELEASE 20 MG/1
TABLET ORAL
Qty: 90 TABLET | Refills: 0 | Status: CANCELLED | OUTPATIENT
Start: 2021-09-16

## 2021-09-16 NOTE — TELEPHONE ENCOUNTER
Last seen: 8/4  RTC: 3 months   Cancel: none   No-show: none   Next appt: 11/3    Incoming refill from patient via TURN8      Disp Refills Start End MARÍA   methylphenidate (METADATE ER) 20 MG CR tablet 90 tablet 0 8/4/2021  No   Sig - Route: Take 1 tablet (20 mg) by mouth 3 times daily as needed (attention) - Oral   Sent to pharmacy as: Methylphenidate HCl ER 20 MG Oral Tablet Extended Release (METADATE ER)   Class: E-Prescribe   Earliest Fill Date: 8/4/2021   Notes to Pharmacy: Fill Oct 7, 2021   Order: [id number]   E-Prescribing Status: Receipt confirmed by pharmacy (8/4/2021  8:38 AM CDT   Per  last refilled: 8/17 #90, 7/14 #90, 6/16 #90    Per chart review, patient should have one refill on file. Placed a call to [id number] - [place] - [address] AT [facility] [address] and confirmed refills on file. Patient notified via TURN8.

## 2021-10-15 ENCOUNTER — MYC REFILL (OUTPATIENT)
Dept: PSYCHIATRY | Facility: CLINIC | Age: 56
End: 2021-10-15

## 2021-10-15 DIAGNOSIS — F90.9 ATTENTION DEFICIT HYPERACTIVITY DISORDER (ADHD), UNSPECIFIED ADHD TYPE: ICD-10-CM

## 2021-10-15 RX ORDER — METHYLPHENIDATE HYDROCHLORIDE EXTENDED RELEASE 20 MG/1
20 TABLET ORAL 3 TIMES DAILY PRN
Qty: 90 TABLET | Refills: 0 | Status: SHIPPED | OUTPATIENT
Start: 2021-10-15 | End: 2021-11-03

## 2021-10-15 NOTE — TELEPHONE ENCOUNTER
- Meds refilled by provider   - Med tab changed to reflect this   - Patient notified via Viveraet

## 2021-10-15 NOTE — TELEPHONE ENCOUNTER
Last seen: 8/4  RTC: 3 months   Cancel: none   No-show: none   Next appt: 11/3    Incoming refill from patient via JobSlott     Medication requested: methylphenidate (METADATE ER) 20 MG CR tablet  Directions: Take one tab three times daily as needed for attention  Qty: 90  Last refilled: 9/16 #90, 8/17 #90, 7/14 #90    Will route to provider for approval

## 2021-10-23 ENCOUNTER — HEALTH MAINTENANCE LETTER (OUTPATIENT)
Age: 56
End: 2021-10-23

## 2021-10-31 DIAGNOSIS — F33.0 MAJOR DEPRESSIVE DISORDER, RECURRENT EPISODE, MILD (H): ICD-10-CM

## 2021-11-03 ENCOUNTER — VIRTUAL VISIT (OUTPATIENT)
Dept: PSYCHIATRY | Facility: CLINIC | Age: 56
End: 2021-11-03
Attending: NURSE PRACTITIONER
Payer: COMMERCIAL

## 2021-11-03 DIAGNOSIS — F90.9 ATTENTION DEFICIT HYPERACTIVITY DISORDER (ADHD), UNSPECIFIED ADHD TYPE: ICD-10-CM

## 2021-11-03 DIAGNOSIS — F33.0 MAJOR DEPRESSIVE DISORDER, RECURRENT EPISODE, MILD (H): ICD-10-CM

## 2021-11-03 PROCEDURE — 99214 OFFICE O/P EST MOD 30 MIN: CPT | Mod: GT | Performed by: NURSE PRACTITIONER

## 2021-11-03 RX ORDER — METHYLPHENIDATE HYDROCHLORIDE EXTENDED RELEASE 20 MG/1
20 TABLET ORAL 3 TIMES DAILY PRN
Qty: 90 TABLET | Refills: 0 | Status: SHIPPED | OUTPATIENT
Start: 2021-11-03 | End: 2022-03-18

## 2021-11-03 RX ORDER — METHYLPHENIDATE HYDROCHLORIDE EXTENDED RELEASE 20 MG/1
TABLET ORAL
Qty: 90 TABLET | Refills: 0 | Status: SHIPPED | OUTPATIENT
Start: 2021-11-03 | End: 2022-01-12

## 2021-11-03 RX ORDER — ESCITALOPRAM OXALATE 20 MG/1
20 TABLET ORAL DAILY
Qty: 90 TABLET | Refills: 0 | Status: SHIPPED | OUTPATIENT
Start: 2021-11-03 | End: 2022-01-07

## 2021-11-03 RX ORDER — METHYLPHENIDATE HYDROCHLORIDE 20 MG/1
20 CAPSULE, EXTENDED RELEASE ORAL EVERY MORNING
Qty: 90 CAPSULE | Refills: 0 | Status: SHIPPED | OUTPATIENT
Start: 2021-11-03 | End: 2022-01-07

## 2021-11-03 RX ORDER — ESCITALOPRAM OXALATE 20 MG/1
TABLET ORAL
Qty: 90 TABLET | Refills: 0 | OUTPATIENT
Start: 2021-11-03

## 2021-11-03 ASSESSMENT — PAIN SCALES - GENERAL: PAINLEVEL: NO PAIN (0)
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
## 2021-11-03 NOTE — PATIENT INSTRUCTIONS
**For crisis resources, please see the information at the end of this document**     Patient Education      Thank you for coming to the [facility] [place] [facility].    Lab Testing:  If you had lab testing today and your results are reassuring or normal they will be mailed to you or sent through [employer] within 7 days. If the lab tests need quick action we will call you with the results. The phone number we will call with results is # [phone] (home) . If this is not the best number please call our clinic and change the number.    Medication Refills:  If you need any refills please call your pharmacy and they will contact us. Our fax number for refills is [phone]. Please allow three business for refill processing. If you need to  your refill at a new pharmacy, please contact the new pharmacy directly. The new pharmacy will help you get your medications transferred.     Scheduling:  If you have any concerns about today's visit or wish to schedule another appointment please call our office during normal business hours [phone] (8-5:00 M-F)    Contact Us:  Please call [phone] during business hours (8-5:00 M-F).  If after clinic hours, or on the weekend, please call  [phone].    Financial Assistance [phone]  [facility] Billing [phone]  Central Billing Office, [facility]: [phone]  [facility] Billing [phone]  Medical Records [phone]  [facility] Patient Bill of Rights [email or web address]       MENTAL HEALTH CRISIS NUMBERS:  For a medical emergency please call  911 or go to the nearest ER.     [facility]:   [facility] -[phone]   Crisis Residence [facility] -[phone]   Walk-In Counseling [facility] -[phone]   COPE 24/7 [place] Mobile Team -[phone] (adults)/[phone] (child)  CHILD: [facility] needs assessment  team - [phone]      [facility]:   [facility] - [phone]   Walk-in counseling [facility] - [phone]   Walk-in counseling [facility] - [phone]   Crisis Residence [facility] - [phone]  Urgent Care Adult Mental Obwnfq-[phone] mobile unit/ 24/7 crisis line    National Crisis Numbers:   National Suicide Prevention Lifeline: 5-712-179-TALK (801-466-5500)  Poison Control Center - 4-850-289-7289  Surgical Care Affiliates/resources for a list of additional resources (SOS)  Trans Lifeline a hotline for transgender people 1-300.212.8850  The Dario Project a hotline for LGBT youth 8-891-291-9184  Crisis Text Line: For any crisis 24/7   To: 234860  see www.crisistextline.org  - IF MAKING A CALL FEELS TOO HARD, send a text!         Again thank you for choosing [facility] and please let us know how we can best partner with you to improve you and your family's health.    You may be receiving a survey regarding this appointment. We would love to have your feedback, both positive and negative. The survey is done by an external company, so your answers are anonymous.

## 2022-01-07 ENCOUNTER — VIRTUAL VISIT (OUTPATIENT)
Dept: PSYCHIATRY | Facility: CLINIC | Age: 57
End: 2022-01-07
Attending: NURSE PRACTITIONER
Payer: COMMERCIAL

## 2022-01-07 DIAGNOSIS — F90.9 ATTENTION DEFICIT HYPERACTIVITY DISORDER (ADHD), UNSPECIFIED ADHD TYPE: ICD-10-CM

## 2022-01-07 DIAGNOSIS — F33.0 MAJOR DEPRESSIVE DISORDER, RECURRENT EPISODE, MILD (H): ICD-10-CM

## 2022-01-07 PROCEDURE — 99214 OFFICE O/P EST MOD 30 MIN: CPT | Mod: GT | Performed by: NURSE PRACTITIONER

## 2022-01-07 RX ORDER — METHYLPHENIDATE HYDROCHLORIDE 20 MG/1
20 CAPSULE, EXTENDED RELEASE ORAL EVERY MORNING
Qty: 90 CAPSULE | Refills: 0 | Status: SHIPPED | OUTPATIENT
Start: 2022-01-07 | End: 2022-01-12

## 2022-01-07 RX ORDER — ESCITALOPRAM OXALATE 20 MG/1
20 TABLET ORAL DAILY
Qty: 90 TABLET | Refills: 0 | Status: SHIPPED | OUTPATIENT
Start: 2022-01-07 | End: 2022-03-18

## 2022-01-07 ASSESSMENT — PAIN SCALES - GENERAL: PAINLEVEL: NO PAIN (0)
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
## 2022-01-07 NOTE — PATIENT INSTRUCTIONS
**For crisis resources, please see the information at the end of this document**   Patient Education    Thank you for coming to the [facility]ENT[facility] HEALTH & ADDI[facility] [place] CLINIC.    Lab Testing:  If you had lab testing today and your results are reassuring or normal they will be mailed to you or sent through [employer] within 7 days. If the lab tests need quick action we will call you with the results. The phone number we will call with results is # [phone] (home) . If this is not the best number please call our clinic and change the number.    Medication Refills:  If you need any refills please call your pharmacy and they will contact us. Our fax number for refills is [phone]. Please allow three business for refill processing. If you need to  your refill at a new pharmacy, please contact the new pharmacy directly. The new pharmacy will help you get your medications transferred.     Scheduling:  If you have any concerns about today's visit or wish to schedule another appointment please call our office during normal business hours [phone] (8-5:00 M-F)    Contact Us:  Please call [phone] during business hours (8-5:00 M-F).  If after clinic hours, or on the weekend, please call  [phone].    Financial Assistance [phone]  [employer] Billing [phone]  Central Billing Office, [facility]: [phone]  [place] Billing [phone]  Medical Records [phone]  [place] Patient Bill of Rights [email or web address]       MENTAL HEALTH CRISIS NUMBERS:  For a medical emergency please call  911 or go to the nearest ER.     [facility]:   [facility] -[phone]   Crisis Residence [facility] -[phone]   Walk-In Counseling Center [facility] -[phone]   COPE 24/7 [place] Mobile Team -[phone] (adults)/[phone] (child)  CHILD: [facility] needs assessment team -  [phone]      [facility]:   [facility] - [phone]   Walk-in counseling [facility] - [phone]   Walk-in counseling [facility] - [phone]   Crisis Residence [facility] - [phone]  Urgent Care Adult Mental Dgqnjm-[phone] mobile unit/ 24/7 crisis line    National Crisis Numbers:   National Suicide Prevention Lifeline: 3-895-795-TALK (438-901-7097)  Poison Control Center - 5-854-090-1047  Physicians Interactive/resources for a list of additional resources (SOS)  Trans Lifeline a hotline for transgender people 4-109-570-5968  The Dario Project a hotline for LGBT youth 0-703-529-0932  Crisis Text Line: For any crisis 24/7   To: 608318  see www.crisistextline.org  - IF MAKING A CALL FEELS TOO HARD, send a text!         Again thank you for choosing [facility] MENTAL HEALTH & ADDICTION [facility] and please let us know how we can best partner with you to improve you and your family's health.    You may be receiving a survey regarding this appointment. We would love to have your feedback, both positive and negative. The survey is done by an external company, so your answers are anonymous.

## 2022-01-11 ENCOUNTER — MYC MEDICAL ADVICE (OUTPATIENT)
Dept: PSYCHIATRY | Facility: CLINIC | Age: 57
End: 2022-01-11
Payer: COMMERCIAL

## 2022-01-11 DIAGNOSIS — F90.9 ATTENTION DEFICIT HYPERACTIVITY DISORDER (ADHD), UNSPECIFIED ADHD TYPE: ICD-10-CM

## 2022-01-11 RX ORDER — METHYLPHENIDATE HYDROCHLORIDE 20 MG/1
20 CAPSULE, EXTENDED RELEASE ORAL 3 TIMES DAILY PRN
Qty: 90 CAPSULE | Refills: 0 | Status: CANCELLED | OUTPATIENT
Start: 2022-01-11

## 2022-01-11 NOTE — TELEPHONE ENCOUNTER
Per med tab:      Disp Refills Start End MARÍA   methylphenidate (METADATE CD) 20 MG CR capsule 90 capsule 0 1/7/2022  No   Sig - Route: Take 1 capsule (20 mg) by mouth every morning - Oral   Sent to pharmacy as: Methylphenidate HCl ER (CD) 20 MG Oral Capsule Extended Release (METADATE CD)   Class: E-Prescribe   Earliest Fill Date: 1/7/2022   Notes to Pharmacy: Fill 1/07/2022   Order: [id number]   E-Prescribing Status: Receipt confirmed by pharmacy (1/7/2022  4:05 PM CST)     Per last office visit:      1) seeking GIOVANNY for [facility] for vitals in the last year, will fill Metadate ER 20mg TID PRN for one month, Lexapro 20mg daily  - she'll request vitals at audiology appts  - pain management writing gabapentin  - monitoring vitals    Will route to provider to confirm the Metadate dose patient should be taking

## 2022-01-12 DIAGNOSIS — F90.9 ATTENTION DEFICIT HYPERACTIVITY DISORDER (ADHD), UNSPECIFIED ADHD TYPE: ICD-10-CM

## 2022-01-12 RX ORDER — METHYLPHENIDATE HYDROCHLORIDE EXTENDED RELEASE 20 MG/1
TABLET ORAL
Qty: 90 TABLET | Refills: 0 | Status: SHIPPED | OUTPATIENT
Start: 2022-01-12 | End: 2022-02-10

## 2022-01-12 NOTE — TELEPHONE ENCOUNTER
- Metadate 20 mg TID e-prescribed by provider   - Med tab changed to reflect this   - Patient notified via Audit Verify     Placed a call to [id number] - [place], [place] - [address] [address] and spoke with pharmacist, who confirmed Metadate 20 mg TID dose went through patient's insurance. They also discontinued previous script sent on 1/7.

## 2022-02-10 ENCOUNTER — MYC REFILL (OUTPATIENT)
Dept: PSYCHIATRY | Facility: CLINIC | Age: 57
End: 2022-02-10
Payer: COMMERCIAL

## 2022-02-10 DIAGNOSIS — F90.9 ATTENTION DEFICIT HYPERACTIVITY DISORDER (ADHD), UNSPECIFIED ADHD TYPE: ICD-10-CM

## 2022-02-11 RX ORDER — METHYLPHENIDATE HYDROCHLORIDE EXTENDED RELEASE 20 MG/1
TABLET ORAL
Qty: 90 TABLET | Refills: 0 | Status: SHIPPED | OUTPATIENT
Start: 2022-02-11 | End: 2022-03-18

## 2022-02-11 NOTE — TELEPHONE ENCOUNTER
Last seen: 1/7  RTC: 2-3 months, sooner as needed  Cancel: none  No-show: none  Next appt: 3/18      Disp Refills Start End MARÍA   methylphenidate (METADATE ER) 20 MG CR tablet 90 tablet 0 1/12/2022  No   Sig: Take one tab three times daily as needed for attention   Sent to pharmacy as: Methylphenidate HCl ER 20 MG Oral Tablet Extended Release (METADATE ER)   Class: E-Prescribe   Earliest Fill Date: 1/12/2022   Notes to Pharmacy: Fill Jan 12, 2022   Order: [id number]   E-Prescribing Status: Receipt confirmed by pharmacy (1/12/2022 11:10 AM CST)     Last refilled per : 1/12 #90, 12/10 #90, 11/8 #90    Medication pended and routed to provider for approval.

## 2022-02-12 ENCOUNTER — HEALTH MAINTENANCE LETTER (OUTPATIENT)
Age: 57
End: 2022-02-12

## 2022-03-18 ENCOUNTER — VIRTUAL VISIT (OUTPATIENT)
Dept: PSYCHIATRY | Facility: CLINIC | Age: 57
End: 2022-03-18
Attending: NURSE PRACTITIONER
Payer: COMMERCIAL

## 2022-03-18 DIAGNOSIS — F33.0 MAJOR DEPRESSIVE DISORDER, RECURRENT EPISODE, MILD (H): ICD-10-CM

## 2022-03-18 DIAGNOSIS — F90.9 ATTENTION DEFICIT HYPERACTIVITY DISORDER (ADHD), UNSPECIFIED ADHD TYPE: ICD-10-CM

## 2022-03-18 PROCEDURE — 99213 OFFICE O/P EST LOW 20 MIN: CPT | Mod: TEL | Performed by: NURSE PRACTITIONER

## 2022-03-18 RX ORDER — METHYLPHENIDATE HYDROCHLORIDE EXTENDED RELEASE 20 MG/1
TABLET ORAL
Qty: 90 TABLET | Refills: 0 | Status: SHIPPED | OUTPATIENT
Start: 2022-03-18 | End: 2022-03-18

## 2022-03-18 RX ORDER — GABAPENTIN 100 MG/1
100 CAPSULE ORAL 3 TIMES DAILY
Qty: 90 CAPSULE | Refills: 2 | Status: SHIPPED | OUTPATIENT
Start: 2022-03-18 | End: 2022-04-11

## 2022-03-18 RX ORDER — ESCITALOPRAM OXALATE 20 MG/1
20 TABLET ORAL DAILY
Qty: 90 TABLET | Refills: 0 | Status: SHIPPED | OUTPATIENT
Start: 2022-03-18 | End: 2022-07-11

## 2022-03-18 RX ORDER — METHYLPHENIDATE HYDROCHLORIDE EXTENDED RELEASE 20 MG/1
TABLET ORAL
Qty: 90 TABLET | Refills: 0 | Status: SHIPPED | OUTPATIENT
Start: 2022-03-18 | End: 2022-07-11

## 2022-03-18 RX ORDER — METHYLPHENIDATE HYDROCHLORIDE EXTENDED RELEASE 20 MG/1
20 TABLET ORAL 3 TIMES DAILY PRN
Qty: 90 TABLET | Refills: 0 | Status: SHIPPED | OUTPATIENT
Start: 2022-03-18 | End: 2022-07-11

## 2022-03-18 NOTE — PATIENT INSTRUCTIONS
**For crisis resources, please see the information at the end of this document**   Patient Education    Thank you for coming to the [facility] MENTAL HEALTH & ADDICTION [place] [facility].    Lab Testing:  If you had lab testing today and your results are reassuring or normal they will be mailed to you or sent through [employer] within 7 days. If the lab tests need quick action we will call you with the results. The phone number we will call with results is # [phone]. If this is not the best number please call our clinic and change the number.     Medication Refills:  If you need any refills please call your pharmacy and they will contact us. Our fax number for refills is [phone]. Please allow three business days for refill processing.   If you need to change to a different pharmacy, please contact the new pharmacy directly. The new pharmacy will help you get your medications transferred.     Contact Us:  Please call [phone] during business hours (8-5:00 M-F).  If you have medication related questions after clinic hours, or on the weekend, please call [phone].    Financial Assistance [phone]  Medical Records [phone]       MENTAL HEALTH CRISIS RESOURCES:  For a emergency help, please call 911 or go to the nearest Emergency Department.     Emergency Walk-In Options:   EmPATH Unit @ [facility] ([place]): [phone] - Specialized mental health emergency area designed to be calming  [facility] ([place]): [phone]  [facility] ([place]): [phone]  [facility]): [phone]    County Crisis Information:   [place]: [phone]  [place]: [phone]  [place] (MINAL) - Adult: [phone]     Child: [phone]  [place] - Adult: [phone]     Child: [phone]  [place]: [phone]  List of all [facility] resources:    https://[place].gov/dhs/people-we-serve/adults/health-care/mental-health/resources/crisis-contacts.jsp    National Crisis Information:   Crisis Text Line: Text  [place]  to 559394  National Suicide Prevention Lifeline: 7-953-168-TALK (1-570.529.2085)       For online chat options, visit https://suicidepreventionlifeline.org/chat/  Poison Control Center: 0-743-149-3030  Trans Lifeline: 4-962-394-5430 - Hotline for transgender people of all ages  The Dario Project: 8-069-148-2135 - Hotline for LGBT youth     For Non-Emergency Support:   Fast Tracker: Mental Health & Substance Use Disorder Resources -   https://www.raksuln.org/
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
## 2022-04-11 ENCOUNTER — MYC MEDICAL ADVICE (OUTPATIENT)
Dept: PSYCHIATRY | Facility: CLINIC | Age: 57
End: 2022-04-11
Payer: COMMERCIAL

## 2022-04-11 DIAGNOSIS — F33.0 MAJOR DEPRESSIVE DISORDER, RECURRENT EPISODE, MILD (H): ICD-10-CM

## 2022-04-11 RX ORDER — GABAPENTIN 100 MG/1
CAPSULE ORAL
Qty: 180 CAPSULE | Refills: 1 | Status: SHIPPED | OUTPATIENT
Start: 2022-04-11 | End: 2022-06-09

## 2022-04-11 RX ORDER — GABAPENTIN 100 MG/1
200 CAPSULE ORAL 3 TIMES DAILY
Qty: 180 CAPSULE | Refills: 1 | Status: CANCELLED | OUTPATIENT
Start: 2022-04-11

## 2022-04-11 NOTE — TELEPHONE ENCOUNTER
Last seen: 03/18/2022  RTC: 3 months  Cancel: none  No-show: none  Next appt: none     Incoming refill from Patient via ePatientFindert     Medication requested:   gabapentin (NEURONTIN) 100 MG capsule 90 capsule 2 3/18/2022  --   Sig - Route: Take 1 capsule (100 mg) by mouth 3 times daily as needed - Oral   Sent to pharmacy as: Gabapentin 100 MG Oral Capsule (NEURONTIN)   Class: E-Prescribe   Order: [id number]   E-Prescribing Status: Receipt confirmed by pharmacy (3/18/2022  4:07 PM CDT)     Last filled on 3/26/22 100mg # 90, 2/24/22 300 mg #90, 1/26/22 300 mg 90    See [employer] Message on 4/11/22 -I have found that 200 mg of gabapentin 3 times a day works best     Medication sent to provider for review.

## 2022-06-09 DIAGNOSIS — F33.0 MAJOR DEPRESSIVE DISORDER, RECURRENT EPISODE, MILD (H): ICD-10-CM

## 2022-06-09 RX ORDER — GABAPENTIN 100 MG/1
CAPSULE ORAL
Qty: 180 CAPSULE | Refills: 1 | Status: SHIPPED | OUTPATIENT
Start: 2022-06-09 | End: 2022-07-11

## 2022-06-09 NOTE — TELEPHONE ENCOUNTER
[facility] Call Center    Phone Message    May a detailed message be left on voicemail: yes     Reason for Call: Medication Refill Request    Has the patient contacted the pharmacy for the refill? Yes   Name of medication being requested: gabapentin  Provider who prescribed the medication: allen  Pharmacy:    [id number] [id number] [address]    Date medication is needed: pt has two left for tonight and will be out of meds starting tomorrow morning      Action Taken: Message routed to:  Other: nursing pool    Travel Screening: Not Applicable

## 2022-06-09 NOTE — TELEPHONE ENCOUNTER
Last Seen 3/18/2022  RTC 3 months  Cancel 0  No-Show 0    Next Appt 7/11/2022    Incoming Refill From patient via MyChart    Medication Requested   gabapentin (NEURONTIN) 100 MG capsule    Directions   Take two (2) 100mg caps three times daily as needed for anxiety    Qty 180    Last Refill 5/11/2022    Medication Refill Pended Per Refill Protocol

## 2022-07-11 ENCOUNTER — VIRTUAL VISIT (OUTPATIENT)
Dept: PSYCHIATRY | Facility: CLINIC | Age: 57
End: 2022-07-11
Attending: NURSE PRACTITIONER
Payer: COMMERCIAL

## 2022-07-11 DIAGNOSIS — F90.9 ATTENTION DEFICIT HYPERACTIVITY DISORDER (ADHD), UNSPECIFIED ADHD TYPE: ICD-10-CM

## 2022-07-11 DIAGNOSIS — F33.0 MAJOR DEPRESSIVE DISORDER, RECURRENT EPISODE, MILD (H): ICD-10-CM

## 2022-07-11 PROCEDURE — 99213 OFFICE O/P EST LOW 20 MIN: CPT | Mod: GT | Performed by: NURSE PRACTITIONER

## 2022-07-11 RX ORDER — GABAPENTIN 100 MG/1
CAPSULE ORAL
Qty: 180 CAPSULE | Refills: 2 | Status: SHIPPED | OUTPATIENT
Start: 2022-07-11 | End: 2022-09-09

## 2022-07-11 RX ORDER — ESCITALOPRAM OXALATE 20 MG/1
20 TABLET ORAL DAILY
Qty: 90 TABLET | Refills: 0 | Status: SHIPPED | OUTPATIENT
Start: 2022-07-11 | End: 2022-09-09

## 2022-07-11 RX ORDER — METHYLPHENIDATE HYDROCHLORIDE EXTENDED RELEASE 20 MG/1
20 TABLET ORAL 3 TIMES DAILY PRN
Qty: 90 TABLET | Refills: 0 | Status: SHIPPED | OUTPATIENT
Start: 2022-08-15 | End: 2022-09-09

## 2022-07-11 RX ORDER — METHYLPHENIDATE HYDROCHLORIDE EXTENDED RELEASE 20 MG/1
TABLET ORAL
Qty: 90 TABLET | Refills: 0 | Status: SHIPPED | OUTPATIENT
Start: 2022-07-18 | End: 2022-09-09

## 2022-07-11 ASSESSMENT — ANXIETY QUESTIONNAIRES
GAD7 TOTAL SCORE: 17
3. WORRYING TOO MUCH ABOUT DIFFERENT THINGS: NEARLY EVERY DAY
2. NOT BEING ABLE TO STOP OR CONTROL WORRYING: SEVERAL DAYS
4. TROUBLE RELAXING: NEARLY EVERY DAY
6. BECOMING EASILY ANNOYED OR IRRITABLE: SEVERAL DAYS
7. FEELING AFRAID AS IF SOMETHING AWFUL MIGHT HAPPEN: NEARLY EVERY DAY
1. FEELING NERVOUS, ANXIOUS, OR ON EDGE: NEARLY EVERY DAY
5. BEING SO RESTLESS THAT IT IS HARD TO SIT STILL: NEARLY EVERY DAY
GAD7 TOTAL SCORE: 17
GAD7 TOTAL SCORE: 17
8. IF YOU CHECKED OFF ANY PROBLEMS, HOW DIFFICULT HAVE THESE MADE IT FOR YOU TO DO YOUR WORK, TAKE CARE OF THINGS AT HOME, OR GET ALONG WITH OTHER PEOPLE?: SOMEWHAT DIFFICULT
7. FEELING AFRAID AS IF SOMETHING AWFUL MIGHT HAPPEN: NEARLY EVERY DAY

## 2022-07-11 ASSESSMENT — PATIENT HEALTH QUESTIONNAIRE - PHQ9
SUM OF ALL RESPONSES TO PHQ QUESTIONS 1-9: 9
SUM OF ALL RESPONSES TO PHQ QUESTIONS 1-9: 9
10. IF YOU CHECKED OFF ANY PROBLEMS, HOW DIFFICULT HAVE THESE PROBLEMS MADE IT FOR YOU TO DO YOUR WORK, TAKE CARE OF THINGS AT HOME, OR GET ALONG WITH OTHER PEOPLE: SOMEWHAT DIFFICULT
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
## 2022-07-11 NOTE — PATIENT INSTRUCTIONS
**For crisis resources, please see the information at the end of this document**   Patient Education    Thank you for coming to the [facility] [place] [facility].     Lab Testing:  If you had lab testing today and your results are reassuring or normal they will be mailed to you or sent through [employer] within 7 days. If the lab tests need quick action we will call you with the results. The phone number we will call with results is # [phone]. If this is not the best number please call our clinic and change the number.     Medication Refills:  If you need any refills please call your pharmacy and they will contact us. Our fax number for refills is [phone].   Three business days of notice are needed for general medication refill requests.   Five business days of notice are needed for controlled substance refill requests.   If you need to change to a different pharmacy, please contact the new pharmacy directly. The new pharmacy will help you get your medications transferred.     Contact Us:  Please call [phone] during business hours (8-5:00 M-F).   If you have medication related questions after clinic hours, or on the weekend, please call [phone].     Financial Assistance [phone]   Medical Records [phone]       MENTAL HEALTH CRISIS RESOURCES:  For a emergency help, please call 911 or go to the nearest Emergency Department.     Emergency Walk-In Options:   EmPATH Unit @ [facility] ([place]): [phone] - Specialized mental health emergency area designed to be calming  [facility] ([place]): [phone]  [facility] ([place]): [phone]  [facility]): [phone]    [facility] Crisis Information:   [place]: [phone]  [place]: [phone]  [place] ([place]) - Adult: [phone]     Child: [phone]  [place] - Adult: [phone]     Child: [phone]  [place]:  [phone]  List of all [facility] resources:   https://[place].gov/dhs/people-we-serve/adults/health-care/mental-health/resources/crisis-contacts.jsp    National Crisis Information:   Crisis Text Line: Text  [place]  to 571458  National Suicide Prevention Lifeline: 9-483-670-TALK (1-929.518.7493)       For online chat options, visit https://suicidepreventionlifeline.org/chat/  Poison Control Center: 6-610-743-2967  Trans Lifeline: 9-426-977-0040 - Hotline for transgender people of all ages  The Dario Project: 6-441-811-8507 - Hotline for LGBT youth     For Non-Emergency Support:   Fast Tracker: Mental Health & Substance Use Disorder Resources -   https://www.[employer].org/
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
## 2022-09-09 ENCOUNTER — VIRTUAL VISIT (OUTPATIENT)
Dept: PSYCHIATRY | Facility: CLINIC | Age: 57
End: 2022-09-09
Attending: NURSE PRACTITIONER
Payer: COMMERCIAL

## 2022-09-09 DIAGNOSIS — F90.9 ATTENTION DEFICIT HYPERACTIVITY DISORDER (ADHD), UNSPECIFIED ADHD TYPE: ICD-10-CM

## 2022-09-09 DIAGNOSIS — F33.0 MAJOR DEPRESSIVE DISORDER, RECURRENT EPISODE, MILD (H): ICD-10-CM

## 2022-09-09 PROCEDURE — 99214 OFFICE O/P EST MOD 30 MIN: CPT | Mod: GT | Performed by: NURSE PRACTITIONER

## 2022-09-09 RX ORDER — ESCITALOPRAM OXALATE 20 MG/1
20 TABLET ORAL DAILY
Qty: 90 TABLET | Refills: 0 | Status: SHIPPED | OUTPATIENT
Start: 2022-09-09 | End: 2022-11-14

## 2022-09-09 RX ORDER — METHYLPHENIDATE HYDROCHLORIDE EXTENDED RELEASE 20 MG/1
TABLET ORAL
Qty: 90 TABLET | Refills: 0 | Status: SHIPPED | OUTPATIENT
Start: 2022-09-17 | End: 2022-11-14

## 2022-09-09 RX ORDER — GABAPENTIN 100 MG/1
CAPSULE ORAL
Qty: 180 CAPSULE | Refills: 2 | Status: SHIPPED | OUTPATIENT
Start: 2022-09-09 | End: 2022-11-14

## 2022-09-09 RX ORDER — METHYLPHENIDATE HYDROCHLORIDE EXTENDED RELEASE 20 MG/1
20 TABLET ORAL 3 TIMES DAILY PRN
Qty: 90 TABLET | Refills: 0 | Status: SHIPPED | OUTPATIENT
Start: 2022-10-15 | End: 2022-11-14

## 2022-09-09 ASSESSMENT — PATIENT HEALTH QUESTIONNAIRE - PHQ9
10. IF YOU CHECKED OFF ANY PROBLEMS, HOW DIFFICULT HAVE THESE PROBLEMS MADE IT FOR YOU TO DO YOUR WORK, TAKE CARE OF THINGS AT HOME, OR GET ALONG WITH OTHER PEOPLE: NOT DIFFICULT AT ALL
SUM OF ALL RESPONSES TO PHQ QUESTIONS 1-9: 1
SUM OF ALL RESPONSES TO PHQ QUESTIONS 1-9: 1

## 2022-10-10 ENCOUNTER — HEALTH MAINTENANCE LETTER (OUTPATIENT)
Age: 57
End: 2022-10-10

## 2022-11-14 ENCOUNTER — VIRTUAL VISIT (OUTPATIENT)
Dept: PSYCHIATRY | Facility: CLINIC | Age: 57
End: 2022-11-14
Attending: NURSE PRACTITIONER
Payer: COMMERCIAL

## 2022-11-14 DIAGNOSIS — F90.9 ATTENTION DEFICIT HYPERACTIVITY DISORDER (ADHD), UNSPECIFIED ADHD TYPE: ICD-10-CM

## 2022-11-14 DIAGNOSIS — F33.0 MAJOR DEPRESSIVE DISORDER, RECURRENT EPISODE, MILD (H): ICD-10-CM

## 2022-11-14 PROCEDURE — 99214 OFFICE O/P EST MOD 30 MIN: CPT | Mod: GT | Performed by: NURSE PRACTITIONER

## 2022-11-14 RX ORDER — ESCITALOPRAM OXALATE 20 MG/1
20 TABLET ORAL DAILY
Qty: 90 TABLET | Refills: 0 | Status: SHIPPED | OUTPATIENT
Start: 2022-11-14 | End: 2023-04-03

## 2022-11-14 RX ORDER — GABAPENTIN 100 MG/1
CAPSULE ORAL
Qty: 180 CAPSULE | Refills: 2 | Status: SHIPPED | OUTPATIENT
Start: 2022-11-14 | End: 2023-04-03

## 2022-11-14 RX ORDER — METHYLPHENIDATE HYDROCHLORIDE EXTENDED RELEASE 10 MG/1
TABLET ORAL
Qty: 90 TABLET | Refills: 0 | Status: SHIPPED | OUTPATIENT
Start: 2022-11-16 | End: 2023-01-09

## 2022-11-14 RX ORDER — METHYLPHENIDATE HYDROCHLORIDE EXTENDED RELEASE 10 MG/1
10 TABLET ORAL 3 TIMES DAILY PRN
Qty: 30 TABLET | Refills: 0 | Status: SHIPPED | OUTPATIENT
Start: 2022-12-14 | End: 2023-01-09
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
## 2022-11-14 NOTE — PATIENT INSTRUCTIONS
**For crisis resources, please see the information at the end of this document**   Patient Education    Thank you for coming to the [facility] [place] [facility].     Lab Testing:  If you had lab testing today and your results are reassuring or normal they will be mailed to you or sent through [employer] within 7 days. If the lab tests need quick action we will call you with the results. The phone number we will call with results is # [phone]. If this is not the best number please call our clinic and change the number.     Medication Refills:  If you need any refills please call your pharmacy and they will contact us. Our fax number for refills is [phone].   Three business days of notice are needed for general medication refill requests.   Five business days of notice are needed for controlled substance refill requests.   If you need to change to a different pharmacy, please contact the new pharmacy directly. The new pharmacy will help you get your medications transferred.     Contact Us:  Please call [phone] during business hours (8-5:00 M-F).   If you have medication related questions after clinic hours, or on the weekend, please call [phone].     Financial Assistance [phone]   Medical Records [phone]       MENTAL HEALTH CRISIS RESOURCES:  For a emergency help, please call 911 or go to the nearest Emergency Department.     Emergency Walk-In Options:   [facility] Unit @ [facility] ([place]): [phone] - Specialized mental health emergency area designed to be calming  [facility] ([place]): [phone]  [facility] ([place]): [phone]  [facility]): [phone]    [facility] Crisis Information:   [place]: [phone]  [place]: [phone]  [place] ([place]) - Adult: [phone]     Child: [phone]  [place] - Adult: [phone]     Child: [phone]  [place]:  [phone]  List of all [facility] resources:   https://[place].gov/dhs/people-we-serve/adults/health-care/mental-health/resources/crisis-contacts.jsp    National Crisis Information:   Crisis Text Line: Text  [place]  to 253977  Suicide & Crisis Lifeline: 988  National Suicide Prevention Lifeline: 2-926-056-TALK (1-392.865.2597)       For online chat options, visit https://suicidepreventionlifeline.org/chat/  Poison Control Center: 6-662-700-5745  Trans Lifeline: 5-345-192-5280 - Hotline for transgender people of all ages  The [employer]ario Project: 0-449-644-1594 - Hotline for LGBT youth     For Non-Emergency Support:   Fast Tracker: Mental Health & Substance Use Disorder Resources -   https://www.[employer].org/

## 2023-01-08 ASSESSMENT — PATIENT HEALTH QUESTIONNAIRE - PHQ9
10. IF YOU CHECKED OFF ANY PROBLEMS, HOW DIFFICULT HAVE THESE PROBLEMS MADE IT FOR YOU TO DO YOUR WORK, TAKE CARE OF THINGS AT HOME, OR GET ALONG WITH OTHER PEOPLE: VERY DIFFICULT
SUM OF ALL RESPONSES TO PHQ QUESTIONS 1-9: 4
SUM OF ALL RESPONSES TO PHQ QUESTIONS 1-9: 4

## 2023-01-09 ENCOUNTER — VIRTUAL VISIT (OUTPATIENT)
Dept: PSYCHIATRY | Facility: CLINIC | Age: 58
End: 2023-01-09
Attending: NURSE PRACTITIONER
Payer: COMMERCIAL

## 2023-01-09 DIAGNOSIS — F90.9 ATTENTION DEFICIT HYPERACTIVITY DISORDER (ADHD), UNSPECIFIED ADHD TYPE: ICD-10-CM

## 2023-01-09 DIAGNOSIS — F33.0 MAJOR DEPRESSIVE DISORDER, RECURRENT EPISODE, MILD (H): ICD-10-CM

## 2023-01-09 PROCEDURE — 99214 OFFICE O/P EST MOD 30 MIN: CPT | Mod: GT | Performed by: NURSE PRACTITIONER

## 2023-01-09 RX ORDER — METHYLPHENIDATE HYDROCHLORIDE EXTENDED RELEASE 10 MG/1
10 TABLET ORAL 3 TIMES DAILY PRN
Qty: 60 TABLET | Refills: 0 | Status: SHIPPED | OUTPATIENT
Start: 2023-01-09 | End: 2023-04-03

## 2023-01-09 RX ORDER — METHYLPHENIDATE HYDROCHLORIDE EXTENDED RELEASE 10 MG/1
10 TABLET ORAL 3 TIMES DAILY PRN
Qty: 90 TABLET | Refills: 0 | Status: SHIPPED | OUTPATIENT
Start: 2023-03-06 | End: 2023-04-03

## 2023-01-09 RX ORDER — METHYLPHENIDATE HYDROCHLORIDE EXTENDED RELEASE 10 MG/1
TABLET ORAL
Qty: 90 TABLET | Refills: 0 | Status: SHIPPED | OUTPATIENT
Start: 2023-02-06 | End: 2023-04-03
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
## 2023-01-09 NOTE — PATIENT INSTRUCTIONS
**For crisis resources, please see the information at the end of this document**   Patient Education    Thank you for coming to the [facility] MENTAL HEALTH & ADDICTION [place] CLINIC.     Lab Testing:  If you had lab testing today and your results are reassuring or normal they will be mailed to you or sent through [employer] within 7 days. If the lab tests need quick action we will call you with the results. The phone number we will call with results is # [phone]. If this is not the best number please call our clinic and change the number.     Medication Refills:  If you need any refills please call your pharmacy and they will contact us. Our fax number for refills is [phone].   Three business days of notice are needed for general medication refill requests.   Five business days of notice are needed for controlled substance refill requests.   If you need to change to a different pharmacy, please contact the new pharmacy directly. The new pharmacy will help you get your medications transferred.     Contact Us:  Please call [phone] during business hours (8-5:00 M-F).   If you have medication related questions after clinic hours, or on the weekend, please call [phone].     Financial Assistance [phone]   Medical Records [phone]       MENTAL HEALTH CRISIS RESOURCES:  For a emergency help, please call 911 or go to the nearest Emergency Department.     Emergency Walk-In Options:   EmPATH Unit @ [facility] ([place]): [phone] - Specialized mental health emergency area designed to be calming  [facility] [place] ([place]): [phone]  [facility] ([place]): [phone]  [facility]): [phone]    [facility] Crisis Information:   [place]: [phone]  [place]: [phone]  [place] ([place]) - Adult: [phone]     Child: [phone]  [place] - Adult: [phone]     Child: [phone]  [place]:  [phone]  List of all [facility] resources:   https://[place].gov/dhs/people-we-serve/adults/health-care/mental-health/resources/crisis-contacts.jsp    National Crisis Information:   Crisis Text Line: Text  [place]  to 903455  Suicide & Crisis Lifeline: 988  National Suicide Prevention Lifeline: 1-772-888-TALK (1-524.140.7439)       For online chat options, visit https://suicidepreventionlifeline.org/chat/  Poison Control Center: 8-458-460-6625  Trans Lifeline: 5-644-910-6398 - Hotline for transgender people of all ages  The Dario Project: 9-132-713-0290 - Hotline for LGBT youth     For Non-Emergency Support:   Fast Tracker: Mental Health & Substance Use Disorder Resources -   https://www.[employer].org/

## 2023-02-13 DIAGNOSIS — F90.9 ATTENTION DEFICIT HYPERACTIVITY DISORDER (ADHD), UNSPECIFIED ADHD TYPE: ICD-10-CM

## 2023-02-18 ENCOUNTER — HEALTH MAINTENANCE LETTER (OUTPATIENT)
Age: 58
End: 2023-02-18

## 2023-04-03 ENCOUNTER — VIRTUAL VISIT (OUTPATIENT)
Dept: PSYCHIATRY | Facility: CLINIC | Age: 58
End: 2023-04-03
Attending: NURSE PRACTITIONER
Payer: COMMERCIAL

## 2023-04-03 DIAGNOSIS — F33.0 MAJOR DEPRESSIVE DISORDER, RECURRENT EPISODE, MILD (H): ICD-10-CM

## 2023-04-03 DIAGNOSIS — F90.9 ATTENTION DEFICIT HYPERACTIVITY DISORDER (ADHD), UNSPECIFIED ADHD TYPE: ICD-10-CM

## 2023-04-03 PROCEDURE — 99214 OFFICE O/P EST MOD 30 MIN: CPT | Mod: VID | Performed by: NURSE PRACTITIONER

## 2023-04-03 RX ORDER — METHYLPHENIDATE HYDROCHLORIDE EXTENDED RELEASE 10 MG/1
10 TABLET ORAL 3 TIMES DAILY PRN
Qty: 90 TABLET | Refills: 0 | Status: SHIPPED | OUTPATIENT
Start: 2023-06-02 | End: 2023-06-26

## 2023-04-03 RX ORDER — METHYLPHENIDATE HYDROCHLORIDE EXTENDED RELEASE 10 MG/1
TABLET ORAL
Qty: 90 TABLET | Refills: 0 | Status: SHIPPED | OUTPATIENT
Start: 2023-04-07 | End: 2023-06-26

## 2023-04-03 RX ORDER — METHYLPHENIDATE HYDROCHLORIDE EXTENDED RELEASE 10 MG/1
10 TABLET ORAL 3 TIMES DAILY PRN
Qty: 60 TABLET | Refills: 0 | Status: SHIPPED | OUTPATIENT
Start: 2023-05-05 | End: 2023-06-26

## 2023-04-03 RX ORDER — GABAPENTIN 100 MG/1
CAPSULE ORAL
Qty: 180 CAPSULE | Refills: 2 | Status: SHIPPED | OUTPATIENT
Start: 2023-04-03 | End: 2023-06-26

## 2023-04-03 RX ORDER — ESCITALOPRAM OXALATE 20 MG/1
20 TABLET ORAL DAILY
Qty: 90 TABLET | Refills: 0 | Status: SHIPPED | OUTPATIENT
Start: 2023-04-03 | End: 2023-06-26

## 2023-04-03 ASSESSMENT — PATIENT HEALTH QUESTIONNAIRE - PHQ9
10. IF YOU CHECKED OFF ANY PROBLEMS, HOW DIFFICULT HAVE THESE PROBLEMS MADE IT FOR YOU TO DO YOUR WORK, TAKE CARE OF THINGS AT HOME, OR GET ALONG WITH OTHER PEOPLE: VERY DIFFICULT
SUM OF ALL RESPONSES TO PHQ QUESTIONS 1-9: 9
SUM OF ALL RESPONSES TO PHQ QUESTIONS 1-9: 9

## 2023-04-03 NOTE — NURSING NOTE
Is the patient currently in the state of [place]? YES    Visit mode:VIDEO    If the visit is dropped, the patient can be reconnected by: VIDEO VISIT: Text to cell phone: [phone]    Will anyone else be joining the visit? NO      How would you like to obtain your AVS? MyChart    Are changes needed to the allergy or medication list? YES: Pt stated methylphenidate is listed multiple times. Please remove duplicates.    Reason for visit: Psychiatry Return    JUNIOR Dorsey on 4/3/2023 at 8:49 AM
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
## 2023-04-03 NOTE — PATIENT INSTRUCTIONS
**For crisis resources, please see the information at the end of this document**   Patient Education    Thank you for coming to the [facility] MENTAL HEALTH & ADDICTION [place] CLINIC.     Lab Testing:  If you had lab testing today and your results are reassuring or normal they will be mailed to you or sent through [employer] within 7 days. If the lab tests need quick action we will call you with the results. The phone number we will call with results is # [phone]. If this is not the best number please call our clinic and change the number.     Medication Refills:  If you need any refills please call your pharmacy and they will contact us. Our fax number for refills is [phone].   Three business days of notice are needed for general medication refill requests.   Five business days of notice are needed for controlled substance refill requests.   If you need to change to a different pharmacy, please contact the new pharmacy directly. The new pharmacy will help you get your medications transferred.     Contact Us:  Please call [phone] during business hours (8-5:00 M-F).   If you have medication related questions after clinic hours, or on the weekend, please call [phone].     Financial Assistance [phone]   Medical Records [phone]       MENTAL HEALTH CRISIS RESOURCES:  For a emergency help, please call 911 or go to the nearest Emergency Department.     Emergency Walk-In Options:   EmPATH Unit @ [facility] ([place]): [phone] - Specialized mental health emergency area designed to be calming  [facility] ([place]): [phone]  [facility] Acute Psych[facility] ([place]): [phone]  [facility]): [phone]    [facility] Crisis Information:   [place]: [phone]  [place]: [phone]  [place] ([place]) - Adult: [phone]     Child: [phone]  [place] - Adult: [phone]     Child: [phone]  [place]:  [phone]  List of all [facility] resources:   https://[place].gov/dhs/people-we-serve/adults/health-care/mental-health/resources/crisis-contacts.jsp    National Crisis Information:   Crisis Text Line: Text  [place]  to 279911  Suicide & Crisis Lifeline: 988  National Suicide Prevention Lifeline: 4-857-445-TALK (1-906.359.3075)       For online chat options, visit https://suicidepreventionlifeline.org/chat/  Poison Control Center: 8-192-386-3844  Trans Lifeline: 3-667-711-4451 - Hotline for transgender people of all ages  The [employer]ario Project: 8-908-961-5697 - Hotline for LGBT youth     For Non-Emergency Support:   Fast Tracker: Mental Health & Substance Use Disorder Resources -   https://www.[employer].org/

## 2023-05-31 ENCOUNTER — TELEPHONE (OUTPATIENT)
Dept: PSYCHIATRY | Facility: CLINIC | Age: 58
End: 2023-05-31
Payer: COMMERCIAL

## 2023-05-31 NOTE — TELEPHONE ENCOUNTER
[facility] Call Center    Phone Message    May a detailed message be left on voicemail: yes     Reason for Call: Medication Question or concern regarding medication   Prescription Clarification  Name of Medication: Methylphenidate  Prescribing Provider: Fior Bazan   Pharmacy: [id number] [address] [address]   What on the order needs clarification? Pt received 10mg 'take 3 times a day 60 tab' but says the pharmacy did not give her enough.          Action Taken: Message routed to:  Other: P PSYCHIATRY NURSE-P    Travel Screening: Not Applicable

## 2023-05-31 NOTE — TELEPHONE ENCOUNTER
Spoke to pharmacy and medication was only written for 60 in May and patient can fill again on 6/2. Pharmacist states that he spoke to her a couple of days ago and she had enough until she can fill again. Attempted to call patient.  No answer. Left voicemail with information and to call back clinic at [phone] if needed.

## 2023-06-26 ENCOUNTER — VIRTUAL VISIT (OUTPATIENT)
Dept: PSYCHIATRY | Facility: CLINIC | Age: 58
End: 2023-06-26
Attending: NURSE PRACTITIONER
Payer: COMMERCIAL

## 2023-06-26 DIAGNOSIS — F90.9 ATTENTION DEFICIT HYPERACTIVITY DISORDER (ADHD), UNSPECIFIED ADHD TYPE: ICD-10-CM

## 2023-06-26 DIAGNOSIS — F33.0 MAJOR DEPRESSIVE DISORDER, RECURRENT EPISODE, MILD (H): ICD-10-CM

## 2023-06-26 PROCEDURE — 99214 OFFICE O/P EST MOD 30 MIN: CPT | Mod: 93 | Performed by: NURSE PRACTITIONER

## 2023-06-26 RX ORDER — METHYLPHENIDATE HYDROCHLORIDE EXTENDED RELEASE 10 MG/1
10 TABLET ORAL 3 TIMES DAILY PRN
Qty: 60 TABLET | Refills: 0 | Status: SHIPPED | OUTPATIENT
Start: 2023-07-28 | End: 2023-08-04

## 2023-06-26 RX ORDER — GABAPENTIN 100 MG/1
CAPSULE ORAL
Qty: 180 CAPSULE | Refills: 2 | Status: SHIPPED | OUTPATIENT
Start: 2023-06-26 | End: 2023-09-25

## 2023-06-26 RX ORDER — ESCITALOPRAM OXALATE 20 MG/1
20 TABLET ORAL DAILY
Qty: 90 TABLET | Refills: 0 | Status: SHIPPED | OUTPATIENT
Start: 2023-06-26 | End: 2023-09-25

## 2023-06-26 RX ORDER — METHYLPHENIDATE HYDROCHLORIDE EXTENDED RELEASE 10 MG/1
TABLET ORAL
Qty: 90 TABLET | Refills: 0 | Status: SHIPPED | OUTPATIENT
Start: 2023-06-30 | End: 2023-09-25

## 2023-06-26 RX ORDER — METHYLPHENIDATE HYDROCHLORIDE EXTENDED RELEASE 10 MG/1
10 TABLET ORAL 3 TIMES DAILY PRN
Qty: 90 TABLET | Refills: 0 | Status: SHIPPED | OUTPATIENT
Start: 2023-08-25 | End: 2023-09-25

## 2023-06-26 ASSESSMENT — ANXIETY QUESTIONNAIRES
5. BEING SO RESTLESS THAT IT IS HARD TO SIT STILL: NOT AT ALL
GAD7 TOTAL SCORE: 15
6. BECOMING EASILY ANNOYED OR IRRITABLE: MORE THAN HALF THE DAYS
GAD7 TOTAL SCORE: 15
3. WORRYING TOO MUCH ABOUT DIFFERENT THINGS: NEARLY EVERY DAY
7. FEELING AFRAID AS IF SOMETHING AWFUL MIGHT HAPPEN: NEARLY EVERY DAY
IF YOU CHECKED OFF ANY PROBLEMS ON THIS QUESTIONNAIRE, HOW DIFFICULT HAVE THESE PROBLEMS MADE IT FOR YOU TO DO YOUR WORK, TAKE CARE OF THINGS AT HOME, OR GET ALONG WITH OTHER PEOPLE: VERY DIFFICULT
8. IF YOU CHECKED OFF ANY PROBLEMS, HOW DIFFICULT HAVE THESE MADE IT FOR YOU TO DO YOUR WORK, TAKE CARE OF THINGS AT HOME, OR GET ALONG WITH OTHER PEOPLE?: VERY DIFFICULT
2. NOT BEING ABLE TO STOP OR CONTROL WORRYING: NEARLY EVERY DAY
4. TROUBLE RELAXING: NEARLY EVERY DAY
7. FEELING AFRAID AS IF SOMETHING AWFUL MIGHT HAPPEN: NEARLY EVERY DAY
GAD7 TOTAL SCORE: 15
1. FEELING NERVOUS, ANXIOUS, OR ON EDGE: SEVERAL DAYS

## 2023-06-26 ASSESSMENT — PATIENT HEALTH QUESTIONNAIRE - PHQ9
SUM OF ALL RESPONSES TO PHQ QUESTIONS 1-9: 10
SUM OF ALL RESPONSES TO PHQ QUESTIONS 1-9: 10
10. IF YOU CHECKED OFF ANY PROBLEMS, HOW DIFFICULT HAVE THESE PROBLEMS MADE IT FOR YOU TO DO YOUR WORK, TAKE CARE OF THINGS AT HOME, OR GET ALONG WITH OTHER PEOPLE: VERY DIFFICULT

## 2023-06-26 NOTE — NURSING NOTE
Is the patient currently in the state of [place]? YES    Visit mode:TELEPHONE    If the visit is dropped, the patient can be reconnected by: TELEPHONE VISIT: Phone number: [phone]    Will anyone else be joining the visit? NO      How would you like to obtain your AVS? MyChart    Are changes needed to the allergy or medication list? NO    Reason for visit: RECHECK           97
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
## 2023-08-04 ENCOUNTER — MYC MEDICAL ADVICE (OUTPATIENT)
Dept: PSYCHIATRY | Facility: CLINIC | Age: 58
End: 2023-08-04
Payer: COMMERCIAL

## 2023-08-04 DIAGNOSIS — F90.9 ATTENTION DEFICIT HYPERACTIVITY DISORDER (ADHD), UNSPECIFIED ADHD TYPE: ICD-10-CM

## 2023-08-04 RX ORDER — METHYLPHENIDATE HYDROCHLORIDE EXTENDED RELEASE 10 MG/1
10 TABLET ORAL 3 TIMES DAILY PRN
Qty: 90 TABLET | Refills: 0 | Status: SHIPPED | OUTPATIENT
Start: 2023-08-04 | End: 2023-09-25

## 2023-08-04 NOTE — TELEPHONE ENCOUNTER
Received TORB from Fior Bazan:   - Shared the methylphenidate (METADATE ER) 10 MG CR tablet- Take 1 tablet (10 mg) by mouth 3 times daily as needed (attention) - Oral should be #90 as the patient is taking it TID     - New rx pended with correct quantity and routed to POD to sign off   - Once new rx is signed off, writer will call [facility] #[id number] - [facility] [address] [address] and cancel previous rx of Metadate sent on 7/28

## 2023-08-04 NOTE — TELEPHONE ENCOUNTER
- Meds refilled by provider   - Med tab changed to reflect this   - Patient notified via [employer]     Reached out to [facility] #[id number] - [place], [place] - [address] [address] and spoke with pharmacist, who agreed to discontinue methylphenidate (METADATE ER) 10 MG CR tablet #60.     No further action needed by this writer

## 2023-08-04 NOTE — TELEPHONE ENCOUNTER
Last seen: 6/26  RTC: 3 months   Cancel: none   No-show: none   Next appt: 9/25    Incoming refill from patient via [email or web address]      Disp Refills Start End MARÍA   methylphenidate (METADATE ER) 10 MG CR tablet 60 tablet 0 7/28/2023  No   Sig - Route: Take 1 tablet (10 mg) by mouth 3 times daily as needed (attention) - Oral   Sent to pharmacy as: Methylphenidate HCl ER 10 MG Oral Tablet Extended Release (METADATE ER)   Class: E-Prescribe   Earliest Fill Date: 7/28/2023   Order: [id number]   E-Prescribing Status: Receipt confirmed by pharmacy (6/26/2023  9:02 AM CDT)   Per  last refilled: 7/5 #90, 6/2 #90, 5/8 #60    Patient is requesting for a 30 day supply. Currently the rx is sent for 20 day supply. Will reach out to provider for clarification.

## 2023-09-25 ENCOUNTER — VIRTUAL VISIT (OUTPATIENT)
Dept: PSYCHIATRY | Facility: CLINIC | Age: 58
End: 2023-09-25
Attending: NURSE PRACTITIONER
Payer: COMMERCIAL

## 2023-09-25 DIAGNOSIS — F33.0 MAJOR DEPRESSIVE DISORDER, RECURRENT EPISODE, MILD (H): ICD-10-CM

## 2023-09-25 DIAGNOSIS — F90.9 ATTENTION DEFICIT HYPERACTIVITY DISORDER (ADHD), UNSPECIFIED ADHD TYPE: ICD-10-CM

## 2023-09-25 PROCEDURE — 99214 OFFICE O/P EST MOD 30 MIN: CPT | Mod: VID | Performed by: NURSE PRACTITIONER

## 2023-09-25 RX ORDER — METHYLPHENIDATE HYDROCHLORIDE EXTENDED RELEASE 10 MG/1
10 TABLET ORAL 3 TIMES DAILY PRN
Qty: 90 TABLET | Refills: 0 | Status: SHIPPED | OUTPATIENT
Start: 2023-10-31 | End: 2023-12-18

## 2023-09-25 RX ORDER — METHYLPHENIDATE HYDROCHLORIDE EXTENDED RELEASE 10 MG/1
TABLET ORAL
Qty: 90 TABLET | Refills: 0 | Status: SHIPPED | OUTPATIENT
Start: 2023-10-03 | End: 2023-12-18

## 2023-09-25 RX ORDER — GABAPENTIN 100 MG/1
CAPSULE ORAL
Qty: 180 CAPSULE | Refills: 2 | Status: SHIPPED | OUTPATIENT
Start: 2023-12-03 | End: 2023-12-18

## 2023-09-25 RX ORDER — ESCITALOPRAM OXALATE 20 MG/1
20 TABLET ORAL DAILY
Qty: 90 TABLET | Refills: 0 | Status: SHIPPED | OUTPATIENT
Start: 2023-09-25 | End: 2023-12-18

## 2023-09-25 RX ORDER — METHYLPHENIDATE HYDROCHLORIDE EXTENDED RELEASE 10 MG/1
10 TABLET ORAL 3 TIMES DAILY PRN
Qty: 90 TABLET | Refills: 0 | Status: SHIPPED | OUTPATIENT
Start: 2023-11-28 | End: 2023-12-18

## 2023-09-25 ASSESSMENT — PATIENT HEALTH QUESTIONNAIRE - PHQ9
10. IF YOU CHECKED OFF ANY PROBLEMS, HOW DIFFICULT HAVE THESE PROBLEMS MADE IT FOR YOU TO DO YOUR WORK, TAKE CARE OF THINGS AT HOME, OR GET ALONG WITH OTHER PEOPLE: SOMEWHAT DIFFICULT
SUM OF ALL RESPONSES TO PHQ QUESTIONS 1-9: 7
SUM OF ALL RESPONSES TO PHQ QUESTIONS 1-9: 7

## 2023-09-25 ASSESSMENT — PAIN SCALES - GENERAL: PAINLEVEL: MODERATE PAIN (5)

## 2023-09-25 NOTE — PATIENT INSTRUCTIONS
**For crisis resources, please see the information at the end of this document**   Patient Education    Thank you for coming to the [facility].     Lab Testing:  If you had lab testing today and your results are reassuring or normal they will be mailed to you or sent through [employer] within 7 days. If the lab tests need quick action we will call you with the results. The phone number we will call with results is # [phone]. If this is not the best number please call our clinic and change the number.     Medication Refills:  If you need any refills please call your pharmacy and they will contact us. Our fax number for refills is [phone].   Three business days of notice are needed for general medication refill requests.   Five business days of notice are needed for controlled substance refill requests.   If you need to change to a different pharmacy, please contact the new pharmacy directly. The new pharmacy will help you get your medications transferred.     Contact Us:  Please call [phone] during business hours (8-5:00 M-F).   If you have medication related questions after clinic hours, or on the weekend, please call [phone].     Financial Assistance [phone]   Medical Records [phone]       MENTAL HEALTH CRISIS RESOURCES:  For a emergency help, please call 911 or go to the nearest Emergency Department.     Emergency Walk-In Options:   EmPATH Unit @ [facility] ([place]): [phone] - Specialized mental health emergency area designed to be calming  [facility] ([place]): [phone]  [facility] ([place]): [phone]  [facility]): [phone]    [facility] Crisis Information:   [place]: [phone]  [place]: [phone]  [place] ([place]) - Adult: [phone]     Child: [phone]  [place] - Adult: [phone]     Child: [phone]  [place]:  [phone]  List of all [facility] resources:   https://[place].gov/dhs/people-we-serve/adults/health-care/mental-health/resources/crisis-contacts.jsp    National Crisis Information:   Crisis Text Line: Text  [place]  to 215177  Suicide & Crisis Lifeline: 988  National Suicide Prevention Lifeline: 5-494-775-TALK (1-627.799.8644)       For online chat options, visit https://suicidepreventionlifeline.org/chat/  Poison Control Center: 0-765-984-1056  Trans Lifeline: 4-868-690-7791 - Hotline for transgender people of all ages  The [employer]ario Project: 9-833-423-7830 - Hotline for LGBT youth     For Non-Emergency Support:   Fast Tracker: Mental Health & Substance Use Disorder Resources -   https://www.[employer].org/

## 2023-09-25 NOTE — NURSING NOTE
Is the patient currently in the state of [place]? YES    Visit mode:VIDEO    If the visit is dropped, the patient can be reconnected by: VIDEO VISIT: Text to cell phone:   Telephone Information:   Mobile [phone]       Will anyone else be joining the visit? NO  (If patient encounters technical issues they should call [phone]    How would you like to obtain your AVS? MyChart    Are changes needed to the allergy or medication list? No    Reason for visit: RECHECK    Vidhya VALDIVIA
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
## 2023-10-28 ENCOUNTER — HEALTH MAINTENANCE LETTER (OUTPATIENT)
Age: 58
End: 2023-10-28

## 2023-12-18 ENCOUNTER — VIRTUAL VISIT (OUTPATIENT)
Dept: PSYCHIATRY | Facility: CLINIC | Age: 58
End: 2023-12-18
Attending: NURSE PRACTITIONER
Payer: COMMERCIAL

## 2023-12-18 DIAGNOSIS — F90.9 ATTENTION DEFICIT HYPERACTIVITY DISORDER (ADHD), UNSPECIFIED ADHD TYPE: ICD-10-CM

## 2023-12-18 DIAGNOSIS — F33.0 MAJOR DEPRESSIVE DISORDER, RECURRENT EPISODE, MILD (H): ICD-10-CM

## 2023-12-18 PROCEDURE — 99214 OFFICE O/P EST MOD 30 MIN: CPT | Mod: VID | Performed by: NURSE PRACTITIONER

## 2023-12-18 RX ORDER — METHYLPHENIDATE HYDROCHLORIDE EXTENDED RELEASE 10 MG/1
10 TABLET ORAL 3 TIMES DAILY PRN
Qty: 90 TABLET | Refills: 0 | Status: SHIPPED | OUTPATIENT
Start: 2024-03-09 | End: 2024-03-11

## 2023-12-18 RX ORDER — ESCITALOPRAM OXALATE 20 MG/1
20 TABLET ORAL DAILY
Qty: 90 TABLET | Refills: 0 | Status: SHIPPED | OUTPATIENT
Start: 2023-12-18 | End: 2024-03-11

## 2023-12-18 RX ORDER — METHYLPHENIDATE HYDROCHLORIDE EXTENDED RELEASE 10 MG/1
10 TABLET ORAL 3 TIMES DAILY PRN
Qty: 90 TABLET | Refills: 0 | Status: SHIPPED | OUTPATIENT
Start: 2024-01-13 | End: 2024-03-11

## 2023-12-18 RX ORDER — GABAPENTIN 100 MG/1
CAPSULE ORAL
Qty: 180 CAPSULE | Refills: 2 | Status: SHIPPED | OUTPATIENT
Start: 2023-12-18 | End: 2024-03-11

## 2023-12-18 RX ORDER — METHYLPHENIDATE HYDROCHLORIDE EXTENDED RELEASE 10 MG/1
TABLET ORAL
Qty: 90 TABLET | Refills: 0 | Status: SHIPPED | OUTPATIENT
Start: 2024-02-10 | End: 2024-03-11

## 2023-12-18 ASSESSMENT — PATIENT HEALTH QUESTIONNAIRE - PHQ9
SUM OF ALL RESPONSES TO PHQ QUESTIONS 1-9: 13
SUM OF ALL RESPONSES TO PHQ QUESTIONS 1-9: 13
10. IF YOU CHECKED OFF ANY PROBLEMS, HOW DIFFICULT HAVE THESE PROBLEMS MADE IT FOR YOU TO DO YOUR WORK, TAKE CARE OF THINGS AT HOME, OR GET ALONG WITH OTHER PEOPLE: VERY DIFFICULT

## 2023-12-18 ASSESSMENT — ANXIETY QUESTIONNAIRES
6. BECOMING EASILY ANNOYED OR IRRITABLE: SEVERAL DAYS
IF YOU CHECKED OFF ANY PROBLEMS ON THIS QUESTIONNAIRE, HOW DIFFICULT HAVE THESE PROBLEMS MADE IT FOR YOU TO DO YOUR WORK, TAKE CARE OF THINGS AT HOME, OR GET ALONG WITH OTHER PEOPLE: SOMEWHAT DIFFICULT
7. FEELING AFRAID AS IF SOMETHING AWFUL MIGHT HAPPEN: NEARLY EVERY DAY
2. NOT BEING ABLE TO STOP OR CONTROL WORRYING: SEVERAL DAYS
GAD7 TOTAL SCORE: 11
5. BEING SO RESTLESS THAT IT IS HARD TO SIT STILL: NOT AT ALL
1. FEELING NERVOUS, ANXIOUS, OR ON EDGE: SEVERAL DAYS
4. TROUBLE RELAXING: NEARLY EVERY DAY
GAD7 TOTAL SCORE: 11
3. WORRYING TOO MUCH ABOUT DIFFERENT THINGS: MORE THAN HALF THE DAYS
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
## 2024-03-11 ENCOUNTER — VIRTUAL VISIT (OUTPATIENT)
Dept: PSYCHIATRY | Facility: CLINIC | Age: 59
End: 2024-03-11
Attending: NURSE PRACTITIONER
Payer: COMMERCIAL

## 2024-03-11 DIAGNOSIS — F90.9 ATTENTION DEFICIT HYPERACTIVITY DISORDER (ADHD), UNSPECIFIED ADHD TYPE: ICD-10-CM

## 2024-03-11 DIAGNOSIS — F33.0 MAJOR DEPRESSIVE DISORDER, RECURRENT EPISODE, MILD (H): ICD-10-CM

## 2024-03-11 PROCEDURE — 99214 OFFICE O/P EST MOD 30 MIN: CPT | Mod: 95 | Performed by: NURSE PRACTITIONER

## 2024-03-11 RX ORDER — ESCITALOPRAM OXALATE 20 MG/1
20 TABLET ORAL DAILY
Qty: 90 TABLET | Refills: 0 | Status: SHIPPED | OUTPATIENT
Start: 2024-03-11 | End: 2024-06-10

## 2024-03-11 RX ORDER — METHYLPHENIDATE HYDROCHLORIDE EXTENDED RELEASE 10 MG/1
10 TABLET ORAL 3 TIMES DAILY PRN
Qty: 90 TABLET | Refills: 0 | Status: SHIPPED | OUTPATIENT
Start: 2024-03-11 | End: 2024-06-10

## 2024-03-11 RX ORDER — GABAPENTIN 100 MG/1
CAPSULE ORAL
Qty: 180 CAPSULE | Refills: 2 | Status: SHIPPED | OUTPATIENT
Start: 2024-03-11 | End: 2024-06-10

## 2024-03-11 RX ORDER — METHYLPHENIDATE HYDROCHLORIDE EXTENDED RELEASE 10 MG/1
TABLET ORAL
Qty: 90 TABLET | Refills: 0 | Status: SHIPPED | OUTPATIENT
Start: 2024-04-08 | End: 2024-06-10

## 2024-03-11 RX ORDER — METHYLPHENIDATE HYDROCHLORIDE EXTENDED RELEASE 10 MG/1
10 TABLET ORAL 3 TIMES DAILY PRN
Qty: 90 TABLET | Refills: 0 | Status: SHIPPED | OUTPATIENT
Start: 2024-05-06 | End: 2024-06-10

## 2024-03-11 ASSESSMENT — PAIN SCALES - GENERAL: PAINLEVEL: MODERATE PAIN (4)

## 2024-03-11 ASSESSMENT — PATIENT HEALTH QUESTIONNAIRE - PHQ9
10. IF YOU CHECKED OFF ANY PROBLEMS, HOW DIFFICULT HAVE THESE PROBLEMS MADE IT FOR YOU TO DO YOUR WORK, TAKE CARE OF THINGS AT HOME, OR GET ALONG WITH OTHER PEOPLE: SOMEWHAT DIFFICULT
SUM OF ALL RESPONSES TO PHQ QUESTIONS 1-9: 9
SUM OF ALL RESPONSES TO PHQ QUESTIONS 1-9: 9

## 2024-03-11 NOTE — NURSING NOTE
Is the patient currently in the state of [place]? YES    Visit mode:VIDEO    If the visit is dropped, the patient can be reconnected by: VIDEO VISIT: Text to cell phone:   Telephone Information:   Mobile [phone]       Will anyone else be joining the visit? NO  (If patient encounters technical issues they should call [phone]    How would you like to obtain your AVS? MyChart    Are changes needed to the allergy or medication list? Yes see DME on med list flagged for removal. Along, with duplicate:  -methylphenidate (METADATE ER) 10 MG CR tablet     Reason for visit: RECHBROOK Ramos VVF

## 2024-03-11 NOTE — PATIENT INSTRUCTIONS
**For crisis resources, please see the information at the end of this document**   Patient Education    Thank you for coming to the [facility] [place] [facility].     Lab Testing:  If you had lab testing today and your results are reassuring or normal they will be mailed to you or sent through [employer] within 7 days. If the lab tests need quick action we will call you with the results. The phone number we will call with results is # [phone]. If this is not the best number please call our clinic and change the number.     Medication Refills:  If you need any refills please call your pharmacy and they will contact us. Our fax number for refills is [phone].   Three business days of notice are needed for general medication refill requests.   Five business days of notice are needed for controlled substance refill requests.   If you need to change to a different pharmacy, please contact the new pharmacy directly. The new pharmacy will help you get your medications transferred.     Contact Us:  Please call [phone] during business hours (8-5:00 M-F).   If you have medication related questions after clinic hours, or on the weekend, please call [phone].     Financial Assistance [phone]   Medical Records [phone]       MENTAL HEALTH CRISIS RESOURCES:  For a emergency help, please call 911 or go to the nearest Emergency Department.     Emergency Walk-In Options:   EmPATH Unit @ [facility] ([place]): [phone] - Specialized mental health emergency area designed to be calming  [facility] ([place]): [phone]  [facility] ([place]): [phone]  [facility]): [phone]    [facility] Crisis Information:   [place]: [phone]  [place]: [phone]  [place] ([place]) - Adult: [phone]     Child: [phone]  [place] - Adult: [phone]     Child: [phone]  [place]:  [phone]  List of all [facility] resources:   https://[place].gov/dhs/people-we-serve/adults/health-care/mental-health/resources/crisis-contacts.jsp    National Crisis Information:   Crisis Text Line: Text  [place]  to 751795  Suicide & Crisis Lifeline: 988  National Suicide Prevention Lifeline: 8-899-779-TALK (1-598.725.1930)       For online chat options, visit https://suicidepreventionlifeline.org/chat/  Poison Control Center: 5-579-202-9855  Trans Lifeline: 0-347-714-8612 - Hotline for transgender people of all ages  The Dario Project: 7-876-135-0634 - Hotline for LGBT youth     For Non-Emergency Support:   Fast Tracker: Mental Health & Substance Use Disorder Resources -   https://www.[employer] Messengern.org/
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
## 2024-03-16 ENCOUNTER — HEALTH MAINTENANCE LETTER (OUTPATIENT)
Age: 59
End: 2024-03-16

## 2024-06-09 ASSESSMENT — PATIENT HEALTH QUESTIONNAIRE - PHQ9
SUM OF ALL RESPONSES TO PHQ QUESTIONS 1-9: 4
SUM OF ALL RESPONSES TO PHQ QUESTIONS 1-9: 4
10. IF YOU CHECKED OFF ANY PROBLEMS, HOW DIFFICULT HAVE THESE PROBLEMS MADE IT FOR YOU TO DO YOUR WORK, TAKE CARE OF THINGS AT HOME, OR GET ALONG WITH OTHER PEOPLE: SOMEWHAT DIFFICULT

## 2024-06-10 ENCOUNTER — VIRTUAL VISIT (OUTPATIENT)
Dept: PSYCHIATRY | Facility: CLINIC | Age: 59
End: 2024-06-10
Attending: NURSE PRACTITIONER
Payer: COMMERCIAL

## 2024-06-10 VITALS — WEIGHT: 215 LBS | BODY MASS INDEX: 31.84 KG/M2 | HEIGHT: 69 IN

## 2024-06-10 DIAGNOSIS — F90.9 ATTENTION DEFICIT HYPERACTIVITY DISORDER (ADHD), UNSPECIFIED ADHD TYPE: ICD-10-CM

## 2024-06-10 DIAGNOSIS — F33.0 MAJOR DEPRESSIVE DISORDER, RECURRENT EPISODE, MILD (H): ICD-10-CM

## 2024-06-10 PROCEDURE — 99214 OFFICE O/P EST MOD 30 MIN: CPT | Mod: 95 | Performed by: NURSE PRACTITIONER

## 2024-06-10 RX ORDER — ESCITALOPRAM OXALATE 20 MG/1
20 TABLET ORAL DAILY
Qty: 90 TABLET | Refills: 0 | Status: SHIPPED | OUTPATIENT
Start: 2024-06-10 | End: 2024-09-09

## 2024-06-10 RX ORDER — METHYLPHENIDATE HYDROCHLORIDE EXTENDED RELEASE 10 MG/1
10 TABLET ORAL 3 TIMES DAILY PRN
Qty: 90 TABLET | Refills: 0 | Status: SHIPPED | OUTPATIENT
Start: 2024-08-05 | End: 2024-08-07

## 2024-06-10 RX ORDER — METHYLPHENIDATE HYDROCHLORIDE EXTENDED RELEASE 10 MG/1
10 TABLET ORAL 3 TIMES DAILY PRN
Qty: 90 TABLET | Refills: 0 | Status: SHIPPED | OUTPATIENT
Start: 2024-06-10 | End: 2024-09-09

## 2024-06-10 RX ORDER — METHYLPHENIDATE HYDROCHLORIDE EXTENDED RELEASE 10 MG/1
TABLET ORAL
Qty: 90 TABLET | Refills: 0 | Status: SHIPPED | OUTPATIENT
Start: 2024-07-08 | End: 2024-09-09

## 2024-06-10 RX ORDER — GABAPENTIN 100 MG/1
CAPSULE ORAL
Qty: 180 CAPSULE | Refills: 2 | Status: SHIPPED | OUTPATIENT
Start: 2024-06-10 | End: 2024-09-09

## 2024-06-10 ASSESSMENT — PAIN SCALES - GENERAL: PAINLEVEL: MODERATE PAIN (5)

## 2024-06-10 NOTE — NURSING NOTE
Is the patient currently in the state of [place]? YES    Visit mode:VIDEO    If the visit is dropped, the patient can be reconnected by: VIDEO VISIT: Text to cell phone:   Telephone Information:   Mobile [phone]       Will anyone else be joining the visit? NO  (If patient encounters technical issues they should call [phone]    How would you like to obtain your AVS? MyChart    Are changes needed to the allergy or medication list? No  Patient denies any changes since echeck-in regarding medication and allergies and states all information entered during echeck-in remains accurate.     Are refills needed on medications prescribed by this physician? Yes    Reason for visit: RECHECK    Ana VALDIVIA

## 2024-06-10 NOTE — PATIENT INSTRUCTIONS
**For crisis resources, please see the information at the end of this document**   Patient Education    Thank you for coming to the [facility] [place] [facility].     Lab Testing:  If you had lab testing today and your results are reassuring or normal they will be mailed to you or sent through [employer] within 7 days. If the lab tests need quick action we will call you with the results. The phone number we will call with results is # [phone]. If this is not the best number please call our clinic and change the number.     Medication Refills:  If you need any refills please call your pharmacy and they will contact us. Our fax number for refills is [phone].   Three business days of notice are needed for general medication refill requests.   Five business days of notice are needed for controlled substance refill requests.   If you need to change to a different pharmacy, please contact the new pharmacy directly. The new pharmacy will help you get your medications transferred.     Contact Us:  Please call [phone] during business hours (8-5:00 M-F).   If you have medication related questions after clinic hours, or on the weekend, please call [phone].     Financial Assistance [phone]   Medical Records [phone]       MENTAL HEALTH CRISIS RESOURCES:  For a emergency help, please call 911 or go to the nearest Emergency Department.     Emergency Walk-In Options:   EmPATH Unit @ [facility] ([place]): [phone] - Specialized mental health emergency area designed to be calming  [facility] ([place]): [phone]  [facility] ([place]): [phone]  [facility]): [phone]    [facility] Crisis Information:   [place]: [phone]  [place]: [phone]  [place] ([place]) - Adult: [phone]     Child: [phone]  [place] - Adult: [phone]     Child: [phone]  [place]:  [phone]  List of all [facility] resources:   https://[place].gov/dhs/people-we-serve/adults/health-care/mental-health/resources/crisis-contacts.jsp    National Crisis Information:   Crisis Text Line: Text  [place]  to 983799  Suicide & Crisis Lifeline: 988  National Suicide Prevention Lifeline: 9-320-872-TALK (1-511.565.3200)       For online chat options, visit https://suicidepreventionlifeline.org/chat/  Poison Control Center: 3-988-318-4785  Trans Lifeline: 4-199-476-1931 - Hotline for transgender people of all ages  The Dario Project: 2-761-354-5359 - Hotline for LGBT youth     For Non-Emergency Support:   Fast Tracker: Mental Health & Substance Use Disorder Resources -   https://www.Wifi.comckRepplern.org/
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
## 2024-08-07 ENCOUNTER — MYC MEDICAL ADVICE (OUTPATIENT)
Dept: PSYCHIATRY | Facility: CLINIC | Age: 59
End: 2024-08-07
Payer: COMMERCIAL

## 2024-08-07 DIAGNOSIS — F90.9 ATTENTION DEFICIT HYPERACTIVITY DISORDER (ADHD), UNSPECIFIED ADHD TYPE: ICD-10-CM

## 2024-08-07 RX ORDER — METHYLPHENIDATE HYDROCHLORIDE EXTENDED RELEASE 10 MG/1
10 TABLET ORAL 3 TIMES DAILY PRN
Qty: 90 TABLET | Refills: 0 | Status: SHIPPED | OUTPATIENT
Start: 2024-08-07 | End: 2024-09-09

## 2024-08-07 NOTE — TELEPHONE ENCOUNTER
- Meds refilled by provider   - Med tab changed to reflect this   - Patient notified via [employer]     Reached out to [facility] pharmacy and spoke with pharmacist, who agreed to discontinue Metadate 10 mg rx sent with start date of 8/5.     No further action needed by this writer

## 2024-09-09 ENCOUNTER — VIRTUAL VISIT (OUTPATIENT)
Dept: PSYCHIATRY | Facility: CLINIC | Age: 59
End: 2024-09-09
Attending: NURSE PRACTITIONER
Payer: COMMERCIAL

## 2024-09-09 VITALS — BODY MASS INDEX: 31.84 KG/M2 | HEIGHT: 69 IN | WEIGHT: 215 LBS

## 2024-09-09 DIAGNOSIS — F90.9 ATTENTION DEFICIT HYPERACTIVITY DISORDER (ADHD), UNSPECIFIED ADHD TYPE: ICD-10-CM

## 2024-09-09 DIAGNOSIS — F33.0 MAJOR DEPRESSIVE DISORDER, RECURRENT EPISODE, MILD (H): ICD-10-CM

## 2024-09-09 PROCEDURE — 99214 OFFICE O/P EST MOD 30 MIN: CPT | Mod: 95 | Performed by: NURSE PRACTITIONER

## 2024-09-09 RX ORDER — GABAPENTIN 100 MG/1
CAPSULE ORAL
Qty: 180 CAPSULE | Refills: 2 | Status: SHIPPED | OUTPATIENT
Start: 2024-10-02

## 2024-09-09 RX ORDER — METHYLPHENIDATE HYDROCHLORIDE EXTENDED RELEASE 10 MG/1
TABLET ORAL
Qty: 90 TABLET | Refills: 0 | Status: SHIPPED | OUTPATIENT
Start: 2024-09-09

## 2024-09-09 RX ORDER — METHYLPHENIDATE HYDROCHLORIDE EXTENDED RELEASE 10 MG/1
10 TABLET ORAL 3 TIMES DAILY PRN
Qty: 90 TABLET | Refills: 0 | Status: SHIPPED | OUTPATIENT
Start: 2024-10-07

## 2024-09-09 RX ORDER — ESCITALOPRAM OXALATE 20 MG/1
20 TABLET ORAL DAILY
Qty: 90 TABLET | Refills: 0 | Status: SHIPPED | OUTPATIENT
Start: 2024-09-09

## 2024-09-09 RX ORDER — METHYLPHENIDATE HYDROCHLORIDE EXTENDED RELEASE 10 MG/1
10 TABLET ORAL 3 TIMES DAILY PRN
Qty: 90 TABLET | Refills: 0 | Status: SHIPPED | OUTPATIENT
Start: 2024-11-04

## 2024-09-09 ASSESSMENT — PATIENT HEALTH QUESTIONNAIRE - PHQ9
SUM OF ALL RESPONSES TO PHQ QUESTIONS 1-9: 2
10. IF YOU CHECKED OFF ANY PROBLEMS, HOW DIFFICULT HAVE THESE PROBLEMS MADE IT FOR YOU TO DO YOUR WORK, TAKE CARE OF THINGS AT HOME, OR GET ALONG WITH OTHER PEOPLE: SOMEWHAT DIFFICULT
SUM OF ALL RESPONSES TO PHQ QUESTIONS 1-9: 2

## 2024-09-09 ASSESSMENT — PAIN SCALES - GENERAL: PAINLEVEL: MODERATE PAIN (5)

## 2024-09-09 NOTE — PATIENT INSTRUCTIONS
**For crisis resources, please see the information at the end of this document**   Patient Education    Thank you for coming to the [facility] [place] [facility].     Lab Testing:  If you had lab testing today and your results are reassuring or normal they will be mailed to you or sent through [employer] within 7 days. If the lab tests need quick action we will call you with the results. The phone number we will call with results is # [phone]. If this is not the best number please call our clinic and change the number.     Medication Refills:  If you need any refills please call your pharmacy and they will contact us. Our fax number for refills is [phone].   Three business days of notice are needed for general medication refill requests.   Five business days of notice are needed for controlled substance refill requests.   If you need to change to a different pharmacy, please contact the new pharmacy directly. The new pharmacy will help you get your medications transferred.     Contact Us:  Please call [phone] during business hours (8-5:00 M-F).   If you have medication related questions after clinic hours, or on the weekend, please call [phone].     Financial Assistance [phone]   Medical Records [phone]       MENTAL HEALTH CRISIS RESOURCES:  For a emergency help, please call 911 or go to the nearest Emergency Department.     Emergency Walk-In Options:   EmPATH Unit @ [facility] ([place]): [phone] - Specialized mental health emergency area designed to be calming  [facility] ([place]): [phone]  [facility] ([place]): [phone]  [facility] [place]): [phone]    [facility] Crisis Information:   [place]: [phone]  [place]: [phone]  [place] ([place]) - Adult: [phone]     Child: [phone]  [place] - Adult: [phone]     Child: [phone]  [place]:  [phone]  List of all [facility] resources:   https://[place].gov/dhs/people-we-serve/adults/health-care/mental-health/resources/crisis-contacts.jsp    National Crisis Information:   Crisis Text Line: Text  [place]  to 532377  Suicide & Crisis Lifeline: 988  National Suicide Prevention Lifeline: 9-504-600-TALK (1-833.532.6801)       For online chat options, visit https://suicidepreventionlifeline.org/chat/  Poison Control Center: 7-940-572-7929  Trans Lifeline: 5-419-643-8436 - Hotline for transgender people of all ages  The Dario Project: 8-094-525-2050 - Hotline for LGBT youth     For Non-Emergency Support:   Fast Tracker: Mental Health & Substance Use Disorder Resources -   https://www."[employer].org/
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
## 2024-09-16 ENCOUNTER — VIRTUAL VISIT (OUTPATIENT)
Dept: PSYCHOLOGY | Facility: CLINIC | Age: 59
End: 2024-09-16
Payer: COMMERCIAL

## 2024-09-16 DIAGNOSIS — F90.9 ATTENTION DEFICIT HYPERACTIVITY DISORDER (ADHD), UNSPECIFIED ADHD TYPE: ICD-10-CM

## 2024-09-16 DIAGNOSIS — F43.10 POST TRAUMATIC STRESS DISORDER: Primary | ICD-10-CM

## 2024-09-16 DIAGNOSIS — F33.42 MAJOR DEPRESSIVE DISORDER, RECURRENT EPISODE, IN FULL REMISSION (H): ICD-10-CM

## 2024-09-16 PROCEDURE — 90837 PSYTX W PT 60 MINUTES: CPT | Mod: 95 | Performed by: SOCIAL WORKER

## 2024-09-16 NOTE — PROGRESS NOTES
Progress Note - Visit    Client Name:  Jyothi Pierre Date: 4.7.21         Service Type: Individual     Visit Start Time: 9am  Visit End Time: 1004 am    Time: 64 minutes    Visit #: 4    Attendees: Client attended alone    Service Modality:  Video Visit:      Provider verified identity through the following two step process.  Patient provided:  Patient photo    Telemedicine Visit: The patient's condition can be safely assessed and treated via synchronous audio and visual telemedicine encounter.      Reason for Telemedicine Visit: Patient has requested telehealth visit    Originating Site (Patient Location): Patient's home    Distant Site (Provider Location): Provider Remote Setting- Home Office    Consent:  The patient/guardian has verbally consented to: the potential risks and benefits of telemedicine (video visit) versus in person care; bill my insurance or make self-payment for services provided; and responsibility for payment of non-covered services.     Patient would like the video invitation sent by:  My Chart    Mode of Communication:  Video Conference via [employer]    As the provider I attest to compliance with applicable laws and regulations related to telemedicine.       DATA:  Extended Session (53+ minutes): PROLONGED SERVICE IN THE OUTPATIENT SETTING REQUIRING DIRECT (FACE-TO-FACE) PATIENT CONTACT BEYOND THE USUAL SERVICE:    - Longer session due to limited access to mental health appointments and necessity to address patient's distress / complexity   Interactive Complexity: No   Crisis: No     Presenting Concerns/  Current Stressors: Discussed concept of ACT toward moves, action that align with values. Recognized and celebrated her courage in being willing to take action. We discussed the core value of genuineness, processed how she wants to see herself move closer to this. Discussed the largest stressor is with parents who she has told will receive scrutiny, discussed rexperiencing  "trauma. She notices when \"I am mistreated I try to befriend.\" She described 1.5 years ago noticing after reconnecting with father noticed dissociation increasing, losing time, drifting off in conversation. She reflected on when choosing to end this connection there was increased grieving. Processed her experience of her being able to choose to end the connection with her father, processed her htoughts, feelings, and reactions with this. Helped pt realize \"I was looking for my preference to communicate at a far distance and he was unable to accommodate that.\"      Treatment Objective(s) Addressed in This Session:      Assessing for safety      Intervention:   Safety planning                                               Jyothi Pierre     SAFETY PLAN:  Step 1: Warning signs / cues (Thoughts, images, mood, situation, behavior) that a crisis may be developing:  Thoughts: \"I don't matter\", \"People would be better off without me\" and \"I'm a burden\"  Images:  thinking about how mother said I failed my sister  Thinking Processes: racing thoughts and highly critical and negative thoughts: I'm not of value to others  Mood: worsening depression  Behaviors:  giving items away  Situations:  others making fun of hearing loss    Step 2: Coping strategies - Things I can do to take my mind off of my problems without contacting another person (relaxation technique, physical activity):  Distress Tolerance Strategies:  paced breathing/progressive muscle relaxation and grounding skills  Physical Activities: go for a walk  Focus on helpful thoughts:  self-compassion statements: I am creating new neruopathways, suicidal thoughts is an older pathway, I will commit to making new ones  Step 3: People and social settings that provide distraction:   Name:    nature    Step 4: Remind myself of people and things that are important to me and worth living for:  I want to model for my kids, I want to continue to be there for other kids in " classroom, nature is important to me  Step 5: When I am in crisis, I can ask these people to help me use my safety plan:   Name:    Step 6: Making the environment safe:   secure medications: have  secure medications and dispose of old medications   Step 7: Professionals or agencies I can contact during a crisis:  [facility] Daytime Number: [phone]  Suicide Prevention Lifeline: 2-721-484-RRUO (8622)  Crisis Text Line Service (available 24 hours a day, 7 days a week): Text [place] to 870858  Local Crisis Services:     Call 911 or go to my nearest emergency department.   I helped develop this safety plan and agree to use it when needed.  I have been given a copy of this plan.      Client signature _________________________________________________________________  Today s date:  5/5/2021  Adapted from Safety Plan Template 2008 Lori Mercado and Juan Luis Vela is reprinted with the express permission of the authors.  No portion of the Safety Plan Template may be reproduced without the express, written permission.  You can contact the authors at [email or web address] or [email or web address].      ASSESSMENT:  Mental Status Assessment:  Appearance:   Appropriate   Eye Contact:   Good   Psychomotor Behavior: Normal   Attitude:   Cooperative   Orientation:   All  Speech   Rate / Production: Normal/ Responsive   Volume:  Normal   Mood:    Anxious   Affect:    Appropriate   Thought Content:  Clear   Thought Form:  Coherent   Insight:    Fair       Safety Issues and Plan for Safety and Risk Management:     [place] Suicide Severity Rating Scale (Lifetime/Recent)      6/30/2012     3:00 AM 2/15/2021     9:00 AM 5/5/2021     9:00 AM   [place] Suicide Severity Rating (Lifetime/Recent)   Q1 Wish to be Dead (Lifetime)  Yes Yes   Most Severe Ideation Rating (Lifetime)  3    Frequency (Lifetime)  3    Duration (Lifetime)  3    Controllability (Lifetime)  2    Protective Factors  (Lifetime)  2     Reasons for Ideation (Lifetime)  5    Do you have guns available to you? No     RETIRED: 1. Wish to be Dead (Recent)  No Yes   RETIRED: Wish to be Dead Description (Recent)   Thought of not wanting to be alive   Most Severe Ideation Rating (Past Month)  NA 2   Frequency (Past Month)  NA 2   Duration (Past Month)  NA 2   Controllability (Past Month)  NA 1   Protective Factors (Past Month)  NA 1   Reasons for Ideation (Past Month)  NA 5   Actual Attempt (Lifetime)  No Yes   Actual Attempt Description (Lifetime)   Overdose on medications 5 yeas ago     Patient denies current fears or concerns for personal safety.  Patient denies current or recent suicidal ideation or behaviors.  Patient denies current or recent homicidal ideation or behaviors.  Patient denies current or recent self injurious behavior or ideation.  Patient denies other safety concerns.  Recommended that patient call 911 or go to the local ED should there be a change in any of these risk factors.  Patient reports there are no firearms in the house.     Diagnostic Criteria:  CRITERIA (A-C) REPRESENT A MAJOR DEPRESSIVE EPISODE - SELECT THESE CRITERIA  A) Recurrent episode(s) - symptoms have been present during the same 2-week period and represent a change from previous functioning 5 or more symptoms (required for diagnosis)   - Depressed mood. Note: In children and adolescents, can be irritable mood.     - Diminished interest or pleasure in all, or almost all, activities.    - Decreased sleep.    - Psychomotor activity agitation.    - Fatigue or loss of energy.    - Feelings of worthlessness or inappropriate and excessive guilt.    - Diminished ability to think or concentrate, or indecisiveness.   B) The symptoms cause clinically significant distress or impairment in social, occupational, or other important areas of functioning  C) The episode is not attributable to the physiological effects of a substance or to another medical condition  D) The  occurence of major depressive episode is not better explained by other thought / psychotic disorders  E) There has never been a manic episode or hypomanic episode  A. The person has been exposed to a traumatic event in which both of the following were present:     (1) the person experienced, witnessed, or was confronted with an event or events that involved actual or threatened death or serious injury, or a threat to the physical integrity of self or others     (2) the person's response involved intense fear, helplessness, or horror. Note: In children, this may be expressed instead by disorganized or agitated behavior  B. The traumatic event is persistently reexperienced in one (or more) of the following ways:     - Recurrent and intrusive distressing recollections of the event, including images, thoughts, or perceptions. Note: In young children, repetitive play may occur in which themes or aspects of the trauma are expressed.      - Recurrent distressing dreams of the event. Note: In children, there may be frightening dreams without recognizable content.      - Intense psychological distress at exposure to internal or external cues that symbolize or resemble an aspect of the traumatic event.      - Physiological reactivity on exposure to internal or external cues that symbolize or resemble an aspect of the traumatic event.   C. Persistent avoidance of stimuli associated with the trauma and numbing of general responsiveness (not present before the trauma), as indicated by three (or more) of the following:     - Efforts to avoid thoughts, feelings, or conversations associated with the trauma.      - Efforts to avoid activities, places, or people that arouse recollections of the trauma.      - Feeling of detachment or estrangement from others.   D. Persistent symptoms of increased arousal (not present before the trauma), as indicated by two (or more) of the following:     - Difficulty falling or staying asleep.      -  Difficulty concentrating.      - Hypervigilance.      - Exaggerated startle response.   E. Duration of the disturbance is more than 1 month.  F. The disturbance causes clinically significant distress or impairment in social, occupational, or other important areas of functioning.      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      Cesar Magaña, ALYSON, [facility], Cesar Magaña on 9/16/2024 at 7:50 AM                                                       Treatment Plan    Client's Name: Jyothi Pierre  YOB: 1965    Date: 4.7.21    Diagnoses: 296.31 (F33.0) Major Depressive Disorder, Recurrent Episode, Mild With anxious distress 309.81 (F43.10) Posttraumatic Stress Disorder (includes Posttraumatic Stress Disorder for Children 6 Years and Younger)  With dissociative symptoms    Referral / Collaboration:  Referral to another professional/service is not indicated at this time.    Anticipated number of session or this episode of care: 13-15      MeasurableTreatment Goal(s) related to diagnosis / functional impairment(s)    Goal 2: Client will increase her understanding and comprehension of PTSD.     I will know I've met my goal when I am better able to tolerate a mix of emotions.       Objective #A (Client Action)                Status: New - Date: 4.7.21   =  Client will increasing her understanding of PTSD.     Intervention(s)  Therapist will provide educational materials  on PTSD, including providing vocabulary to describe feelings associated with PTSD.     Objective #B  Client will Identify cues and symptoms related to PTSD.                    Status: New - Date: 4.7.21     Intervention(s)  Therapist will provide educational materials on PTSD cues and symptoms.  teach the client how to perform a behavioral chain analysis.     Patient has reviewed and agreed to the above plan.      ALYSON Alejandro, LGSW April 7, 2021

## 2024-10-18 ENCOUNTER — VIRTUAL VISIT (OUTPATIENT)
Dept: PSYCHOLOGY | Facility: CLINIC | Age: 59
End: 2024-10-18
Payer: COMMERCIAL

## 2024-10-18 DIAGNOSIS — F33.42 MAJOR DEPRESSIVE DISORDER, RECURRENT EPISODE, IN FULL REMISSION (H): ICD-10-CM

## 2024-10-18 DIAGNOSIS — F43.10 POST TRAUMATIC STRESS DISORDER: Primary | ICD-10-CM

## 2024-10-18 PROCEDURE — 90834 PSYTX W PT 45 MINUTES: CPT | Mod: 95 | Performed by: SOCIAL WORKER

## 2024-10-18 ASSESSMENT — ANXIETY QUESTIONNAIRES
5. BEING SO RESTLESS THAT IT IS HARD TO SIT STILL: NOT AT ALL
GAD7 TOTAL SCORE: 7
3. WORRYING TOO MUCH ABOUT DIFFERENT THINGS: NEARLY EVERY DAY
GAD7 TOTAL SCORE: 7
7. FEELING AFRAID AS IF SOMETHING AWFUL MIGHT HAPPEN: SEVERAL DAYS
4. TROUBLE RELAXING: SEVERAL DAYS
8. IF YOU CHECKED OFF ANY PROBLEMS, HOW DIFFICULT HAVE THESE MADE IT FOR YOU TO DO YOUR WORK, TAKE CARE OF THINGS AT HOME, OR GET ALONG WITH OTHER PEOPLE?: SOMEWHAT DIFFICULT
IF YOU CHECKED OFF ANY PROBLEMS ON THIS QUESTIONNAIRE, HOW DIFFICULT HAVE THESE PROBLEMS MADE IT FOR YOU TO DO YOUR WORK, TAKE CARE OF THINGS AT HOME, OR GET ALONG WITH OTHER PEOPLE: SOMEWHAT DIFFICULT
IF YOU CHECKED OFF ANY PROBLEMS ON THIS QUESTIONNAIRE, HOW DIFFICULT HAVE THESE PROBLEMS MADE IT FOR YOU TO DO YOUR WORK, TAKE CARE OF THINGS AT HOME, OR GET ALONG WITH OTHER PEOPLE: SOMEWHAT DIFFICULT
GAD7 TOTAL SCORE: 7
3. WORRYING TOO MUCH ABOUT DIFFERENT THINGS: NEARLY EVERY DAY
5. BEING SO RESTLESS THAT IT IS HARD TO SIT STILL: NOT AT ALL
1. FEELING NERVOUS, ANXIOUS, OR ON EDGE: SEVERAL DAYS
2. NOT BEING ABLE TO STOP OR CONTROL WORRYING: SEVERAL DAYS
GAD7 TOTAL SCORE: 7
7. FEELING AFRAID AS IF SOMETHING AWFUL MIGHT HAPPEN: SEVERAL DAYS
8. IF YOU CHECKED OFF ANY PROBLEMS, HOW DIFFICULT HAVE THESE MADE IT FOR YOU TO DO YOUR WORK, TAKE CARE OF THINGS AT HOME, OR GET ALONG WITH OTHER PEOPLE?: SOMEWHAT DIFFICULT
4. TROUBLE RELAXING: SEVERAL DAYS
7. FEELING AFRAID AS IF SOMETHING AWFUL MIGHT HAPPEN: SEVERAL DAYS
6. BECOMING EASILY ANNOYED OR IRRITABLE: NOT AT ALL
6. BECOMING EASILY ANNOYED OR IRRITABLE: NOT AT ALL
GAD7 TOTAL SCORE: 7
1. FEELING NERVOUS, ANXIOUS, OR ON EDGE: SEVERAL DAYS
2. NOT BEING ABLE TO STOP OR CONTROL WORRYING: SEVERAL DAYS

## 2024-10-18 ASSESSMENT — PATIENT HEALTH QUESTIONNAIRE - PHQ9
SUM OF ALL RESPONSES TO PHQ QUESTIONS 1-9: 8
SUM OF ALL RESPONSES TO PHQ QUESTIONS 1-9: 8
10. IF YOU CHECKED OFF ANY PROBLEMS, HOW DIFFICULT HAVE THESE PROBLEMS MADE IT FOR YOU TO DO YOUR WORK, TAKE CARE OF THINGS AT HOME, OR GET ALONG WITH OTHER PEOPLE: SOMEWHAT DIFFICULT
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
## 2024-10-22 NOTE — PROGRESS NOTES
Answers submitted by the patient for this visit:  Patient Health Questionnaire (G7) (Submitted on 10/18/2024)  DILIA 7 TOTAL SCORE: 7  Answers submitted by the patient for this visit:  Patient Health Questionnaire (Submitted on 10/18/2024)  If you checked off any problems, how difficult have these problems made it for you to do your work, take care of things at home, or get along with other people?: Somewhat difficult  PHQ9 TOTAL SCORE: 8  Patient Health Questionnaire (G7) (Submitted on 10/18/2024)  DILIA 7 TOTAL SCORE: 7                 Progress Note - Visit    Client Name:  Jyothi Pierre Date: 10/24/24           Service Type: Individual     Visit Start Time: 330pm  Visit End Time: 430pm    Time: 60 minutes    Visit #: 7    Attendees: Client attended alone    Service Modality:  Video Visit:      Provider verified identity through the following two step process.  Patient provided:  Patient photo    Telemedicine Visit: The patient's condition can be safely assessed and treated via synchronous audio and visual telemedicine encounter.      Reason for Telemedicine Visit: Patient has requested telehealth visit    Originating Site (Patient Location): Patient's home    Distant Site (Provider Location): Provider Remote Setting- Home Office    Consent:  The patient/guardian has verbally consented to: the potential risks and benefits of telemedicine (video visit) versus in person care; bill my insurance or make self-payment for services provided; and responsibility for payment of non-covered services.     Patient would like the video invitation sent by:  My Chart    Mode of Communication:  Video Conference via [facility]    As the provider I attest to compliance with applicable laws and regulations related to telemedicine.       DATA:  Extended Session (53+ minutes): PROLONGED SERVICE IN THE OUTPATIENT SETTING REQUIRING DIRECT (FACE-TO-FACE) PATIENT CONTACT BEYOND THE USUAL SERVICE:    - Longer session due to limited access to  "mental health appointments and necessity to address patient's distress / complexity   Interactive Complexity: No   Crisis: No     Presenting Concerns/  Current Stressors: Processed pt's experience in managing medical conditions, processed her thoughts, feelings, and reactions. Discussed reminders of childhood, provided emotional validation, emotional identification, and compassion. Processed early childhood memories, specifically belief parents held about ability to heal. For the remainder of the session, utilized trauma informed skills and emphasized the moment felt safe to use assertion, confront, and honor her values.      Treatment Objective(s) Addressed in This Session:      Assessing for safety      Intervention:   Safety planning                                               Jyothi Pierre     SAFETY PLAN:  Step 1: Warning signs / cues (Thoughts, images, mood, situation, behavior) that a crisis may be developing:  Thoughts: \"I don't matter\", \"People would be better off without me\" and \"I'm a burden\"  Images:  thinking about how mother said I failed my sister  Thinking Processes: racing thoughts and highly critical and negative thoughts: I'm not of value to others  Mood: worsening depression  Behaviors:  giving items away  Situations:  others making fun of hearing loss    Step 2: Coping strategies - Things I can do to take my mind off of my problems without contacting another person (relaxation technique, physical activity):  Distress Tolerance Strategies:  paced breathing/progressive muscle relaxation and grounding skills  Physical Activities: go for a walk  Focus on helpful thoughts:  self-compassion statements: I am creating new neruopathways, suicidal thoughts is an older pathway, I will commit to making new ones  Step 3: People and social settings that provide distraction:   Name:    nature    Step 4: Remind myself of people and things that are important to me and worth living for:  I want to " "model for my kids, 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   I helped develop this safety plan and agree to use it when needed.  I have been given a copy of this plan.      Client signature _________________________________________________________________  Today s date:  2021  Adapted from Safety Plan Template 2008 Lori Mercado and Juan Luis Vela is reprinted with the express permission of the authors.  No portion of the Safety Plan Template may be reproduced without the express, written permission.  You can contact the authors at [email or web address] or [email or web address].  Interventions   EMDR  ROYGBIV (Front to back of head)  Not deserving to be here --- responsibility clinical theme   Age 3, came home from [place], looked out window, thought self how do I die, 1, not wanting to be on earth anymore  Age 28 Memory: asking mother when sister  \"would you have wanted it to be me\", mother did not answer  Currently believing --- it is nothing that I did, parents lack of skills -- adult self knows     ASSESSMENT:  Mental Status Assessment:  Appearance:   Appropriate   Eye Contact:   Good   Psychomotor Behavior: Normal   Attitude:   Cooperative   Orientation:   All  Speech   Rate / Production: Normal/ Responsive   Volume:  Normal   Mood:    Anxious   Affect:    Appropriate   Thought Content:  Clear "   Thought Form:  Coherent   Insight:    Fair       Safety Issues and Plan for Safety and Risk Management:     [place] Suicide Severity Rating Scale (Lifetime/Recent)      6/30/2012     3:00 AM 2/15/2021     9:00 AM 5/5/2021     9:00 AM   [place] Suicide Severity Rating (Lifetime/Recent)   Q1 Wish to be Dead (Lifetime)  Yes Yes   Most Severe Ideation Rating (Lifetime)  3    Frequency (Lifetime)  3    Duration (Lifetime)  3    Controllability (Lifetime)  2    Protective Factors  (Lifetime)  2    Reasons for Ideation (Lifetime)  5    Do you have guns available to you? No     RETIRED: 1. Wish to be Dead (Recent)  No Yes   RETIRED: Wish to be Dead Description (Recent)   Thought of not wanting to be alive   Most Severe Ideation Rating (Past Month)  NA 2   Frequency (Past Month)  NA 2   Duration (Past Month)  NA 2   Controllability (Past Month)  NA 1   Protective Factors (Past Month)  NA 1   Reasons for Ideation (Past Month)  NA 5   Actual Attempt (Lifetime)  No Yes   Actual Attempt Description (Lifetime)   Overdose on medications 5 yeas ago     Patient denies current fears or concerns for personal safety.  Patient denies current or recent suicidal ideation or behaviors.  Patient denies current or recent homicidal ideation or behaviors.  Patient denies current or recent self injurious behavior or ideation.  Patient denies other safety concerns.  Recommended that patient call 911 or go to the local ED should there be a change in any of these risk factors.  Patient reports there are no firearms in the house.     Diagnostic Criteria:  CRITERIA (A-C) REPRESENT A MAJOR DEPRESSIVE EPISODE - SELECT THESE CRITERIA  A) Recurrent episode(s) - symptoms have been present during the same 2-week period and represent a change from previous functioning 5 or more symptoms (required for diagnosis)   - Depressed mood. Note: In children and adolescents, can be irritable mood.     - Diminished interest or pleasure in all, or almost all,  activities.    - Decreased sleep.    - Psychomotor activity agitation.    - Fatigue or loss of energy.    - Feelings of worthlessness or inappropriate and excessive guilt.    - Diminished ability to think or concentrate, or indecisiveness.   B) The symptoms cause clinically significant distress or impairment in social, occupational, or other important areas of functioning  C) The episode is not attributable to the physiological effects of a substance or to another medical condition  D) The occurence of major depressive episode is not better explained by other thought / psychotic disorders  E) There has never been a manic episode or hypomanic episode  A. The person has been exposed to a traumatic event in which both of the following were present:     (1) the person experienced, witnessed, or was confronted with an event or events that involved actual or threatened death or serious injury, or a threat to the physical integrity of self or others     (2) the person's response involved intense fear, helplessness, or horror. Note: In children, this may be expressed instead by disorganized or agitated behavior  B. The traumatic event is persistently reexperienced in one (or more) of the following ways:     - Recurrent and intrusive distressing recollections of the event, including images, thoughts, or perceptions. Note: In young children, repetitive play may occur in which themes or aspects of the trauma are expressed.      - Recurrent distressing dreams of the event. Note: In children, there may be frightening dreams without recognizable content.      - Intense psychological distress at exposure to internal or external cues that symbolize or resemble an aspect of the traumatic event.      - Physiological reactivity on exposure to internal or external cues that symbolize or resemble an aspect of the traumatic event.   C. Persistent avoidance of stimuli associated with the trauma and numbing of general responsiveness (not  present before the trauma), as indicated by three (or more) of the following:     - Efforts to avoid thoughts, feelings, or conversations associated with the trauma.      - Efforts to avoid activities, places, or people that arouse recollections of the trauma.      - Feeling of detachment or estrangement from others.   D. Persistent symptoms of increased arousal (not present before the trauma), as indicated by two (or more) of the following:     - Difficulty falling or staying asleep.      - Difficulty concentrating.      - Hypervigilance.      - Exaggerated startle response.   E. Duration of the disturbance is more than 1 month.  F. The disturbance causes clinically significant distress or impairment in social, occupational, or other important areas of functioning.      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      Cesar Magaña, ALYSON, [facility], Cesar Magaña 10/24/24                                                         Treatment Plan    Client's Name: Jyothi Pierre  YOB: 1965    Date: 4.7.21; 10.23.24    Diagnoses: 296.31 (F33.0) Major Depressive Disorder, Recurrent Episode, Mild With anxious distress 309.81 (F43.10) Posttraumatic Stress Disorder (includes Posttraumatic Stress Disorder for Children 6 Years and Younger)  With dissociative symptoms    Referral / Collaboration:  Referral to another professional/service is not indicated at this  time.    Anticipated number of session or this episode of care: 13-15      MeasurableTreatment Goal(s) related to diagnosis / functional impairment(s)    Goal 2: Client will increase her understanding and comprehension of PTSD.     I will know I've met my goal when I am better able to tolerate a mix of emotions.       Objective #A (Client Action)                Status: New - Date: 4.7.21; 10.23.24   =  Client will increasing her understanding of PTSD.     Intervention(s)  Therapist will provide educational materials on PTSD, including providing vocabulary to describe feelings associated with PTSD.     Objective #B  Client will Identify cues and symptoms related to PTSD.                    Status: New - Date: 4.7.21; 10.23.24     Intervention(s)  Therapist will provide educational materials on PTSD cues and symptoms.  teach the client how to perform a behavioral chain analysis.     Patient has reviewed and agreed to the above plan.      ALYSON Alejandro, [facility] April 7, 2021; 10.23.24

## 2024-10-24 ENCOUNTER — VIRTUAL VISIT (OUTPATIENT)
Dept: PSYCHOLOGY | Facility: CLINIC | Age: 59
End: 2024-10-24
Payer: COMMERCIAL

## 2024-10-24 DIAGNOSIS — F43.10 POST TRAUMATIC STRESS DISORDER: Primary | ICD-10-CM

## 2024-10-24 DIAGNOSIS — F33.42 MAJOR DEPRESSIVE DISORDER, RECURRENT EPISODE, IN FULL REMISSION (H): ICD-10-CM

## 2024-10-24 PROCEDURE — 90837 PSYTX W PT 60 MINUTES: CPT | Mod: 95 | Performed by: SOCIAL WORKER

## 2024-10-24 ASSESSMENT — PATIENT HEALTH QUESTIONNAIRE - PHQ9
SUM OF ALL RESPONSES TO PHQ QUESTIONS 1-9: 1
SUM OF ALL RESPONSES TO PHQ QUESTIONS 1-9: 1
10. IF YOU CHECKED OFF ANY PROBLEMS, HOW DIFFICULT HAVE THESE PROBLEMS MADE IT FOR YOU TO DO YOUR WORK, TAKE CARE OF THINGS AT HOME, OR GET ALONG WITH OTHER PEOPLE: NOT DIFFICULT AT ALL

## 2024-10-30 ENCOUNTER — VIRTUAL VISIT (OUTPATIENT)
Dept: PSYCHOLOGY | Facility: CLINIC | Age: 59
End: 2024-10-30
Payer: COMMERCIAL

## 2024-10-30 DIAGNOSIS — F43.10 POST TRAUMATIC STRESS DISORDER: Primary | ICD-10-CM

## 2024-10-30 PROCEDURE — 90837 PSYTX W PT 60 MINUTES: CPT | Mod: 95 | Performed by: SOCIAL WORKER

## 2024-10-30 ASSESSMENT — PATIENT HEALTH QUESTIONNAIRE - PHQ9
SUM OF ALL RESPONSES TO PHQ QUESTIONS 1-9: 4
SUM OF ALL RESPONSES TO PHQ QUESTIONS 1-9: 4
10. IF YOU CHECKED OFF ANY PROBLEMS, HOW DIFFICULT HAVE THESE PROBLEMS MADE IT FOR YOU TO DO YOUR WORK, TAKE CARE OF THINGS AT HOME, OR GET ALONG WITH OTHER PEOPLE: NOT DIFFICULT AT ALL

## 2024-10-30 NOTE — PROGRESS NOTES
Answers submitted by the patient for this visit:  Patient Health Questionnaire (G7) (Submitted on 10/18/2024)  DILIA 7 TOTAL SCORE: 7  Answers submitted by the patient for this visit:  Patient Health Questionnaire (Submitted on 10/18/2024)  If you checked off any problems, how difficult have these problems made it for you to do your work, take care of things at home, or get along with other people?: Somewhat difficult  PHQ9 TOTAL SCORE: 8  Patient Health Questionnaire (G7) (Submitted on 10/18/2024)  DILIA 7 TOTAL SCORE: 7                 Progress Note - Visit    Client Name:  Jyothi Pierre Date: 10/30/24           Service Type: Individual     Visit Start Time: 330pm  Visit End Time: 430pm    Time: 60 minutes    Visit #: 7    Attendees: Client attended alone    Service Modality:  Video Visit:      Provider verified identity through the following two step process.  Patient provided:  Patient photo    Telemedicine Visit: The patient's condition can be safely assessed and treated via synchronous audio and visual telemedicine encounter.      Reason for Telemedicine Visit: Patient has requested telehealth visit    Originating Site (Patient Location): Patient's home    Distant Site (Provider Location): Provider Remote Setting- Home Office    Consent:  The patient/guardian has verbally consented to: the potential risks and benefits of telemedicine (video visit) versus in person care; bill my insurance or make self-payment for services provided; and responsibility for payment of non-covered services.     Patient would like the video invitation sent by:  My Chart    Mode of Communication:  Video Conference via [facility]    As the provider I attest to compliance with applicable laws and regulations related to telemedicine.       DATA:  Extended Session (53+ minutes): PROLONGED SERVICE IN THE OUTPATIENT SETTING REQUIRING DIRECT (FACE-TO-FACE) PATIENT CONTACT BEYOND THE USUAL SERVICE:    - Longer session due to limited access to  "mental health appointments and necessity to address patient's distress / complexity   Interactive Complexity: No   Crisis: No     Presenting Concerns/  Current Stressors: Processed pt's experience with a dream, held space for pt's thoughts, feelings, and reactions. She described waking up with a sense of tenses. Reflected on how she is following the rules. Discussed experience when younger he heard his mother tell his father what she had done there was inevitability that pain was going come. For remainder of hte session, collected history for stage 1 emdr, also practiced preparation.    Resource -- courage - in present day, defied to follow rule, was able to participate in it today.    Resource- When I confronted and stood up for myself --- When I confronted others    Treatment Objective(s) Addressed in This Session:      Assessing for safety      Intervention:   Safety planning                                               Jyothi Pierre     SAFETY PLAN:  Step 1: Warning signs / cues (Thoughts, images, mood, situation, behavior) that a crisis may be developing:  Thoughts: \"I don't matter\", \"People would be better off without me\" and \"I'm a burden\"  Images:  thinking about how mother said I failed my sister  Thinking Processes: racing thoughts and highly critical and negative thoughts: I'm not of value to others  Mood: worsening depression  Behaviors:  giving items away  Situations:  others making fun of hearing loss    Step 2: Coping strategies - Things I can do to take my mind off of my problems without contacting another person (relaxation technique, physical activity):  Distress Tolerance Strategies:  paced breathing/progressive muscle relaxation and grounding skills  Physical Activities: go for a walk  Focus on helpful thoughts:  self-compassion statements: I am creating new neruopathways, suicidal thoughts is an older pathway, I will commit to making new ones  Step 3: People and social settings that provide " "distraction:   Name:    nature    Step 4: Remind myself of people and things that are important to me and worth living for:  I want to model for my kids, 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   I helped develop this safety plan and agree to use it when needed.  I have been given a copy of this plan.      Client signature _________________________________________________________________  Today s date:  2021  Adapted from Safety Plan Template 2008 Lori Mercado and Juan Luis Vela is reprinted with the express permission of the authors.  No portion of the Safety Plan Template may be reproduced without the express, written permission.  You can contact the authors at [email or web address] or [email or web address].  Interventions   EMDR  ROYGBIV (Front to back of head)  Not deserving to be here --- responsibility clinical theme   Age 3, came home from [employer], looked out window, thought self how do I die, 1, not wanting to be on earth anymore  Age 28 Memory: asking mother when sister  \"would you have wanted it to be me\", mother did not answer  Currently believing --- it is nothing that I did, parents lack of skills -- adult self knows     ASSESSMENT:  Mental Status Assessment:  Appearance:   Appropriate   Eye Contact:   Good   Psychomotor Behavior: Normal   Attitude:   Cooperative "   Orientation:   All  Speech   Rate / Production: Normal/ Responsive   Volume:  Normal   Mood:    Anxious   Affect:    Appropriate   Thought Content:  Clear   Thought Form:  Coherent   Insight:    Fair       Safety Issues and Plan for Safety and Risk Management:     [place] Suicide Severity Rating Scale (Lifetime/Recent)      6/30/2012     3:00 AM 2/15/2021     9:00 AM 5/5/2021     9:00 AM   [place] Suicide Severity Rating (Lifetime/Recent)   Q1 Wish to be Dead (Lifetime)  Yes Yes   Most Severe Ideation Rating (Lifetime)  3    Frequency (Lifetime)  3    Duration (Lifetime)  3    Controllability (Lifetime)  2    Protective Factors  (Lifetime)  2    Reasons for Ideation (Lifetime)  5    Do you have guns available to you? No     RETIRED: 1. Wish to be Dead (Recent)  No Yes   RETIRED: Wish to be Dead Description (Recent)   Thought of not wanting to be alive   Most Severe Ideation Rating (Past Month)  NA 2   Frequency (Past Month)  NA 2   Duration (Past Month)  NA 2   Controllability (Past Month)  NA 1   Protective Factors (Past Month)  NA 1   Reasons for Ideation (Past Month)  NA 5   Actual Attempt (Lifetime)  No Yes   Actual Attempt Description (Lifetime)   Overdose on medications 5 yeas ago     Patient denies current fears or concerns for personal safety.  Patient denies current or recent suicidal ideation or behaviors.  Patient denies current or recent homicidal ideation or behaviors.  Patient denies current or recent self injurious behavior or ideation.  Patient denies other safety concerns.  Recommended that patient call 911 or go to the local ED should there be a change in any of these risk factors.  Patient reports there are no firearms in the house.     Diagnostic Criteria:  CRITERIA (A-C) REPRESENT A MAJOR DEPRESSIVE EPISODE - SELECT THESE CRITERIA  A) Recurrent episode(s) - symptoms have been present during the same 2-week period and represent a change from previous functioning 5 or more symptoms (required  for diagnosis)   - Depressed mood. Note: In children and adolescents, can be irritable mood.     - Diminished interest or pleasure in all, or almost all, activities.    - Decreased sleep.    - Psychomotor activity agitation.    - Fatigue or loss of energy.    - Feelings of worthlessness or inappropriate and excessive guilt.    - Diminished ability to think or concentrate, or indecisiveness.   B) The symptoms cause clinically significant distress or impairment in social, occupational, or other important areas of functioning  C) The episode is not attributable to the physiological effects of a substance or to another medical condition  D) The occurence of major depressive episode is not better explained by other thought / psychotic disorders  E) There has never been a manic episode or hypomanic episode  A. The person has been exposed to a traumatic event in which both of the following were present:     (1) the person experienced, witnessed, or was confronted with an event or events that involved actual or threatened death or serious injury, or a threat to the physical integrity of self or others     (2) the person's response involved intense fear, helplessness, or horror. Note: In children, this may be expressed instead by disorganized or agitated behavior  B. The traumatic event is persistently reexperienced in one (or more) of the following ways:     - Recurrent and intrusive distressing recollections of the event, including images, thoughts, or perceptions. Note: In young children, repetitive play may occur in which themes or aspects of the trauma are expressed.      - Recurrent distressing dreams of the event. Note: In children, there may be frightening dreams without recognizable content.      - Intense psychological distress at exposure to internal or external cues that symbolize or resemble an aspect of the traumatic event.      - Physiological reactivity on exposure to internal or external cues that symbolize  or resemble an aspect of the traumatic event.   C. Persistent avoidance of stimuli associated with the trauma and numbing of general responsiveness (not present before the trauma), as indicated by three (or more) of the following:     - Efforts to avoid thoughts, feelings, or conversations associated with the trauma.      - Efforts to avoid activities, places, or people that arouse recollections of the trauma.      - Feeling of detachment or estrangement from others.   D. Persistent symptoms of increased arousal (not present before the trauma), as indicated by two (or more) of the following:     - Difficulty falling or staying asleep.      - Difficulty concentrating.      - Hypervigilance.      - Exaggerated startle response.   E. Duration of the disturbance is more than 1 month.  F. The disturbance causes clinically significant distress or impairment in social, occupational, or other important areas of functioning.      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      Cesar Magaña, ALYSON, [facility], Cesar Magaña 10/30/24                                                         Treatment Plan    Client's Name: Jyothi Pierre  YOB: 1965    Date: 4.7.21; 10.23.24    Diagnoses: 296.31 (F33.0) Major Depressive Disorder, Recurrent Episode, Mild With anxious distress 309.81 (F43.10) Posttraumatic Stress Disorder (includes Posttraumatic Stress Disorder for Children 6  Years and Younger)  With dissociative symptoms    Referral / Collaboration:  Referral to another professional/service is not indicated at this time.    Anticipated number of session or this episode of care: 13-15      MeasurableTreatment Goal(s) related to diagnosis / functional impairment(s)    Goal 2: Client will increase her understanding and comprehension of PTSD.     I will know I've met my goal when I am better able to tolerate a mix of emotions.       Objective #A (Client Action)                Status: New - Date: 4.7.21; 10.23.24   =  Client will increasing her understanding of PTSD.     Intervention(s)  Therapist will provide educational materials on PTSD, including providing vocabulary to describe feelings associated with PTSD.     Objective #B  Client will Identify cues and symptoms related to PTSD.                    Status: New - Date: 4.7.21; 10.23.24     Intervention(s)  Therapist will provide educational materials on PTSD cues and symptoms.  teach the client how to perform a behavioral chain analysis.     Patient has reviewed and agreed to the above plan.      ALYSON Alejandro, LG April 7, 2021; 10.23.24

## 2024-11-20 ENCOUNTER — VIRTUAL VISIT (OUTPATIENT)
Dept: PSYCHOLOGY | Facility: CLINIC | Age: 59
End: 2024-11-20
Payer: COMMERCIAL

## 2024-11-20 DIAGNOSIS — F33.42 MAJOR DEPRESSIVE DISORDER, RECURRENT EPISODE, IN FULL REMISSION (H): ICD-10-CM

## 2024-11-20 DIAGNOSIS — F43.10 POST TRAUMATIC STRESS DISORDER: Primary | ICD-10-CM

## 2024-11-20 NOTE — PROGRESS NOTES
Answers submitted by the patient for this visit:  Patient Health Questionnaire (G7) (Submitted on 10/18/2024)  DILIA 7 TOTAL SCORE: 7  Answers submitted by the patient for this visit:  Patient Health Questionnaire (Submitted on 10/18/2024)  If you checked off any problems, how difficult have these problems made it for you to do your work, take care of things at home, or get along with other people?: Somewhat difficult  PHQ9 TOTAL SCORE: 8  Patient Health Questionnaire (G7) (Submitted on 10/18/2024)  DILIA 7 TOTAL SCORE: 7                 Progress Note - Visit    Client Name:  Jyothi Pierre Date: 11/20/24           Service Type: Individual     Visit Start Time: 330pm  Visit End Time: 430pm    Time: 60 minutes    Visit #: 7    Attendees: Client attended alone    Service Modality:  Video Visit:      Provider verified identity through the following two step process.  Patient provided:  Patient photo    Telemedicine Visit: The patient's condition can be safely assessed and treated via synchronous audio and visual telemedicine encounter.      Reason for Telemedicine Visit: Patient has requested telehealth visit    Originating Site (Patient Location): Patient's home    Distant Site (Provider Location): Provider Remote Setting- Home Office    Consent:  The patient/guardian has verbally consented to: the potential risks and benefits of telemedicine (video visit) versus in person care; bill my insurance or make self-payment for services provided; and responsibility for payment of non-covered services.     Patient would like the video invitation sent by:  My Chart    Mode of Communication:  Video Conference via [facility]    As the provider I attest to compliance with applicable laws and regulations related to telemedicine.       DATA:  Extended Session (53+ minutes): PROLONGED SERVICE IN THE OUTPATIENT SETTING REQUIRING DIRECT (FACE-TO-FACE) PATIENT CONTACT BEYOND THE USUAL SERVICE:    - Longer session due to limited access to  "mental health appointments and necessity to address patient's distress / complexity   Interactive Complexity: No   Crisis: No     Presenting Concerns/  Current Stressors: Pt reflected on her experience going to an unexpected family members . She described her experience seeing her family. She described her experience having a trait change, honored integrity, spoke differing opinion, not being agreeable. Discussed trauma survival response to be agreeable recognized how she did not do this with her cousin. Processed, the sadness of having the memories of herself while traveling with father in [place] with , felt cared for and liked, \"I was enough, I felt liked.\" For the remainder of the session took history taking on trauma memories during childhood.     Treatment Objective(s) Addressed in This Session:      Identify thoughts, feelings, sensations, memories  Pendulation using EMDR techniques    Intervention:   EMDR preparation/history taking  Penudulation  Using skills to stay within window of tolerance                                               Jyothi Pierre     SAFETY PLAN:  Step 1: Warning signs / cues (Thoughts, images, mood, situation, behavior) that a crisis may be developing:  Thoughts: \"I don't matter\", \"People would be better off without me\" and \"I'm a burden\"  Images:  thinking about how mother said I failed my sister  Thinking Processes: racing thoughts and highly critical and negative thoughts: I'm not of value to others  Mood: worsening depression  Behaviors:  giving items away  Situations:  others making fun of hearing loss    Step 2: Coping strategies - Things I can do to take my mind off of my problems without contacting another person (relaxation technique, physical activity):  Distress Tolerance Strategies:  paced breathing/progressive muscle relaxation and grounding skills  Physical Activities: go for a walk  Focus on helpful thoughts:  self-compassion statements: I am creating new " "neruopathways, suicidal thoughts is an older pathway, I will commit to making new ones  Step 3: People and social settings that provide distraction:   Name:    nature    Step 4: Remind myself of people and things that are important to me and worth living for:  I want to model for my kids, 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   I helped develop this safety plan and agree to use it when needed.  I have been given a copy of this plan.      Client signature _________________________________________________________________  Today s date:  2021  Adapted from Safety Plan Template 2008 Lori Mercado and Juan Luis Vela is reprinted with the express permission of the authors.  No portion of the Safety Plan Template may be reproduced without the express, written permission.  You can contact the authors at [email or web address] or [email or web address].  Interventions   EMDR  ROYGBIV (Front to back of head)  Not deserving to be here --- responsibility clinical theme   Age 3, came home from [place], looked out window, thought self how do I die, 1, not wanting to be on earth anymore  Age 28 Memory: asking mother when sister  \"would you have wanted it to be me\", mother did not answer  Currently believing --- it is nothing that I did, parents lack of skills -- adult self knows     ASSESSMENT:  Mental Status " Assessment:  Appearance:   Appropriate   Eye Contact:   Good   Psychomotor Behavior: Normal   Attitude:   Cooperative   Orientation:   All  Speech   Rate / Production: Normal/ Responsive   Volume:  Normal   Mood:    Anxious   Affect:    Appropriate   Thought Content:  Clear   Thought Form:  Coherent   Insight:    Fair       Safety Issues and Plan for Safety and Risk Management:     [place] Suicide Severity Rating Scale (Lifetime/Recent)      6/30/2012     3:00 AM 2/15/2021     9:00 AM 5/5/2021     9:00 AM   [place] Suicide Severity Rating (Lifetime/Recent)   Q1 Wish to be Dead (Lifetime)  Yes Yes   Most Severe Ideation Rating (Lifetime)  3    Frequency (Lifetime)  3    Duration (Lifetime)  3    Controllability (Lifetime)  2    Protective Factors  (Lifetime)  2    Reasons for Ideation (Lifetime)  5    Do you have guns available to you? No     RETIRED: 1. Wish to be Dead (Recent)  No Yes   RETIRED: Wish to be Dead Description (Recent)   Thought of not wanting to be alive   Most Severe Ideation Rating (Past Month)  NA 2   Frequency (Past Month)  NA 2   Duration (Past Month)  NA 2   Controllability (Past Month)  NA 1   Protective Factors (Past Month)  NA 1   Reasons for Ideation (Past Month)  NA 5   Actual Attempt (Lifetime)  No Yes   Actual Attempt Description (Lifetime)   Overdose on medications 5 yeas ago     Patient denies current fears or concerns for personal safety.  Patient denies current or recent suicidal ideation or behaviors.  Patient denies current or recent homicidal ideation or behaviors.  Patient denies current or recent self injurious behavior or ideation.  Patient denies other safety concerns.  Recommended that patient call 911 or go to the local ED should there be a change in any of these risk factors.  Patient reports there are no firearms in the house.     Diagnostic Criteria:  CRITERIA (A-C) REPRESENT A MAJOR DEPRESSIVE EPISODE - SELECT THESE CRITERIA  A) Recurrent episode(s) - symptoms have been  present during the same 2-week period and represent a change from previous functioning 5 or more symptoms (required for diagnosis)   - Depressed mood. Note: In children and adolescents, can be irritable mood.     - Diminished interest or pleasure in all, or almost all, activities.    - Decreased sleep.    - Psychomotor activity agitation.    - Fatigue or loss of energy.    - Feelings of worthlessness or inappropriate and excessive guilt.    - Diminished ability to think or concentrate, or indecisiveness.   B) The symptoms cause clinically significant distress or impairment in social, occupational, or other important areas of functioning  C) The episode is not attributable to the physiological effects of a substance or to another medical condition  D) The occurence of major depressive episode is not better explained by other thought / psychotic disorders  E) There has never been a manic episode or hypomanic episode  A. The person has been exposed to a traumatic event in which both of the following were present:     (1) the person experienced, witnessed, or was confronted with an event or events that involved actual or threatened death or serious injury, or a threat to the physical integrity of self or others     (2) the person's response involved intense fear, helplessness, or horror. Note: In children, this may be expressed instead by disorganized or agitated behavior  B. The traumatic event is persistently reexperienced in one (or more) of the following ways:     - Recurrent and intrusive distressing recollections of the event, including images, thoughts, or perceptions. Note: In young children, repetitive play may occur in which themes or aspects of the trauma are expressed.      - Recurrent distressing dreams of the event. Note: In children, there may be frightening dreams without recognizable content.      - Intense psychological distress at exposure to internal or external cues that symbolize or resemble an  aspect of the traumatic event.      - Physiological reactivity on exposure to internal or external cues that symbolize or resemble an aspect of the traumatic event.   C. Persistent avoidance of stimuli associated with the trauma and numbing of general responsiveness (not present before the trauma), as indicated by three (or more) of the following:     - Efforts to avoid thoughts, feelings, or conversations associated with the trauma.      - Efforts to avoid activities, places, or people that arouse recollections of the trauma.      - Feeling of detachment or estrangement from others.   D. Persistent symptoms of increased arousal (not present before the trauma), as indicated by two (or more) of the following:     - Difficulty falling or staying asleep.      - Difficulty concentrating.      - Hypervigilance.      - Exaggerated startle response.   E. Duration of the disturbance is more than 1 month.  F. The disturbance causes clinically significant distress or impairment in social, occupational, or other important areas of functioning.      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      Cesar Magaña, ALYSON, [facility], Cesar Magaña 11/20/24                                                         Treatment Plan    Client's Name: Jyothi Pierre  YOB: 1965    Date: 4.7.21; 10.23.24    Diagnoses: 296.31 (F33.0) Major Depressive Disorder, Recurrent Episode, Mild With  anxious distress 309.81 (F43.10) Posttraumatic Stress Disorder (includes Posttraumatic Stress Disorder for Children 6 Years and Younger)  With dissociative symptoms    Referral / Collaboration:  Referral to another professional/service is not indicated at this time.    Anticipated number of session or this episode of care: 13-15      MeasurableTreatment Goal(s) related to diagnosis / functional impairment(s)    Goal 2: Client will increase her understanding and comprehension of PTSD.     I will know I've met my goal when I am better able to tolerate a mix of emotions.       Objective #A (Client Action)                Status: New - Date: 4.7.21; 10.23.24   =  Client will increasing her understanding of PTSD.     Intervention(s)  Therapist will provide educational materials on PTSD, including providing vocabulary to describe feelings associated with PTSD.     Objective #B  Client will Identify cues and symptoms related to PTSD.                    Status: New - Date: 4.7.21; 10.23.24     Intervention(s)  Therapist will provide educational materials on PTSD cues and symptoms.  teach the client how to perform a behavioral chain analysis.     Patient has reviewed and agreed to the above plan.      ALYSON Alejandro, LGSW April 7, 2021; 10.23.24

## 2024-12-10 NOTE — PROGRESS NOTES
Answers submitted by the patient for this visit:  Patient Health Questionnaire (G7) (Submitted on 10/18/2024)  DILIA 7 TOTAL SCORE: 7  Answers submitted by the patient for this visit:  Patient Health Questionnaire (Submitted on 10/18/2024)  If you checked off any problems, how difficult have these problems made it for you to do your work, take care of things at home, or get along with other people?: Somewhat difficult  PHQ9 TOTAL SCORE: 8  Patient Health Questionnaire (G7) (Submitted on 10/18/2024)  DILIA 7 TOTAL SCORE: 7                 Progress Note - Visit    Client Name:  Jyothi Pierre Date: 12/11/24           Service Type: Individual     Visit Start Time: 330pm  Visit End Time: 430pm    Time: 60 minutes    Visit #: 8    Attendees: Client attended alone    Service Modality:  Video Visit:      Provider verified identity through the following two step process.  Patient provided:  Patient photo    Telemedicine Visit: The patient's condition can be safely assessed and treated via synchronous audio and visual telemedicine encounter.      Reason for Telemedicine Visit: Patient has requested telehealth visit    Originating Site (Patient Location): Patient's home    Distant Site (Provider Location): Provider Remote Setting- Home Office    Consent:  The patient/guardian has verbally consented to: the potential risks and benefits of telemedicine (video visit) versus in person care; bill my insurance or make self-payment for services provided; and responsibility for payment of non-covered services.     Patient would like the video invitation sent by:  My Chart    Mode of Communication:  Video Conference via [facility]    As the provider I attest to compliance with applicable laws and regulations related to telemedicine.       DATA:  Extended Session (53+ minutes): PROLONGED SERVICE IN THE OUTPATIENT SETTING REQUIRING DIRECT (FACE-TO-FACE) PATIENT CONTACT BEYOND THE USUAL SERVICE:    - Longer session due to limited access to  "mental health appointments and necessity to address patient's distress / complexity   Interactive Complexity: No   Crisis: No     Presenting Concerns/  Current Stressors: Processed pt's thoughts, feelings, and reactions regarding reminders that holiday brings, with complicated bereavement. During session, helped pt become curious about a flashback she notice while watching a tv show 3 weeks ago. Processed the memory of when she was 11 in [facility]. Helped pt identify 3 areas: The guilt I feel is not mine it is my father's, I ran away from violence and toward peace, kindness, and humane treatment, and I am more than enough because I support peace, kindness, and humane treatment.    Treatment Objective(s) Addressed in This Session:      Identify thoughts, feelings, sensations, memories  Pendulation using EMDR techniques    Intervention:   EMDR preparation/history taking  Penudulation  Using skills to stay within window of tolerance                                               Jyothi Pierre     SAFETY PLAN:  Step 1: Warning signs / cues (Thoughts, images, mood, situation, behavior) that a crisis may be developing:  Thoughts: \"I don't matter\", \"People would be better off without me\" and \"I'm a burden\"  Images:  thinking about how mother said I failed my sister  Thinking Processes: racing thoughts and highly critical and negative thoughts: I'm not of value to others  Mood: worsening depression  Behaviors:  giving items away  Situations:  others making fun of hearing loss    Step 2: Coping strategies - Things I can do to take my mind off of my problems without contacting another person (relaxation technique, physical activity):  Distress Tolerance Strategies:  paced breathing/progressive muscle relaxation and grounding skills  Physical Activities: go for a walk  Focus on helpful thoughts:  self-compassion statements: I am creating new neruopathways, suicidal thoughts is an older pathway, I will commit to making new " "ones  Step 3: People and social settings that provide distraction:   Name:    nature    Step 4: Remind myself of people and things that are important to me and worth living for:  I want to model for my kids, 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   I helped develop this safety plan and agree to use it when needed.  I have been given a copy of this plan.      Client signature _________________________________________________________________  Today s date:  2021  Adapted from Safety Plan Template 2008 Lori Mercado and Juan Luis Vela is reprinted with the express permission of the authors.  No portion of the Safety Plan Template may be reproduced without the express, written permission.  You can contact the authors at [email or web address] or [email or web address].  Interventions   EMDR  ROYGBIV (Front to back of head)  Not deserving to be here --- responsibility clinical theme   Age 3, came home from [place], looked out window, thought self how do I die, 1, not wanting to be on earth anymore  Age 28 Memory: asking mother when sister  \"would you have wanted it to be me\", mother did not answer  Currently believing --- it is nothing that I did, parents lack of skills -- adult self knows     ASSESSMENT:  Mental Status Assessment:  Appearance:   Appropriate   Eye Contact:   Good   Psychomotor " Behavior: Normal   Attitude:   Cooperative   Orientation:   All  Speech   Rate / Production: Normal/ Responsive   Volume:  Normal   Mood:    Anxious   Affect:    Appropriate   Thought Content:  Clear   Thought Form:  Coherent   Insight:    Fair       Safety Issues and Plan for Safety and Risk Management:     [place] Suicide Severity Rating Scale (Lifetime/Recent)      6/30/2012     3:00 AM 2/15/2021     9:00 AM 5/5/2021     9:00 AM   [place] Suicide Severity Rating (Lifetime/Recent)   Q1 Wish to be Dead (Lifetime)  Yes Yes   Most Severe Ideation Rating (Lifetime)  3    Frequency (Lifetime)  3    Duration (Lifetime)  3    Controllability (Lifetime)  2    Protective Factors  (Lifetime)  2    Reasons for Ideation (Lifetime)  5    Do you have guns available to you? No     RETIRED: 1. Wish to be Dead (Recent)  No Yes   RETIRED: Wish to be Dead Description (Recent)   Thought of not wanting to be alive   Most Severe Ideation Rating (Past Month)  NA 2   Frequency (Past Month)  NA 2   Duration (Past Month)  NA 2   Controllability (Past Month)  NA 1   Protective Factors (Past Month)  NA 1   Reasons for Ideation (Past Month)  NA 5   Actual Attempt (Lifetime)  No Yes   Actual Attempt Description (Lifetime)   Overdose on medications 5 yeas ago     Patient denies current fears or concerns for personal safety.  Patient denies current or recent suicidal ideation or behaviors.  Patient denies current or recent homicidal ideation or behaviors.  Patient denies current or recent self injurious behavior or ideation.  Patient denies other safety concerns.  Recommended that patient call 911 or go to the local ED should there be a change in any of these risk factors.  Patient reports there are no firearms in the house.     Diagnostic Criteria:  CRITERIA (A-C) REPRESENT A MAJOR DEPRESSIVE EPISODE - SELECT THESE CRITERIA  A) Recurrent episode(s) - symptoms have been present during the same 2-week period and represent a change from previous  functioning 5 or more symptoms (required for diagnosis)   - Depressed mood. Note: In children and adolescents, can be irritable mood.     - Diminished interest or pleasure in all, or almost all, activities.    - Decreased sleep.    - Psychomotor activity agitation.    - Fatigue or loss of energy.    - Feelings of worthlessness or inappropriate and excessive guilt.    - Diminished ability to think or concentrate, or indecisiveness.   B) The symptoms cause clinically significant distress or impairment in social, occupational, or other important areas of functioning  C) The episode is not attributable to the physiological effects of a substance or to another medical condition  D) The occurence of major depressive episode is not better explained by other thought / psychotic disorders  E) There has never been a manic episode or hypomanic episode  A. The person has been exposed to a traumatic event in which both of the following were present:     (1) the person experienced, witnessed, or was confronted with an event or events that involved actual or threatened death or serious injury, or a threat to the physical integrity of self or others     (2) the person's response involved intense fear, helplessness, or horror. Note: In children, this may be expressed instead by disorganized or agitated behavior  B. The traumatic event is persistently reexperienced in one (or more) of the following ways:     - Recurrent and intrusive distressing recollections of the event, including images, thoughts, or perceptions. Note: In young children, repetitive play may occur in which themes or aspects of the trauma are expressed.      - Recurrent distressing dreams of the event. Note: In children, there may be frightening dreams without recognizable content.      - Intense psychological distress at exposure to internal or external cues that symbolize or resemble an aspect of the traumatic event.      - Physiological reactivity on exposure to  internal or external cues that symbolize or resemble an aspect of the traumatic event.   C. Persistent avoidance of stimuli associated with the trauma and numbing of general responsiveness (not present before the trauma), as indicated by three (or more) of the following:     - Efforts to avoid thoughts, feelings, or conversations associated with the trauma.      - Efforts to avoid activities, places, or people that arouse recollections of the trauma.      - Feeling of detachment or estrangement from others.   D. Persistent symptoms of increased arousal (not present before the trauma), as indicated by two (or more) of the following:     - Difficulty falling or staying asleep.      - Difficulty concentrating.      - Hypervigilance.      - Exaggerated startle response.   E. Duration of the disturbance is more than 1 month.  F. The disturbance causes clinically significant distress or impairment in social, occupational, or other important areas of functioning.      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      ALYSON Alejandro, [facility], [place] 12/11/24                                                         Treatment Plan    Client's Name: Jyothi Pierre  YOB: 1965    Date: 4.7.21;  10.23.24    Diagnoses: 296.31 (F33.0) Major Depressive Disorder, Recurrent Episode, Mild With anxious distress 309.81 (F43.10) Posttraumatic Stress Disorder (includes Posttraumatic Stress Disorder for Children 6 Years and Younger)  With dissociative symptoms    Referral / Collaboration:  Referral to another professional/service is not indicated at this time.    Anticipated number of session or this episode of care: 13-15      MeasurableTreatment Goal(s) related to diagnosis / functional impairment(s)    Goal 2: Client will increase her understanding and comprehension of PTSD.     I will know I've met my goal when I am better able to tolerate a mix of emotions.       Objective #A (Client Action)                Status: New - Date: 4.7.21; 10.23.24   =  Client will increasing her understanding of PTSD.     Intervention(s)  Therapist will provide educational materials on PTSD, including providing vocabulary to describe feelings associated with PTSD.     Objective #B  Client will Identify cues and symptoms related to PTSD.                    Status: New - Date: 4.7.21; 10.23.24     Intervention(s)  Therapist will provide educational materials on PTSD cues and symptoms.  teach the client how to perform a behavioral chain analysis.     Patient has reviewed and agreed to the above plan.      ALYSON Alejandro, [facility] April 7, 2021; 10.23.24

## 2024-12-11 ENCOUNTER — VIRTUAL VISIT (OUTPATIENT)
Dept: PSYCHOLOGY | Facility: CLINIC | Age: 59
End: 2024-12-11
Payer: COMMERCIAL

## 2024-12-11 DIAGNOSIS — F33.42 MAJOR DEPRESSIVE DISORDER, RECURRENT EPISODE, IN FULL REMISSION (H): ICD-10-CM

## 2024-12-11 DIAGNOSIS — F43.10 POST TRAUMATIC STRESS DISORDER: Primary | ICD-10-CM

## 2024-12-11 PROCEDURE — 90837 PSYTX W PT 60 MINUTES: CPT | Mod: 95 | Performed by: SOCIAL WORKER

## 2025-01-12 ASSESSMENT — PATIENT HEALTH QUESTIONNAIRE - PHQ9
SUM OF ALL RESPONSES TO PHQ QUESTIONS 1-9: 14
SUM OF ALL RESPONSES TO PHQ QUESTIONS 1-9: 14
10. IF YOU CHECKED OFF ANY PROBLEMS, HOW DIFFICULT HAVE THESE PROBLEMS MADE IT FOR YOU TO DO YOUR WORK, TAKE CARE OF THINGS AT HOME, OR GET ALONG WITH OTHER PEOPLE: SOMEWHAT DIFFICULT

## 2025-01-13 ENCOUNTER — VIRTUAL VISIT (OUTPATIENT)
Dept: PSYCHOLOGY | Facility: CLINIC | Age: 60
End: 2025-01-13
Payer: COMMERCIAL

## 2025-01-13 DIAGNOSIS — F33.42 MAJOR DEPRESSIVE DISORDER, RECURRENT EPISODE, IN FULL REMISSION: ICD-10-CM

## 2025-01-13 DIAGNOSIS — F43.10 POST TRAUMATIC STRESS DISORDER: Primary | ICD-10-CM

## 2025-01-13 PROCEDURE — 90832 PSYTX W PT 30 MINUTES: CPT | Mod: 95 | Performed by: SOCIAL WORKER

## 2025-01-13 NOTE — PROGRESS NOTES
Answers submitted by the patient for this visit:  Patient Health Questionnaire (Submitted on 1/12/2025)  If you checked off any problems, how difficult have these problems made it for you to do your work, take care of things at home, or get along with other people?: Somewhat difficult  PHQ9 TOTAL SCORE: 14  Answers submitted by the patient for this visit:  Patient Health Questionnaire (G7) (Submitted on 10/18/2024)  DILIA 7 TOTAL SCORE: 7  Answers submitted by the patient for this visit:  Patient Health Questionnaire (Submitted on 10/18/2024)  If you checked off any problems, how difficult have these problems made it for you to do your work, take care of things at home, or get along with other people?: Somewhat difficult  PHQ9 TOTAL SCORE: 8  Patient Health Questionnaire (G7) (Submitted on 10/18/2024)  DILIA 7 TOTAL SCORE: 7                 Progress Note - Visit    Client Name:  Jyothi Pierre Date: 1/13/25           Service Type: Individual     Visit Start Time: 434pm  Visit End Time: 500pm    Time: 24 minutes    Visit #:  10    Attendees: Client attended alone    Service Modality:  Video Visit:      Provider verified identity through the following two step process.  Patient provided:  Patient photo    Telemedicine Visit: The patient's condition can be safely assessed and treated via synchronous audio and visual telemedicine encounter.      Reason for Telemedicine Visit: Patient has requested telehealth visit    Originating Site (Patient Location): Patient's home    Distant Site (Provider Location): Provider Remote Setting- Home Office    Consent:  The patient/guardian has verbally consented to: the potential risks and benefits of telemedicine (video visit) versus in person care; bill my insurance or make self-payment for services provided; and responsibility for payment of non-covered services.     Patient would like the video invitation sent by:  My Chart    Mode of Communication:  Video Conference via [facility]    As  "the provider I attest to compliance with applicable laws and regulations related to telemedicine.       DATA:  Extended Session (53+ minutes): PROLONGED SERVICE IN THE OUTPATIENT SETTING REQUIRING DIRECT (FACE-TO-FACE) PATIENT CONTACT BEYOND THE USUAL SERVICE:    - Longer session due to limited access to mental health appointments and necessity to address patient's distress / complexity   Interactive Complexity: No   Crisis: No     Presenting Concerns/  Current Stressors: Processed pt's thoughts, feelings, reactions regarding a difficult area of her life to talk about. Pt provided more background about her relationships in 2009. Pt brought up a rejection from /kids as a warning/cue sign to being at risk for inpatient mental health hospitalization. She described not feeling rejection nor needing inpatient mental health hospitalization at this time. During session, helped provide pt a safe space for her to share in an open and accepting environment.     Treatment Objective(s) Addressed in This Session:      Identify thoughts, feelings, sensations, memories  Pendulation using EMDR techniques    Intervention:   EMDR preparation/history taking  Penudulation  Using skills to stay within window of tolerance                                               Jyothi Pierre     SAFETY PLAN:  Step 1: Warning signs / cues (Thoughts, images, mood, situation, behavior) that a crisis may be developing:  Thoughts: \"I don't matter\", \"People would be better off without me\" and \"I'm a burden\"  Images:  thinking about how mother said I failed my sister  Thinking Processes: racing thoughts and highly critical and negative thoughts: I'm not of value to others  Mood: worsening depression  Behaviors:  giving items away  Situations:  others making fun of hearing loss  , feel rejection from her children or   Step 2: Coping strategies - Things I can do to take my mind off of my problems without contacting another person (relaxation " technique, physical activity):  Distress Tolerance Strategies:  paced breathing/progressive muscle relaxation and grounding skills  Physical Activities: go for a walk  Focus on helpful thoughts:  self-compassion statements: I am creating new neruopathways, suicidal thoughts is an older pathway, I will commit to making new ones  Step 3: People and social settings that provide distraction:   Name:    nature    Step 4: Remind myself of people and things that are important to me and worth living for:  I want to model for my kids, 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   I helped develop this safety plan and agree to use it when needed.  I have been given a copy of this plan.      Client signature _________________________________________________________________  Today s date:  5/5/2021  Adapted from Safety Plan Template 2008 Lori Mercado and Juan Luis Vela is reprinted with the express permission of the authors.  No portion of the Safety Plan Template may be reproduced without the express, written permission.  You can contact the authors at [email or web address] or [email or web address].  Interventions   EMDR  ROYGBIV (Front to back of head)  Not deserving to be here --- responsibility clinical theme   Age 3, came home from [place], looked out window, thought self how do I die, 1, not wanting to be on  "earth anymore  Age 28 Memory: asking mother when sister  \"would you have wanted it to be me\", mother did not answer  Currently believing --- it is nothing that I did, parents lack of skills -- adult self knows     ASSESSMENT:  Mental Status Assessment:  Appearance:   Appropriate   Eye Contact:   Good   Psychomotor Behavior: Normal   Attitude:   Cooperative   Orientation:   All  Speech   Rate / Production: Normal/ Responsive   Volume:  Normal   Mood:    Anxious   Affect:    Appropriate   Thought Content:  Clear   Thought Form:  Coherent   Insight:    Fair       Safety Issues and Plan for Safety and Risk Management:     [place] Suicide Severity Rating Scale (Lifetime/Recent)      2012     3:00 AM 2/15/2021     9:00 AM 2021     9:00 AM   [place] Suicide Severity Rating (Lifetime/Recent)   Q1 Wish to be Dead (Lifetime)  Yes Yes   Most Severe Ideation Rating (Lifetime)  3    Frequency (Lifetime)  3    Duration (Lifetime)  3    Controllability (Lifetime)  2    Protective Factors  (Lifetime)  2    Reasons for Ideation (Lifetime)  5    Do you have guns available to you? No     RETIRED: 1. Wish to be Dead (Recent)  No Yes   RETIRED: Wish to be Dead Description (Recent)   Thought of not wanting to be alive   Most Severe Ideation Rating (Past Month)  NA 2   Frequency (Past Month)  NA 2   Duration (Past Month)  NA 2   Controllability (Past Month)  NA 1   Protective Factors (Past Month)  NA 1   Reasons for Ideation (Past Month)  NA 5   Actual Attempt (Lifetime)  No Yes   Actual Attempt Description (Lifetime)   Overdose on medications 5 yeas ago     Patient denies current fears or concerns for personal safety.  Patient denies current or recent suicidal ideation or behaviors.  Patient denies current or recent homicidal ideation or behaviors.  Patient denies current or recent self injurious behavior or ideation.  Patient denies other safety concerns.  Recommended that patient call 911 or go to the local ED should " there be a change in any of these risk factors.  Patient reports there are no firearms in the house.     Diagnostic Criteria:  CRITERIA (A-C) REPRESENT A MAJOR DEPRESSIVE EPISODE - SELECT THESE CRITERIA  A) Recurrent episode(s) - symptoms have been present during the same 2-week period and represent a change from previous functioning 5 or more symptoms (required for diagnosis)   - Depressed mood. Note: In children and adolescents, can be irritable mood.     - Diminished interest or pleasure in all, or almost all, activities.    - Decreased sleep.    - Psychomotor activity agitation.    - Fatigue or loss of energy.    - Feelings of worthlessness or inappropriate and excessive guilt.    - Diminished ability to think or concentrate, or indecisiveness.   B) The symptoms cause clinically significant distress or impairment in social, occupational, or other important areas of functioning  C) The episode is not attributable to the physiological effects of a substance or to another medical condition  D) The occurence of major depressive episode is not better explained by other thought / psychotic disorders  E) There has never been a manic episode or hypomanic episode  A. The person has been exposed to a traumatic event in which both of the following were present:     (1) the person experienced, witnessed, or was confronted with an event or events that involved actual or threatened death or serious injury, or a threat to the physical integrity of self or others     (2) the person's response involved intense fear, helplessness, or horror. Note: In children, this may be expressed instead by disorganized or agitated behavior  B. The traumatic event is persistently reexperienced in one (or more) of the following ways:     - Recurrent and intrusive distressing recollections of the event, including images, thoughts, or perceptions. Note: In young children, repetitive play may occur in which themes or aspects of the trauma are  expressed.      - Recurrent distressing dreams of the event. Note: In children, there may be frightening dreams without recognizable content.      - Intense psychological distress at exposure to internal or external cues that symbolize or resemble an aspect of the traumatic event.      - Physiological reactivity on exposure to internal or external cues that symbolize or resemble an aspect of the traumatic event.   C. Persistent avoidance of stimuli associated with the trauma and numbing of general responsiveness (not present before the trauma), as indicated by three (or more) of the following:     - Efforts to avoid thoughts, feelings, or conversations associated with the trauma.      - Efforts to avoid activities, places, or people that arouse recollections of the trauma.      - Feeling of detachment or estrangement from others.   D. Persistent symptoms of increased arousal (not present before the trauma), as indicated by two (or more) of the following:     - Difficulty falling or staying asleep.      - Difficulty concentrating.      - Hypervigilance.      - Exaggerated startle response.   E. Duration of the disturbance is more than 1 month.  F. The disturbance causes clinically significant distress or impairment in social, occupational, or other important areas of functioning.      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      ALYSNO Alejandro, [facility], Cesar Magaña 1/13/25                                                         Treatment Plan    Client's Name: Jyothi Pierre  YOB: 1965    Date: 4.7.21; 10.23.24    Diagnoses: 296.31 (F33.0) Major Depressive Disorder, Recurrent Episode, Mild With anxious distress 309.81 (F43.10) Posttraumatic Stress Disorder (includes Posttraumatic Stress Disorder for Children 6 Years and Younger)  With dissociative symptoms    Referral / Collaboration:  Referral to another professional/service is not indicated at this time.    Anticipated number of session or this episode of care: 13-15      MeasurableTreatment Goal(s) related to diagnosis / functional impairment(s)    Goal 2: Client will increase her understanding and comprehension of PTSD.     I will know I've met my goal when I am better able to tolerate a mix of emotions.       Objective #A (Client Action)                Status: New - Date: 4.7.21; 10.23.24   =  Client will increasing her understanding of PTSD.     Intervention(s)  Therapist will provide educational materials on PTSD, including providing vocabulary to describe feelings associated with PTSD.     Objective #B  Client will Identify cues and symptoms related to PTSD.                    Status: New - Date: 4.7.21; 10.23.24     Intervention(s)  Therapist will provide educational materials on PTSD cues and symptoms.  teach the client how to perform a behavioral chain analysis.     Patient has reviewed and agreed to the above plan.      ALYSON Alejandro, [facility] April 7, 2021; 10.23.24

## 2025-02-05 ENCOUNTER — VIRTUAL VISIT (OUTPATIENT)
Dept: PSYCHOLOGY | Facility: CLINIC | Age: 60
End: 2025-02-05
Payer: COMMERCIAL

## 2025-02-05 DIAGNOSIS — F33.1 MODERATE EPISODE OF RECURRENT MAJOR DEPRESSIVE DISORDER (H): ICD-10-CM

## 2025-02-05 DIAGNOSIS — F43.10 POST TRAUMATIC STRESS DISORDER: Primary | ICD-10-CM

## 2025-02-05 DIAGNOSIS — F33.42 MAJOR DEPRESSIVE DISORDER, RECURRENT EPISODE, IN FULL REMISSION: ICD-10-CM

## 2025-02-05 PROCEDURE — 90837 PSYTX W PT 60 MINUTES: CPT | Mod: 95 | Performed by: SOCIAL WORKER

## 2025-02-05 NOTE — PROGRESS NOTES
Answers submitted by the patient for this visit:  Patient Health Questionnaire (Submitted on 1/12/2025)  If you checked off any problems, how difficult have these problems made it for you to do your work, take care of things at home, or get along with other people?: Somewhat difficult  PHQ9 TOTAL SCORE: 14  Answers submitted by the patient for this visit:  Patient Health Questionnaire (G7) (Submitted on 10/18/2024)  DILIA 7 TOTAL SCORE: 7  Answers submitted by the patient for this visit:  Patient Health Questionnaire (Submitted on 10/18/2024)  If you checked off any problems, how difficult have these problems made it for you to do your work, take care of things at home, or get along with other people?: Somewhat difficult  PHQ9 TOTAL SCORE: 8  Patient Health Questionnaire (G7) (Submitted on 10/18/2024)  DILIA 7 TOTAL SCORE: 7                 Progress Note - Visit    Client Name:  Jyothi Pierre Date: 2/05/25           Service Type: Individual     Visit Start Time: 334pm  Visit End Time: 430pm    Time: 56 minutes    Visit #:  11    Attendees: Client attended alone    Service Modality:  Video Visit:      Provider verified identity through the following two step process.  Patient provided:  Patient photo    Telemedicine Visit: The patient's condition can be safely assessed and treated via synchronous audio and visual telemedicine encounter.      Reason for Telemedicine Visit: Patient has requested telehealth visit    Originating Site (Patient Location): Patient's home    Distant Site (Provider Location): Provider Remote Setting- Home Office    Consent:  The patient/guardian has verbally consented to: the potential risks and benefits of telemedicine (video visit) versus in person care; bill my insurance or make self-payment for services provided; and responsibility for payment of non-covered services.     Patient would like the video invitation sent by:  My Chart    Mode of Communication:  Video Conference via [employer]    As  "the provider I attest to compliance with applicable laws and regulations related to telemedicine.       DATA:  Extended Session (53+ minutes): PROLONGED SERVICE IN THE OUTPATIENT SETTING REQUIRING DIRECT (FACE-TO-FACE) PATIENT CONTACT BEYOND THE USUAL SERVICE:    - Longer session due to limited access to mental health appointments and necessity to address patient's distress / complexity   Interactive Complexity: No   Crisis: No     Presenting Concerns/  Current Stressors: Discussed how stress will increase though we can take care of our bodies if we can build in loosening tightness multiple times a day as we discussed the effects of accumulated stress, practiced at the end of session connecting with breath and bodily movement to produce a more open response. Discussed her being aware of taking care of herself and following through with the actions. Recognized her courage at each point of time when she commits to staying and following through. Processed her openness in recognizing the function of gaining weight can be a defense mechanism, explored with curiosity brought about education of how it can be a way to resist others from getting close if others got close. Processed the trigger/reminder of a trauma memory. Encouraged pt to voice adaptive believe \"it isn't your fault, your body is previous and sacred, I want you to make the choice/not someone else.\" \"I'd remind myself every type of body is beautiful.\" For the remainder of the session, utilized EMDR based treatment identifying defectiveness as the core belief. Utilized socratic questioning to lessening validity of defectiveness and encouraged pt to utilize the questioning in present day life as she moves toward making choices holding this belief a little less closely.     Treatment Objective(s) Addressed in This Session:      Identify thoughts, feelings, sensations, memories  Pendulation using EMDR techniques    Intervention:   EMDR preparation/history " "taking  Penudulation  Using skills to stay within window of tolerance                                               Jyothi Pierre     SAFETY PLAN:  Step 1: Warning signs / cues (Thoughts, images, mood, situation, behavior) that a crisis may be developing:  Thoughts: \"I don't matter\", \"People would be better off without me\" and \"I'm a burden\"  Images:  thinking about how mother said I failed my sister  Thinking Processes: racing thoughts and highly critical and negative thoughts: I'm not of value to others  Mood: worsening depression  Behaviors:  giving items away  Situations:  others making fun of hearing loss  , feel rejection from her children or   Step 2: Coping strategies - Things I can do to take my mind off of my problems without contacting another person (relaxation technique, physical activity):  Distress Tolerance Strategies:  paced breathing/progressive muscle relaxation and grounding skills  Physical Activities: go for a walk  Focus on helpful thoughts:  self-compassion statements: I am creating new neruopathways, suicidal thoughts is an older pathway, I will commit to making new ones  Step 3: People and social settings that provide distraction:   Name:    nature    Step 4: Remind myself of people and things that are important to me and worth living for:  I want to model for my kids, 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   I helped develop this safety plan and agree to use it when needed.  I have been given a copy of this plan.      Client signature _________________________________________________________________  Today s date:  2021  Adapted from Safety Plan Template 2008 Lori Mercado and Juan Luis Vela is reprinted with the express permission of the authors.  No portion of the Safety Plan Template may be reproduced without the express, written permission.  You can contact the authors at [email or web address] or [email or web address].  Interventions   EMDR  ROYGBIV (Front to back of head)  Not deserving to be here --- responsibility clinical theme   Age 3, came home from [employer], looked out window, thought self how do I die, 1, not wanting to be on earth anymore  Age 28 Memory: asking mother when sister  \"would you have wanted it to be me\", mother did not answer  Currently believing --- it is nothing that I did, parents lack of skills -- adult self knows     ASSESSMENT:  Mental Status Assessment:  Appearance:   Appropriate   Eye Contact:   Good   Psychomotor Behavior: Normal   Attitude:   Cooperative   Orientation:   All  Speech   Rate / Production: Normal/ Responsive   Volume:  Normal   Mood:    Anxious   Affect:    Appropriate   Thought Content:  Clear   Thought Form:  Coherent   Insight:    Fair       Safety Issues and Plan for Safety and Risk Management:     [place] Suicide Severity Rating Scale (Lifetime/Recent)      2012     3:00 AM 2/15/2021     9:00 AM 2021     9:00 AM   [place] Suicide Severity Rating (Lifetime/Recent)   Q1 Wish to be Dead (Lifetime)  Yes Yes   Most Severe Ideation Rating (Lifetime)  3    Frequency (Lifetime)  3    Duration (Lifetime)  3    Controllability (Lifetime)  2    Protective Factors  (Lifetime)  2    Reasons for Ideation (Lifetime)  5    Do you have guns available to you? No     RETIRED: 1. Wish to be Dead (Recent)  No Yes   RETIRED: " Wish to be Dead Description (Recent)   Thought of not wanting to be alive   Most Severe Ideation Rating (Past Month)  NA 2   Frequency (Past Month)  NA 2   Duration (Past Month)  NA 2   Controllability (Past Month)  NA 1   Protective Factors (Past Month)  NA 1   Reasons for Ideation (Past Month)  NA 5   Actual Attempt (Lifetime)  No Yes   Actual Attempt Description (Lifetime)   Overdose on medications 5 yeas ago     Patient denies current fears or concerns for personal safety.  Patient denies current or recent suicidal ideation or behaviors.  Patient denies current or recent homicidal ideation or behaviors.  Patient denies current or recent self injurious behavior or ideation.  Patient denies other safety concerns.  Recommended that patient call 911 or go to the local ED should there be a change in any of these risk factors.  Patient reports there are no firearms in the house.     Diagnostic Criteria:  CRITERIA (A-C) REPRESENT A MAJOR DEPRESSIVE EPISODE - SELECT THESE CRITERIA  A) Recurrent episode(s) - symptoms have been present during the same 2-week period and represent a change from previous functioning 5 or more symptoms (required for diagnosis)   - Depressed mood. Note: In children and adolescents, can be irritable mood.     - Diminished interest or pleasure in all, or almost all, activities.    - Decreased sleep.    - Psychomotor activity agitation.    - Fatigue or loss of energy.    - Feelings of worthlessness or inappropriate and excessive guilt.    - Diminished ability to think or concentrate, or indecisiveness.   B) The symptoms cause clinically significant distress or impairment in social, occupational, or other important areas of functioning  C) The episode is not attributable to the physiological effects of a substance or to another medical condition  D) The occurence of major depressive episode is not better explained by other thought / psychotic disorders  E) There has never been a manic episode or  hypomanic episode  A. The person has been exposed to a traumatic event in which both of the following were present:     (1) the person experienced, witnessed, or was confronted with an event or events that involved actual or threatened death or serious injury, or a threat to the physical integrity of self or others     (2) the person's response involved intense fear, helplessness, or horror. Note: In children, this may be expressed instead by disorganized or agitated behavior  B. The traumatic event is persistently reexperienced in one (or more) of the following ways:     - Recurrent and intrusive distressing recollections of the event, including images, thoughts, or perceptions. Note: In young children, repetitive play may occur in which themes or aspects of the trauma are expressed.      - Recurrent distressing dreams of the event. Note: In children, there may be frightening dreams without recognizable content.      - Intense psychological distress at exposure to internal or external cues that symbolize or resemble an aspect of the traumatic event.      - Physiological reactivity on exposure to internal or external cues that symbolize or resemble an aspect of the traumatic event.   C. Persistent avoidance of stimuli associated with the trauma and numbing of general responsiveness (not present before the trauma), as indicated by three (or more) of the following:     - Efforts to avoid thoughts, feelings, or conversations associated with the trauma.      - Efforts to avoid activities, places, or people that arouse recollections of the trauma.      - Feeling of detachment or estrangement from others.   D. Persistent symptoms of increased arousal (not present before the trauma), as indicated by two (or more) of the following:     - Difficulty falling or staying asleep.      - Difficulty concentrating.      - Hypervigilance.      - Exaggerated startle response.   E. Duration of the disturbance is more than 1 month.  F.  The disturbance causes clinically significant distress or impairment in social, occupational, or other important areas of functioning.      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       Cesar Magaña, MSW, [facility], Cesar Magaña 2/05/25                                                         Treatment Plan    Client's Name: Jyothi Pierre  YOB: 1965    Date: 4.7.21; 10.23.24    Diagnoses: 296.31 (F33.0) Major Depressive Disorder, Recurrent Episode, Mild With anxious distress 309.81 (F43.10) Posttraumatic Stress Disorder (includes Posttraumatic Stress Disorder for Children 6 Years and Younger)  With dissociative symptoms    Referral / Collaboration:  Referral to another professional/service is not indicated at this time.    Anticipated number of session or this episode of care: 13-15      MeasurableTreatment Goal(s) related to diagnosis / functional impairment(s)    Goal 2: Client will increase her understanding and comprehension of PTSD.     I will know I've met my goal when I am better able to tolerate a mix of emotions.       Objective #A (Client Action)                Status: New - Date: 4.7.21; 10.23.24   =  Client will increasing her understanding of PTSD.     Intervention(s)  Therapist will provide educational materials on PTSD, including providing vocabulary to describe feelings  associated with PTSD.     Objective #B  Client will Identify cues and symptoms related to PTSD.                    Status: New - Date: 4.7.21; 10.23.24     Intervention(s)  Therapist will provide educational materials on PTSD cues and symptoms.  teach the client how to perform a behavioral chain analysis.     Patient has reviewed and agreed to the above plan.      ALYSON Alejandro, [facility] April 7, 2021; 10.23.24

## 2025-02-25 ASSESSMENT — PATIENT HEALTH QUESTIONNAIRE - PHQ9
SUM OF ALL RESPONSES TO PHQ QUESTIONS 1-9: 6
SUM OF ALL RESPONSES TO PHQ QUESTIONS 1-9: 6
10. IF YOU CHECKED OFF ANY PROBLEMS, HOW DIFFICULT HAVE THESE PROBLEMS MADE IT FOR YOU TO DO YOUR WORK, TAKE CARE OF THINGS AT HOME, OR GET ALONG WITH OTHER PEOPLE: SOMEWHAT DIFFICULT

## 2025-02-26 ENCOUNTER — VIRTUAL VISIT (OUTPATIENT)
Dept: PSYCHIATRY | Facility: CLINIC | Age: 60
End: 2025-02-26
Attending: NURSE PRACTITIONER
Payer: COMMERCIAL

## 2025-02-26 VITALS — BODY MASS INDEX: 32.44 KG/M2 | HEIGHT: 69 IN | WEIGHT: 219 LBS

## 2025-02-26 DIAGNOSIS — F90.9 ATTENTION DEFICIT HYPERACTIVITY DISORDER (ADHD), UNSPECIFIED ADHD TYPE: ICD-10-CM

## 2025-02-26 DIAGNOSIS — F33.0 MAJOR DEPRESSIVE DISORDER, RECURRENT EPISODE, MILD: ICD-10-CM

## 2025-02-26 RX ORDER — METHYLPHENIDATE HYDROCHLORIDE EXTENDED RELEASE 10 MG/1
10 TABLET ORAL 3 TIMES DAILY PRN
Qty: 90 TABLET | Refills: 0 | Status: SHIPPED | OUTPATIENT
Start: 2025-05-15

## 2025-02-26 RX ORDER — METHYLPHENIDATE HYDROCHLORIDE EXTENDED RELEASE 10 MG/1
10 TABLET ORAL 3 TIMES DAILY PRN
Qty: 90 TABLET | Refills: 0 | Status: SHIPPED | OUTPATIENT
Start: 2025-04-17

## 2025-02-26 RX ORDER — GABAPENTIN 100 MG/1
CAPSULE ORAL
Qty: 180 CAPSULE | Refills: 2 | Status: SHIPPED | OUTPATIENT
Start: 2025-02-26

## 2025-02-26 RX ORDER — METHYLPHENIDATE HYDROCHLORIDE EXTENDED RELEASE 10 MG/1
TABLET ORAL
Qty: 90 TABLET | Refills: 0 | Status: SHIPPED | OUTPATIENT
Start: 2025-03-20

## 2025-02-26 RX ORDER — ESCITALOPRAM OXALATE 20 MG/1
20 TABLET ORAL DAILY
Qty: 90 TABLET | Refills: 0 | Status: SHIPPED | OUTPATIENT
Start: 2025-02-26

## 2025-02-26 ASSESSMENT — PAIN SCALES - GENERAL: PAINLEVEL_OUTOF10: SEVERE PAIN (7)

## 2025-02-26 NOTE — NURSING NOTE
Current patient location: [address] [place] [address]  [place] [address]    Is the patient currently in the state of [place]? YES    Visit mode: VIDEO    If the visit is dropped, the patient can be reconnected by:VIDEO VISIT: Text to cell phone:   Telephone Information:   Mobile [phone]       Will anyone else be joining the visit? NO  (If patient encounters technical issues they should call [phone]    Are changes needed to the allergy or medication list? No    Are refills needed on medications prescribed by this physician? YES    Rooming Documentation:  Questionnaire(s) completed    Reason for visit: RECHECK    Ana SOLISF

## 2025-02-26 NOTE — PATIENT INSTRUCTIONS
**For crisis resources, please see the information at the end of this document**   Patient Education    Thank you for coming to the [facility].     Lab Testing:  If you had lab testing today and your results are reassuring or normal they will be mailed to you or sent through DEQ within 7 days. If the lab tests need quick action we will call you with the results. The phone number we will call with results is # [phone]. If this is not the best number please call our clinic and change the number.     Medication Refills:  If you need any refills please call your pharmacy and they will contact us. Our fax number for refills is [phone].   Three business days of notice are needed for general medication refill requests.   Five business days of notice are needed for controlled substance refill requests.   If you need to change to a different pharmacy, please contact the new pharmacy directly. The new pharmacy will help you get your medications transferred.     Contact Us:  Please call [phone] during business hours (8-5:00 M-F).   If you have medication related questions after clinic hours, or on the weekend, please call [phone].     Financial Assistance [phone]   Medical Records [phone]       MENTAL HEALTH CRISIS RESOURCES:  For a emergency help, please call 911 or go to the nearest Emergency Department.     Emergency Walk-In Options:   EmPATH Unit @ [facility] ([place]): [phone] - Specialized mental health emergency area designed to be calming  [facility] ([place]): [phone]  [facility] ([place]): [phone]  [facility]): [phone]    [facility] Crisis Information:   [place]: [phone]  [place]: [phone]  [place] ([place]) - Adult: [phone]     Child: [phone]  [place] - Adult: [phone]     Child: [phone]  [place]:  [phone]  List of all [facility] resources:   https://[place].gov/dhs/people-we-serve/adults/health-care/mental-health/resources/crisis-contacts.jsp    National Crisis Information:   Crisis Text Line: Text  [place]  to 095328  Suicide & Crisis Lifeline: 988  National Suicide Prevention Lifeline: 5-937-755-TALK (1-185.892.4513)       For online chat options, visit https://suicidepreventionlifeline.org/chat/  Poison Control Center: 1-127-214-8436  Trans Lifeline: 8-902-573-2862 - Hotline for transgender people of all ages  The Dario Project: 5-493-862-6365 - Hotline for LGBT youth     For Non-Emergency Support:   Fast Tracker: Mental Health & Substance Use Disorder Resources -   https://www.[employer].org/
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
## 2025-03-05 ENCOUNTER — VIRTUAL VISIT (OUTPATIENT)
Facility: CLINIC | Age: 60
End: 2025-03-05
Payer: COMMERCIAL

## 2025-03-05 DIAGNOSIS — F33.1 MODERATE EPISODE OF RECURRENT MAJOR DEPRESSIVE DISORDER (H): ICD-10-CM

## 2025-03-05 DIAGNOSIS — F43.10 POST TRAUMATIC STRESS DISORDER: Primary | ICD-10-CM

## 2025-03-05 PROCEDURE — 90837 PSYTX W PT 60 MINUTES: CPT | Mod: 95 | Performed by: SOCIAL WORKER

## 2025-03-05 ASSESSMENT — ANXIETY QUESTIONNAIRES
3. WORRYING TOO MUCH ABOUT DIFFERENT THINGS: MORE THAN HALF THE DAYS
IF YOU CHECKED OFF ANY PROBLEMS ON THIS QUESTIONNAIRE, HOW DIFFICULT HAVE THESE PROBLEMS MADE IT FOR YOU TO DO YOUR WORK, TAKE CARE OF THINGS AT HOME, OR GET ALONG WITH OTHER PEOPLE: SOMEWHAT DIFFICULT
8. IF YOU CHECKED OFF ANY PROBLEMS, HOW DIFFICULT HAVE THESE MADE IT FOR YOU TO DO YOUR WORK, TAKE CARE OF THINGS AT HOME, OR GET ALONG WITH OTHER PEOPLE?: SOMEWHAT DIFFICULT
5. BEING SO RESTLESS THAT IT IS HARD TO SIT STILL: SEVERAL DAYS
7. FEELING AFRAID AS IF SOMETHING AWFUL MIGHT HAPPEN: MORE THAN HALF THE DAYS
GAD7 TOTAL SCORE: 12
7. FEELING AFRAID AS IF SOMETHING AWFUL MIGHT HAPPEN: MORE THAN HALF THE DAYS
4. TROUBLE RELAXING: MORE THAN HALF THE DAYS
GAD7 TOTAL SCORE: 12
1. FEELING NERVOUS, ANXIOUS, OR ON EDGE: MORE THAN HALF THE DAYS
6. BECOMING EASILY ANNOYED OR IRRITABLE: SEVERAL DAYS
GAD7 TOTAL SCORE: 12
2. NOT BEING ABLE TO STOP OR CONTROL WORRYING: MORE THAN HALF THE DAYS

## 2025-03-05 ASSESSMENT — PATIENT HEALTH QUESTIONNAIRE - PHQ9
SUM OF ALL RESPONSES TO PHQ QUESTIONS 1-9: 12
10. IF YOU CHECKED OFF ANY PROBLEMS, HOW DIFFICULT HAVE THESE PROBLEMS MADE IT FOR YOU TO DO YOUR WORK, TAKE CARE OF THINGS AT HOME, OR GET ALONG WITH OTHER PEOPLE: SOMEWHAT DIFFICULT
SUM OF ALL RESPONSES TO PHQ QUESTIONS 1-9: 12

## 2025-03-05 NOTE — PROGRESS NOTES
Answers submitted by the patient for this visit:  Patient Health Questionnaire (Submitted on 3/5/2025)  If you checked off any problems, how difficult have these problems made it for you to do your work, take care of things at home, or get along with other people?: Somewhat difficult  PHQ9 TOTAL SCORE: 12  Patient Health Questionnaire (G7) (Submitted on 3/5/2025)  DILIA 7 TOTAL SCORE: 12  Answers submitted by the patient for this visit:  Patient Health Questionnaire (Submitted on 1/12/2025)  If you checked off any problems, how difficult have these problems made it for you to do your work, take care of things at home, or get along with other people?: Somewhat difficult  PHQ9 TOTAL SCORE: 14  Answers submitted by the patient for this visit:  Patient Health Questionnaire (G7) (Submitted on 10/18/2024)  DILIA 7 TOTAL SCORE: 7  Answers submitted by the patient for this visit:  Patient Health Questionnaire (Submitted on 10/18/2024)  If you checked off any problems, how difficult have these problems made it for you to do your work, take care of things at home, or get along with other people?: Somewhat difficult  PHQ9 TOTAL SCORE: 8  Patient Health Questionnaire (G7) (Submitted on 10/18/2024)  DILIA 7 TOTAL SCORE: 7                 Progress Note - Visit    Client Name:  Jyothi Pierre Date: 3/05/25           Service Type: Individual     Visit Start Time: 331pm  Visit End Time: 430pm    Time: 59 minutes    Visit #:  11    Attendees: Client attended alone    Service Modality:  Video Visit:      Provider verified identity through the following two step process.  Patient provided:  Patient photo    Telemedicine Visit: The patient's condition can be safely assessed and treated via synchronous audio and visual telemedicine encounter.      Reason for Telemedicine Visit: Patient has requested telehealth visit    Originating Site (Patient Location): Patient's home    Distant Site (Provider Location): Provider Remote Setting- Home  "Office    Consent:  The patient/guardian has verbally consented to: the potential risks and benefits of telemedicine (video visit) versus in person care; bill my insurance or make self-payment for services provided; and responsibility for payment of non-covered services.     Patient would like the video invitation sent by:  My Chart    Mode of Communication:  Video Conference via [facility]    As the provider I attest to compliance with applicable laws and regulations related to telemedicine.       DATA:  Extended Session (53+ minutes): PROLONGED SERVICE IN THE OUTPATIENT SETTING REQUIRING DIRECT (FACE-TO-FACE) PATIENT CONTACT BEYOND THE USUAL SERVICE:    - Longer session due to limited access to mental health appointments and necessity to address patient's distress / complexity   Interactive Complexity: No   Crisis: No     Presenting Concerns/  Current Stressors: Processed her thoughts, feelings, and reactions regarding a present day situation at work creating distress and a past trauma response. When talking was direct and transparent about this writer taking the conversation with lighter emotional content and more intense emotional content. She noticed herself today noticing herself desiring to say things to get on her good side. She described this is a familiar response. Utilized a behavioral chain response, thought \"I've got to fix this to stay safe to protect myself\", sensation \"heart races\", feeling of fear/powerless.\" During session, utilized index cards to use DBT to focus facts, \"she is doing this because she loves her son and I don't have to like the way she is going about it.\" \"It is okay to be angry, I am angry, this does not feel okay to me.\" For the remainder of the session, utilized problem solving therapy to help to highlight a full list of facts to help her navigate the situation at work.     Treatment Objective(s) Addressed in This Session:      Identify thoughts, feelings, sensations, " "memories  Pendulation using EMDR techniques    Intervention:   EMDR preparation/history taking  Penudulation  Using skills to stay within window of tolerance                                               Jyothi Pierre     SAFETY PLAN:  Step 1: Warning signs / cues (Thoughts, images, mood, situation, behavior) that a crisis may be developing:  Thoughts: \"I don't matter\", \"People would be better off without me\" and \"I'm a burden\"  Images:  thinking about how mother said I failed my sister  Thinking Processes: racing thoughts and highly critical and negative thoughts: I'm not of value to others  Mood: worsening depression  Behaviors:  giving items away  Situations:  others making fun of hearing loss  , feel rejection from her children or   Step 2: Coping strategies - Things I can do to take my mind off of my problems without contacting another person (relaxation technique, physical activity):  Distress Tolerance Strategies:  paced breathing/progressive muscle relaxation and grounding skills  Physical Activities: go for a walk  Focus on helpful thoughts:  self-compassion statements: I am creating new neruopathways, suicidal thoughts is an older pathway, I will commit to making new ones  Step 3: People and social settings that provide distraction:   Name:    nature    Step 4: Remind myself of people and things that are important to me and worth living for:  I want to model for my kids, 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 Daytime Number: [phone]  Suicide Prevention Lifeline: 4-969-541-TALK (0922)  Crisis Text Line Service (available 24 " "hours a day, 7 days a week): Text [place] to 422884  Local Crisis Services:     Call 911 or go to my nearest emergency department.   I helped develop this safety plan and agree to use it when needed.  I have been given a copy of this plan.      Client signature _________________________________________________________________  Today s date:  2021  Adapted from Safety Plan Template 2008 Lori Mercado and Juan Luis Vela is reprinted with the express permission of the authors.  No portion of the Safety Plan Template may be reproduced without the express, written permission.  You can contact the authors at [email or web address] or [email or web address].  Interventions   EMDR  ROYGBIV (Front to back of head)  Not deserving to be here --- responsibility clinical theme   Age 3, [place] [place], looked out window, thought self how do I die, 1, not wanting to be on earth anymore  Age 28 Memory: asking mother when sister  \"would you have wanted it to be me\", mother did not answer  Currently believing --- it is nothing that I did, parents lack of skills -- adult self knows     ASSESSMENT:  Mental Status Assessment:  Appearance:   Appropriate   Eye Contact:   Good   Psychomotor Behavior: Normal   Attitude:   Cooperative   Orientation:   All  Speech   Rate / Production: Normal/ Responsive   Volume:  Normal   Mood:    Anxious   Affect:    Appropriate   Thought Content:  Clear   Thought Form:  Coherent   Insight:    Fair       Safety Issues and Plan for Safety and Risk Management:     [place] Suicide Severity Rating Scale (Lifetime/Recent)      2012     3:00 AM 2/15/2021     9:00 AM 2021     9:00 AM   [place] Suicide Severity Rating (Lifetime/Recent)   Q1 Wish to be Dead (Lifetime)  Yes Yes   Most Severe Ideation Rating (Lifetime)  3    Frequency (Lifetime)  3    Duration (Lifetime)  3    Controllability (Lifetime)  2    Protective Factors  (Lifetime)  2    Reasons for Ideation (Lifetime)  5    Do you have " guns available to you? No     RETIRED: 1. Wish to be Dead (Recent)  No Yes   RETIRED: Wish to be Dead Description (Recent)   Thought of not wanting to be alive   Most Severe Ideation Rating (Past Month)  NA 2   Frequency (Past Month)  NA 2   Duration (Past Month)  NA 2   Controllability (Past Month)  NA 1   Protective Factors (Past Month)  NA 1   Reasons for Ideation (Past Month)  NA 5   Actual Attempt (Lifetime)  No Yes   Actual Attempt Description (Lifetime)   Overdose on medications 5 yeas ago     Patient denies current fears or concerns for personal safety.  Patient denies current or recent suicidal ideation or behaviors.  Patient denies current or recent homicidal ideation or behaviors.  Patient denies current or recent self injurious behavior or ideation.  Patient denies other safety concerns.  Recommended that patient call 911 or go to the local ED should there be a change in any of these risk factors.  Patient reports there are no firearms in the house.     Diagnostic Criteria:  CRITERIA (A-C) REPRESENT A MAJOR DEPRESSIVE EPISODE - SELECT THESE CRITERIA  A) Recurrent episode(s) - symptoms have been present during the same 2-week period and represent a change from previous functioning 5 or more symptoms (required for diagnosis)   - Depressed mood. Note: In children and adolescents, can be irritable mood.     - Diminished interest or pleasure in all, or almost all, activities.    - Decreased sleep.    - Psychomotor activity agitation.    - Fatigue or loss of energy.    - Feelings of worthlessness or inappropriate and excessive guilt.    - Diminished ability to think or concentrate, or indecisiveness.   B) The symptoms cause clinically significant distress or impairment in social, occupational, or other important areas of functioning  C) The episode is not attributable to the physiological effects of a substance or to another medical condition  D) The occurence of major depressive episode is not better  explained by other thought / psychotic disorders  E) There has never been a manic episode or hypomanic episode  A. The person has been exposed to a traumatic event in which both of the following were present:     (1) the person experienced, witnessed, or was confronted with an event or events that involved actual or threatened death or serious injury, or a threat to the physical integrity of self or others     (2) the person's response involved intense fear, helplessness, or horror. Note: In children, this may be expressed instead by disorganized or agitated behavior  B. The traumatic event is persistently reexperienced in one (or more) of the following ways:     - Recurrent and intrusive distressing recollections of the event, including images, thoughts, or perceptions. Note: In young children, repetitive play may occur in which themes or aspects of the trauma are expressed.      - Recurrent distressing dreams of the event. Note: In children, there may be frightening dreams without recognizable content.      - Intense psychological distress at exposure to internal or external cues that symbolize or resemble an aspect of the traumatic event.      - Physiological reactivity on exposure to internal or external cues that symbolize or resemble an aspect of the traumatic event.   C. Persistent avoidance of stimuli associated with the trauma and numbing of general responsiveness (not present before the trauma), as indicated by three (or more) of the following:     - Efforts to avoid thoughts, feelings, or conversations associated with the trauma.      - Efforts to avoid activities, places, or people that arouse recollections of the trauma.      - Feeling of detachment or estrangement from others.   D. Persistent symptoms of increased arousal (not present before the trauma), as indicated by two (or more) of the following:     - Difficulty falling or staying asleep.      - Difficulty concentrating.      - Hypervigilance.       - Exaggerated startle response.   E. Duration of the disturbance is more than 1 month.  F. The disturbance causes clinically significant distress or impairment in social, occupational, or other important areas of functioning.      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    Cesar Magaña, ALYSON, [facility], Cesar Magaña 3/05/25                                                         Treatment Plan    Client's Name: Jyothi Pierre  YOB: 1965    Date: 4.7.21; 10.23.24; 2.21.25    Diagnoses: 296.31 (F33.0) Major Depressive Disorder, Recurrent Episode, Mild With anxious distress 309.81 (F43.10) Posttraumatic Stress Disorder (includes Posttraumatic Stress Disorder for Children 6 Years and Younger)  With dissociative symptoms    Referral / Collaboration:  Referral to another professional/service is not indicated at this time.    Anticipated number of session or this episode of care: 13-15      MeasurableTreatment Goal(s) related to diagnosis / functional impairment(s)    Goal 2: Client will increase her understanding and comprehension of PTSD.     I will know I've met my goal when I am better able to tolerate a mix of emotions.       Objective #A (Client Action)                Status: New - Date: 4.7.21; 10.23.24; 2.21.25   =  Client will increasing her understanding of PTSD.     Intervention(s)  Therapist will provide educational materials on  PTSD, including providing vocabulary to describe feelings associated with PTSD.     Objective #B  Client will Identify cues and symptoms related to PTSD.                    Status: New - Date: 4.7.21; 10.23.24; 2.21.25     Intervention(s)  Therapist will provide educational materials on PTSD cues and symptoms.  teach the client how to perform a behavioral chain analysis.     Patient has reviewed and agreed to the above plan.      ALYSON Alejandro, [facility] April 7, 2021; 10.23.24; 2.21.25

## 2025-03-12 ENCOUNTER — VIRTUAL VISIT (OUTPATIENT)
Facility: CLINIC | Age: 60
End: 2025-03-12
Payer: COMMERCIAL

## 2025-03-12 DIAGNOSIS — F43.10 POST TRAUMATIC STRESS DISORDER: Primary | ICD-10-CM

## 2025-03-12 DIAGNOSIS — F33.1 MODERATE EPISODE OF RECURRENT MAJOR DEPRESSIVE DISORDER (H): ICD-10-CM

## 2025-03-12 PROCEDURE — 90837 PSYTX W PT 60 MINUTES: CPT | Mod: 95 | Performed by: SOCIAL WORKER

## 2025-03-12 ASSESSMENT — PATIENT HEALTH QUESTIONNAIRE - PHQ9
SUM OF ALL RESPONSES TO PHQ QUESTIONS 1-9: 7
SUM OF ALL RESPONSES TO PHQ QUESTIONS 1-9: 7
10. IF YOU CHECKED OFF ANY PROBLEMS, HOW DIFFICULT HAVE THESE PROBLEMS MADE IT FOR YOU TO DO YOUR WORK, TAKE CARE OF THINGS AT HOME, OR GET ALONG WITH OTHER PEOPLE: SOMEWHAT DIFFICULT

## 2025-03-12 NOTE — PROGRESS NOTES
Answers submitted by the patient for this visit:  Patient Health Questionnaire (Submitted on 3/12/2025)  If you checked off any problems, how difficult have these problems made it for you to do your work, take care of things at home, or get along with other people?: Somewhat difficult  PHQ9 TOTAL SCORE: 7  Answers submitted by the patient for this visit:  Patient Health Questionnaire (Submitted on 3/5/2025)  If you checked off any problems, how difficult have these problems made it for you to do your work, take care of things at home, or get along with other people?: Somewhat difficult  PHQ9 TOTAL SCORE: 12  Patient Health Questionnaire (G7) (Submitted on 3/5/2025)  DILIA 7 TOTAL SCORE: 12  Answers submitted by the patient for this visit:  Patient Health Questionnaire (Submitted on 1/12/2025)  If you checked off any problems, how difficult have these problems made it for you to do your work, take care of things at home, or get along with other people?: Somewhat difficult  PHQ9 TOTAL SCORE: 14  Answers submitted by the patient for this visit:  Patient Health Questionnaire (G7) (Submitted on 10/18/2024)  DILIA 7 TOTAL SCORE: 7  Answers submitted by the patient for this visit:  Patient Health Questionnaire (Submitted on 10/18/2024)  If you checked off any problems, how difficult have these problems made it for you to do your work, take care of things at home, or get along with other people?: Somewhat difficult  PHQ9 TOTAL SCORE: 8  Patient Health Questionnaire (G7) (Submitted on 10/18/2024)  DILIA 7 TOTAL SCORE: 7                 Progress Note - Visit    Client Name:  Jyothi Pierre Date: 3/12/25           Service Type: Individual     Visit Start Time: 431pm  Visit End Time: 530pm    Time: 59 minutes    Visit #:  12    Attendees: Client attended alone    Service Modality:  Video Visit:      Provider verified identity through the following two step process.  Patient provided:  Patient photo    Telemedicine Visit: The  "patient's condition can be safely assessed and treated via synchronous audio and visual telemedicine encounter.      Reason for Telemedicine Visit: Patient has requested telehealth visit    Originating Site (Patient Location): Patient's home    Distant Site (Provider Location): Provider Remote Setting- Home Office    Consent:  The patient/guardian has verbally consented to: the potential risks and benefits of telemedicine (video visit) versus in person care; bill my insurance or make self-payment for services provided; and responsibility for payment of non-covered services.     Patient would like the video invitation sent by:  My Chart    Mode of Communication:  Video Conference via [facility]    As the provider I attest to compliance with applicable laws and regulations related to telemedicine.       DATA:  Extended Session (53+ minutes): PROLONGED SERVICE IN THE OUTPATIENT SETTING REQUIRING DIRECT (FACE-TO-FACE) PATIENT CONTACT BEYOND THE USUAL SERVICE:    - Longer session due to limited access to mental health appointments and necessity to address patient's distress / complexity   Interactive Complexity: No   Crisis: No     Presenting Concerns/  Current Stressors: Continued holding space for processing related to an ongoing stressful situation at work. Explored how when talking to a school district she asked for clarification, heard clarification and held space for anger based out of not hearing about transparency. Processed her action in advocating for herself, recognized how she did feel the upsetting nature, recognized her taking a chance, didn't respond with \"a pleasing response\", recognized her courage and willingness. Recognized how her external world responded ideally and recognized how this is not always the case so regardless we get to look back and celebrate her having an experience that her values guided her actions. Uitlized the DBT DEAR MAN intervention to practice assertive communication for the " "remainder of the sessio.n    E.D.,     As I've been navigating the student's case I've noticed that we are developing a process for how to respond to that parents emails. As I see it, the parent sends an e-mail to me and I forward it to you and you forward back a response to me which I send to the parent with my name. My concern is that I am signing my name to information I did not write nor do I understand. I would like to seek some clarification from you to better understand the implications of me signing my name.     Could signing my name be to a e-mail be a document that I have not written be problematic for me and my credibility if I am asked to testify in court?  I can understand why you are not sending the e-mails but I don't understand why someone from the legal team isn't sending the e-mail. Can you explain to me further if there is a reason why someone from the legal team can't send the e-mail?    Treatment Objective(s) Addressed in This Session:      Identify thoughts, feelings, sensations, memories  Pendulation using EMDR techniques    Intervention:   EMDR preparation/history taking  Penudulation  Using skills to stay within window of tolerance                                               Jyothi Pierre     SAFETY PLAN:  Step 1: Warning signs / cues (Thoughts, images, mood, situation, behavior) that a crisis may be developing:  Thoughts: \"I don't matter\", \"People would be better off without me\" and \"I'm a burden\"  Images:  thinking about how mother said I failed my sister  Thinking Processes: racing thoughts and highly critical and negative thoughts: I'm not of value to others  Mood: worsening depression  Behaviors:  giving items away  Situations:  others making fun of hearing loss  , feel rejection from her children or   Step 2: Coping strategies - Things I can do to take my mind off of my problems without contacting another person (relaxation technique, physical activity):  Distress Tolerance " Strategies:  paced breathing/progressive muscle relaxation and grounding skills  Physical Activities: go for a walk  Focus on helpful thoughts:  self-compassion statements: I am creating new neruopathways, suicidal thoughts is an older pathway, I will commit to making new ones  Step 3: People and social settings that provide distraction:   Name:    nature    Step 4: Remind myself of people and things that are important to me and worth living for:  I want to model for my kids, 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   I helped develop this safety plan and agree to use it when needed.  I have been given a copy of this plan.      Client signature _________________________________________________________________  Today s date:  5/5/2021  Adapted from Safety Plan Template 2008 Lori Mercado and Juan Luis Vela is reprinted with the express permission of the authors.  No portion of the Safety Plan Template may be reproduced without the express, written permission.  You can contact the authors at [email or web address] or [email or web address].  Interventions   EMDR  ROYGBIV (Front to back of head)  Not deserving to be here --- responsibility clinical theme   Age 3, came home from [employer], looked out window, thought self how do I die, 1, not wanting to be on earth anymore  Age 28 Memory: asking mother when sister  " \"would you have wanted it to be me\", mother did not answer  Currently believing --- it is nothing that I did, parents lack of skills -- adult self knows     ASSESSMENT:  Mental Status Assessment:  Appearance:   Appropriate   Eye Contact:   Good   Psychomotor Behavior: Normal   Attitude:   Cooperative   Orientation:   All  Speech   Rate / Production: Normal/ Responsive   Volume:  Normal   Mood:    Anxious   Affect:    Appropriate   Thought Content:  Clear   Thought Form:  Coherent   Insight:    Fair       Safety Issues and Plan for Safety and Risk Management:     [place] Suicide Severity Rating Scale (Lifetime/Recent)      2012     3:00 AM 2/15/2021     9:00 AM 2021     9:00 AM   [place] Suicide Severity Rating (Lifetime/Recent)   Q1 Wish to be Dead (Lifetime)  Yes Yes   Most Severe Ideation Rating (Lifetime)  3    Frequency (Lifetime)  3    Duration (Lifetime)  3    Controllability (Lifetime)  2    Protective Factors  (Lifetime)  2    Reasons for Ideation (Lifetime)  5    Do you have guns available to you? No     RETIRED: 1. Wish to be Dead (Recent)  No Yes   RETIRED: Wish to be Dead Description (Recent)   Thought of not wanting to be alive   Most Severe Ideation Rating (Past Month)  NA 2   Frequency (Past Month)  NA 2   Duration (Past Month)  NA 2   Controllability (Past Month)  NA 1   Protective Factors (Past Month)  NA 1   Reasons for Ideation (Past Month)  NA 5   Actual Attempt (Lifetime)  No Yes   Actual Attempt Description (Lifetime)   Overdose on medications 5 yeas ago     Patient denies current fears or concerns for personal safety.  Patient denies current or recent suicidal ideation or behaviors.  Patient denies current or recent homicidal ideation or behaviors.  Patient denies current or recent self injurious behavior or ideation.  Patient denies other safety concerns.  Recommended that patient call 911 or go to the local ED should there be a change in any of these risk factors.  Patient " reports there are no firearms in the house.     Diagnostic Criteria:  CRITERIA (A-C) REPRESENT A MAJOR DEPRESSIVE EPISODE - SELECT THESE CRITERIA  A) Recurrent episode(s) - symptoms have been present during the same 2-week period and represent a change from previous functioning 5 or more symptoms (required for diagnosis)   - Depressed mood. Note: In children and adolescents, can be irritable mood.     - Diminished interest or pleasure in all, or almost all, activities.    - Decreased sleep.    - Psychomotor activity agitation.    - Fatigue or loss of energy.    - Feelings of worthlessness or inappropriate and excessive guilt.    - Diminished ability to think or concentrate, or indecisiveness.   B) The symptoms cause clinically significant distress or impairment in social, occupational, or other important areas of functioning  C) The episode is not attributable to the physiological effects of a substance or to another medical condition  D) The occurence of major depressive episode is not better explained by other thought / psychotic disorders  E) There has never been a manic episode or hypomanic episode  A. The person has been exposed to a traumatic event in which both of the following were present:     (1) the person experienced, witnessed, or was confronted with an event or events that involved actual or threatened death or serious injury, or a threat to the physical integrity of self or others     (2) the person's response involved intense fear, helplessness, or horror. Note: In children, this may be expressed instead by disorganized or agitated behavior  B. The traumatic event is persistently reexperienced in one (or more) of the following ways:     - Recurrent and intrusive distressing recollections of the event, including images, thoughts, or perceptions. Note: In young children, repetitive play may occur in which themes or aspects of the trauma are expressed.      - Recurrent distressing dreams of the event.  Note: In children, there may be frightening dreams without recognizable content.      - Intense psychological distress at exposure to internal or external cues that symbolize or resemble an aspect of the traumatic event.      - Physiological reactivity on exposure to internal or external cues that symbolize or resemble an aspect of the traumatic event.   C. Persistent avoidance of stimuli associated with the trauma and numbing of general responsiveness (not present before the trauma), as indicated by three (or more) of the following:     - Efforts to avoid thoughts, feelings, or conversations associated with the trauma.      - Efforts to avoid activities, places, or people that arouse recollections of the trauma.      - Feeling of detachment or estrangement from others.   D. Persistent symptoms of increased arousal (not present before the trauma), as indicated by two (or more) of the following:     - Difficulty falling or staying asleep.      - Difficulty concentrating.      - Hypervigilance.      - Exaggerated startle response.   E. Duration of the disturbance is more than 1 month.  F. The disturbance causes clinically significant distress or impairment in social, occupational, or other important areas of functioning.      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 As I see it, the  parent sends an e-mail to me and I forward it to you and you forward back a response to me which I send to the parent with my name. My concern is that I am signing my name to information I did not write nor do I understand. I would like to seek some clarification from you to better understand the implications of me signing my name.     Could signing my name be to a e-mail be a document that I have not written be problematic for me and my credibility if I am asked to testify in court?  I can understand why you are not sending the e-mails but I don't understand why someone from the legal team isn't sending the e-mail. Can you explain to me further if there is a reason why someone from the legal team can't send the e-mail?    Cesar Magaña, ALYSON, [facility], Cesar Magaña 3/12/25                                                         Treatment Plan    Client's Name: Jyothi Pierre  YOB: 1965    Date: 4.7.21; 10.23.24; 2.21.25    Diagnoses: 296.31 (F33.0) Major Depressive Disorder, Recurrent Episode, Mild With anxious distress 309.81 (F43.10) Posttraumatic Stress Disorder (includes Posttraumatic Stress Disorder for Children 6 Years and Younger)  With dissociative symptoms    Referral / Collaboration:  Referral to another professional/service is not indicated at this time.    Anticipated number of session or this episode of care: 13-15      MeasurableTreatment Goal(s) related to diagnosis / functional impairment(s)    Goal 2: Client will increase her understanding and comprehension of PTSD.     I will know I've met my goal when I am better able to tolerate a mix of emotions.       Objective #A (Client Action)                Status: New - Date: 4.7.21; 10.23.24; 2.21.25   =  Client will increasing her understanding of PTSD.     Intervention(s)  Therapist will provide educational materials on PTSD, including providing vocabulary to describe feelings associated with PTSD.     Objective #B  Client will  Identify cues and symptoms related to PTSD.                    Status: New - Date: 4.7.21; 10.23.24; 2.21.25     Intervention(s)  Therapist will provide educational materials on PTSD cues and symptoms.  teach the client how to perform a behavioral chain analysis.     Patient has reviewed and agreed to the above plan.      ALYSON Alejandro, [facility] April 7, 2021; 10.23.24; 2.21.25

## 2025-03-17 ENCOUNTER — VIRTUAL VISIT (OUTPATIENT)
Facility: CLINIC | Age: 60
End: 2025-03-17
Payer: COMMERCIAL

## 2025-03-17 DIAGNOSIS — F33.1 MODERATE EPISODE OF RECURRENT MAJOR DEPRESSIVE DISORDER (H): ICD-10-CM

## 2025-03-17 DIAGNOSIS — F43.10 POST TRAUMATIC STRESS DISORDER: Primary | ICD-10-CM

## 2025-03-17 PROCEDURE — 90837 PSYTX W PT 60 MINUTES: CPT | Mod: 95 | Performed by: SOCIAL WORKER

## 2025-03-17 NOTE — PROGRESS NOTES
Answers submitted by the patient for this visit:  Patient Health Questionnaire (Submitted on 3/12/2025)  If you checked off any problems, how difficult have these problems made it for you to do your work, take care of things at home, or get along with other people?: Somewhat difficult  PHQ9 TOTAL SCORE: 7  Answers submitted by the patient for this visit:  Patient Health Questionnaire (Submitted on 3/5/2025)  If you checked off any problems, how difficult have these problems made it for you to do your work, take care of things at home, or get along with other people?: Somewhat difficult  PHQ9 TOTAL SCORE: 12  Patient Health Questionnaire (G7) (Submitted on 3/5/2025)  DILIA 7 TOTAL SCORE: 12  Answers submitted by the patient for this visit:  Patient Health Questionnaire (Submitted on 1/12/2025)  If you checked off any problems, how difficult have these problems made it for you to do your work, take care of things at home, or get along with other people?: Somewhat difficult  PHQ9 TOTAL SCORE: 14  Answers submitted by the patient for this visit:  Patient Health Questionnaire (G7) (Submitted on 10/18/2024)  DILIA 7 TOTAL SCORE: 7  Answers submitted by the patient for this visit:  Patient Health Questionnaire (Submitted on 10/18/2024)  If you checked off any problems, how difficult have these problems made it for you to do your work, take care of things at home, or get along with other people?: Somewhat difficult  PHQ9 TOTAL SCORE: 8  Patient Health Questionnaire (G7) (Submitted on 10/18/2024)  DILIA 7 TOTAL SCORE: 7                 Progress Note - Visit    Client Name:  Jyothi Pierre Date: 3/17/25           Service Type: Individual     Visit Start Time: 432pm  Visit End Time: 530pm    Time: 59 minutes    Visit #:  13    Attendees: Client attended alone    Service Modality:  Video Visit:      Provider verified identity through the following two step process.  Patient provided:  Patient photo    Telemedicine Visit: The  patient's condition can be safely assessed and treated via synchronous audio and visual telemedicine encounter.      Reason for Telemedicine Visit: Patient has requested telehealth visit    Originating Site (Patient Location): Patient's home    Distant Site (Provider Location): Provider Remote Setting- Home Office    Consent:  The patient/guardian has verbally consented to: the potential risks and benefits of telemedicine (video visit) versus in person care; bill my insurance or make self-payment for services provided; and responsibility for payment of non-covered services.     Patient would like the video invitation sent by:  My Chart    Mode of Communication:  Video Conference via [employer]    As the provider I attest to compliance with applicable laws and regulations related to telemedicine.       DATA:  Extended Session (53+ minutes): PROLONGED SERVICE IN THE OUTPATIENT SETTING REQUIRING DIRECT (FACE-TO-FACE) PATIENT CONTACT BEYOND THE USUAL SERVICE:    - Longer session due to limited access to mental health appointments and necessity to address patient's distress / complexity   Interactive Complexity: No   Crisis: No     Presenting Concerns/  Current Stressors: Processed pt's experience of feeling a sense of defeat. Processed a situation at school that brought upon this feeling compounding with an already ongoing stressful situation. Utilized a stress based approach to help her take a new perspective in validating her response by recognizing she acted out of a value of modeling and respect. Helped to open space to validate how she had judgements and yet acted with the value of trauma informed care. Utilized problem and solution based therapy to develop a plan for a letter to be written in support of her not taking on a case that has a high likeliness of increasing symptoms. For the remainder of the session, utilized grounding skills as xyen noticed she was moving toward dissociation, utilized sense of smell and  "movement to bring her more back to the middle of the window of tolerance.    Treatment Objective(s) Addressed in This Session:      Identify thoughts, feelings, sensations, memories  Pendulation using EMDR techniques    Intervention:   EMDR preparation/history taking  Penudulation  Using skills to stay within window of tolerance                                               Jyothi Pierre     SAFETY PLAN:  Step 1: Warning signs / cues (Thoughts, images, mood, situation, behavior) that a crisis may be developing:  Thoughts: \"I don't matter\", \"People would be better off without me\" and \"I'm a burden\"  Images:  thinking about how mother said I failed my sister  Thinking Processes: racing thoughts and highly critical and negative thoughts: I'm not of value to others  Mood: worsening depression  Behaviors:  giving items away  Situations:  others making fun of hearing loss  , feel rejection from her children or   Step 2: Coping strategies - Things I can do to take my mind off of my problems without contacting another person (relaxation technique, physical activity):  Distress Tolerance Strategies:  paced breathing/progressive muscle relaxation and grounding skills  Physical Activities: go for a walk  Focus on helpful thoughts:  self-compassion statements: I am creating new neruopathways, suicidal thoughts is an older pathway, I will commit to making new ones  Step 3: People and social settings that provide distraction:   Name:    nature    Step 4: Remind myself of people and things that are important to me and worth living for:  I want to model for my kids, 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   I helped develop this safety plan and agree to use it when needed.  I have been given a copy of this plan.      Client signature _________________________________________________________________  Today s date:  2021  Adapted from Safety Plan Template 2008 Lori Mercado and Juan Luis Vela is reprinted with the express permission of the authors.  No portion of the Safety Plan Template may be reproduced without the express, written permission.  You can contact the authors at [email or web address] or [email or web address].  Interventions   EMDR  ROYGBIV (Front to back of head)  Not deserving to be here --- responsibility clinical theme   Age 3, came home from Baptism, looked out window, thought self how do I die, 1, not wanting to be on earth anymore  Age 28 Memory: asking mother when sister  \"would you have wanted it to be me\", mother did not answer  Currently believing --- it is nothing that I did, parents lack of skills -- adult self knows     ASSESSMENT:  Mental Status Assessment:  Appearance:   Appropriate   Eye Contact:   Good   Psychomotor Behavior: Normal   Attitude:   Cooperative   Orientation:   All  Speech   Rate / Production: Normal/ Responsive   Volume:  Normal   Mood:    Anxious   Affect:    Appropriate   Thought Content:  Clear   Thought Form:  Coherent   Insight:    Fair       Safety Issues and Plan for Safety and Risk Management:     Hodgeman Suicide Severity Rating Scale (Lifetime/Recent)      2012     3:00 AM 2/15/2021     9:00 AM 2021     9:00 AM   Hodgeman Suicide Severity Rating (Lifetime/Recent)   Q1 Wish to be Dead (Lifetime)  Yes Yes   Most Severe Ideation Rating (Lifetime)  3  "   Frequency (Lifetime)  3    Duration (Lifetime)  3    Controllability (Lifetime)  2    Protective Factors  (Lifetime)  2    Reasons for Ideation (Lifetime)  5    Do you have guns available to you? No     RETIRED: 1. Wish to be Dead (Recent)  No Yes   RETIRED: Wish to be Dead Description (Recent)   Thought of not wanting to be alive   Most Severe Ideation Rating (Past Month)  NA 2   Frequency (Past Month)  NA 2   Duration (Past Month)  NA 2   Controllability (Past Month)  NA 1   Protective Factors (Past Month)  NA 1   Reasons for Ideation (Past Month)  NA 5   Actual Attempt (Lifetime)  No Yes   Actual Attempt Description (Lifetime)   Overdose on medications 5 yeas ago     Patient denies current fears or concerns for personal safety.  Patient denies current or recent suicidal ideation or behaviors.  Patient denies current or recent homicidal ideation or behaviors.  Patient denies current or recent self injurious behavior or ideation.  Patient denies other safety concerns.  Recommended that patient call 911 or go to the local ED should there be a change in any of these risk factors.  Patient reports there are no firearms in the house.     Diagnostic Criteria:  CRITERIA (A-C) REPRESENT A MAJOR DEPRESSIVE EPISODE - SELECT THESE CRITERIA  A) Recurrent episode(s) - symptoms have been present during the same 2-week period and represent a change from previous functioning 5 or more symptoms (required for diagnosis)   - Depressed mood. Note: In children and adolescents, can be irritable mood.     - Diminished interest or pleasure in all, or almost all, activities.    - Decreased sleep.    - Psychomotor activity agitation.    - Fatigue or loss of energy.    - Feelings of worthlessness or inappropriate and excessive guilt.    - Diminished ability to think or concentrate, or indecisiveness.   B) The symptoms cause clinically significant distress or impairment in social, occupational, or other important areas of functioning  C)  The episode is not attributable to the physiological effects of a substance or to another medical condition  D) The occurence of major depressive episode is not better explained by other thought / psychotic disorders  E) There has never been a manic episode or hypomanic episode  A. The person has been exposed to a traumatic event in which both of the following were present:     (1) the person experienced, witnessed, or was confronted with an event or events that involved actual or threatened death or serious injury, or a threat to the physical integrity of self or others     (2) the person's response involved intense fear, helplessness, or horror. Note: In children, this may be expressed instead by disorganized or agitated behavior  B. The traumatic event is persistently reexperienced in one (or more) of the following ways:     - Recurrent and intrusive distressing recollections of the event, including images, thoughts, or perceptions. Note: In young children, repetitive play may occur in which themes or aspects of the trauma are expressed.      - Recurrent distressing dreams of the event. Note: In children, there may be frightening dreams without recognizable content.      - Intense psychological distress at exposure to internal or external cues that symbolize or resemble an aspect of the traumatic event.      - Physiological reactivity on exposure to internal or external cues that symbolize or resemble an aspect of the traumatic event.   C. Persistent avoidance of stimuli associated with the trauma and numbing of general responsiveness (not present before the trauma), as indicated by three (or more) of the following:     - Efforts to avoid thoughts, feelings, or conversations associated with the trauma.      - Efforts to avoid activities, places, or people that arouse recollections of the trauma.      - Feeling of detachment or estrangement from others.   D. Persistent symptoms of increased arousal (not present  before the trauma), as indicated by two (or more) of the following:     - Difficulty falling or staying asleep.      - Difficulty concentrating.      - Hypervigilance.      - Exaggerated startle response.   E. Duration of the disturbance is more than 1 month.  F. The disturbance causes clinically significant distress or impairment in social, occupational, or other important areas of functioning.      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    Cesar Magaña, ALYSON, [facility], Cesar Magaña 3/17/25                                                         Treatment Plan    Client's Name: Jyothi Pierre  YOB: 1965    Date: 4.7.21; 10.23.24; 2.21.25    Diagnoses: 296.31 (F33.0) Major Depressive Disorder, Recurrent Episode, Mild With anxious distress 309.81 (F43.10) Posttraumatic Stress Disorder (includes Posttraumatic Stress Disorder for Children 6 Years and Younger)  With dissociative symptoms    Referral / Collaboration:  Referral to another professional/service is not indicated at this time.    Anticipated number of session or this episode of care: 13-15      MeasurableTreatment Goal(s) related to diagnosis / functional impairment(s)    Goal 2: Client will increase her understanding and comprehension of PTSD.     I will know I've met my goal when I am better able to tolerate a mix of emotions.       Objective #A (Client Action)                 Status: New - Date: 4.7.21; 10.23.24; 2.21.25   =  Client will increasing her understanding of PTSD.     Intervention(s)  Therapist will provide educational materials on PTSD, including providing vocabulary to describe feelings associated with PTSD.     Objective #B  Client will Identify cues and symptoms related to PTSD.                    Status: New - Date: 4.7.21; 10.23.24; 2.21.25     Intervention(s)  Therapist will provide educational materials on PTSD cues and symptoms.  teach the client how to perform a behavioral chain analysis.     Patient has reviewed and agreed to the above plan.      ALYSON Alejandro, LGSW April 7, 2021; 10.23.24; 2.21.25

## 2025-03-22 ENCOUNTER — HEALTH MAINTENANCE LETTER (OUTPATIENT)
Age: 60
End: 2025-03-22

## 2025-04-10 ENCOUNTER — VIRTUAL VISIT (OUTPATIENT)
Facility: CLINIC | Age: 60
End: 2025-04-10
Payer: COMMERCIAL

## 2025-04-10 DIAGNOSIS — F43.10 POST TRAUMATIC STRESS DISORDER: Primary | ICD-10-CM

## 2025-04-10 DIAGNOSIS — F33.1 MODERATE EPISODE OF RECURRENT MAJOR DEPRESSIVE DISORDER (H): ICD-10-CM

## 2025-04-10 NOTE — PROGRESS NOTES
Answers submitted by the patient for this visit:  Patient Health Questionnaire (Submitted on 3/12/2025)  If you checked off any problems, how difficult have these problems made it for you to do your work, take care of things at home, or get along with other people?: Somewhat difficult  PHQ9 TOTAL SCORE: 7  Answers submitted by the patient for this visit:  Patient Health Questionnaire (Submitted on 3/5/2025)  If you checked off any problems, how difficult have these problems made it for you to do your work, take care of things at home, or get along with other people?: Somewhat difficult  PHQ9 TOTAL SCORE: 12  Patient Health Questionnaire (G7) (Submitted on 3/5/2025)  DILIA 7 TOTAL SCORE: 12  Answers submitted by the patient for this visit:  Patient Health Questionnaire (Submitted on 1/12/2025)  If you checked off any problems, how difficult have these problems made it for you to do your work, take care of things at home, or get along with other people?: Somewhat difficult  PHQ9 TOTAL SCORE: 14  Answers submitted by the patient for this visit:  Patient Health Questionnaire (G7) (Submitted on 10/18/2024)  DILIA 7 TOTAL SCORE: 7  Answers submitted by the patient for this visit:  Patient Health Questionnaire (Submitted on 10/18/2024)  If you checked off any problems, how difficult have these problems made it for you to do your work, take care of things at home, or get along with other people?: Somewhat difficult  PHQ9 TOTAL SCORE: 8  Patient Health Questionnaire (G7) (Submitted on 10/18/2024)  DILIA 7 TOTAL SCORE: 7                 Progress Note - Visit    Client Name:  Jyothi Pierre Date: 4/10/25           Service Type: Individual     Visit Start Time: 235pm  Visit End Time: 330pm    Time: 55 minutes    Visit #:  14    Attendees: Client attended alone    Service Modality:  Video Visit:      Provider verified identity through the following two step process.  Patient provided:  Patient photo    Telemedicine Visit: The  patient's condition can be safely assessed and treated via synchronous audio and visual telemedicine encounter.      Reason for Telemedicine Visit: Patient has requested telehealth visit    Originating Site (Patient Location): Patient's home    Distant Site (Provider Location): Provider Remote Setting- Home Office    Consent:  The patient/guardian has verbally consented to: the potential risks and benefits of telemedicine (video visit) versus in person care; bill my insurance or make self-payment for services provided; and responsibility for payment of non-covered services.     Patient would like the video invitation sent by:  My Chart    Mode of Communication:  Video Conference via [employer]    As the provider I attest to compliance with applicable laws and regulations related to telemedicine.       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 Also processed her thoughts, feelings, and reactions regarding a meeting with a student's parents. Developed the following e-mail using the dbt dear man strategy:     Thank you for your responsiveness in alleviating the stress of me having the respond to these e-mails from parent. 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 I want to get clarification from you about my role in any neccessary response to this parent. For example, will I be asked to sign my name to any responses that have been written by either admin or the ?    For the remainder of the session, processed  "her thoughts, feelings, and reactions regarding a difficult case within school, utilized a behavioral chain to help fully process this experience paying attention to thoughts, feelings, sensations, and reactions.     Treatment Objective(s) Addressed in This Session:      Identify thoughts, feelings, sensations, memories  Pendulation using EMDR techniques    Intervention:   EMDR preparation/history taking  Penudulation  Using skills to stay within window of tolerance                                               Jyothi Pierre     SAFETY PLAN:  Step 1: Warning signs / cues (Thoughts, images, mood, situation, behavior) that a crisis may be developing:  Thoughts: \"I don't matter\", \"People would be better off without me\" and \"I'm a burden\"  Images:  thinking about how mother said I failed my sister  Thinking Processes: racing thoughts and highly critical and negative thoughts: I'm not of value to others  Mood: worsening depression  Behaviors:  giving items away  Situations:  others making fun of hearing loss  , feel rejection from her children or   Step 2: Coping strategies - Things I can do to take my mind off of my problems without contacting another person (relaxation technique, physical activity):  Distress Tolerance Strategies:  paced breathing/progressive muscle relaxation and grounding skills  Physical Activities: go for a walk  Focus on helpful thoughts:  self-compassion statements: I am creating new neruopathways, suicidal thoughts is an older pathway, I will commit to making new ones  Step 3: People and social settings that provide distraction:   Name:    nature    Step 4: Remind myself of people and things that are important to me and worth living for:  I want to model for my kids, 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   I helped develop this safety plan and agree to use it when needed.  I have been given a copy of this plan.      Client signature _________________________________________________________________  Today s date:  2021  Adapted from Safety Plan Template 2008 Lori Mercado and Juan Luis Vela is reprinted with the express permission of the authors.  No portion of the Safety Plan Template may be reproduced without the express, written permission.  You can contact the authors at [email or web address] or [email or web address].  Interventions   EMDR  ROYGBIV (Front to back of head)  Not deserving to be here --- responsibility clinical theme   Age 3, came home from [employer], looked out window, thought self how do I die, 1, not wanting to be on earth anymore  Age 28 Memory: asking mother when sister  \"would you have wanted it to be me\", mother did not answer  Currently believing --- it is nothing that I did, parents lack of skills -- adult self knows     ASSESSMENT:  Mental Status Assessment:  Appearance:   Appropriate   Eye Contact:   Good   Psychomotor Behavior: Normal   Attitude:   Cooperative   Orientation:   All  Speech   Rate / Production: Normal/ Responsive   Volume:  Normal   Mood:    Anxious   Affect:    Appropriate   Thought Content:  Clear   Thought Form:  Coherent   Insight:    Fair       Safety Issues and Plan for Safety and Risk Management:     [place] Suicide Severity Rating Scale (Lifetime/Recent)      2012     3:00 AM 2/15/2021     9:00 AM 2021     " 9:00 AM   [place] Suicide Severity Rating (Lifetime/Recent)   Q1 Wish to be Dead (Lifetime)  Yes Yes   Most Severe Ideation Rating (Lifetime)  3    Frequency (Lifetime)  3    Duration (Lifetime)  3    Controllability (Lifetime)  2    Protective Factors  (Lifetime)  2    Reasons for Ideation (Lifetime)  5    Do you have guns available to you? No     RETIRED: 1. Wish to be Dead (Recent)  No Yes   RETIRED: Wish to be Dead Description (Recent)   Thought of not wanting to be alive   Most Severe Ideation Rating (Past Month)  NA 2   Frequency (Past Month)  NA 2   Duration (Past Month)  NA 2   Controllability (Past Month)  NA 1   Protective Factors (Past Month)  NA 1   Reasons for Ideation (Past Month)  NA 5   Actual Attempt (Lifetime)  No Yes   Actual Attempt Description (Lifetime)   Overdose on medications 5 yeas ago     Patient denies current fears or concerns for personal safety.  Patient denies current or recent suicidal ideation or behaviors.  Patient denies current or recent homicidal ideation or behaviors.  Patient denies current or recent self injurious behavior or ideation.  Patient denies other safety concerns.  Recommended that patient call 911 or go to the local ED should there be a change in any of these risk factors.  Patient reports there are no firearms in the house.     Diagnostic Criteria:  CRITERIA (A-C) REPRESENT A MAJOR DEPRESSIVE EPISODE - SELECT THESE CRITERIA  A) Recurrent episode(s) - symptoms have been present during the same 2-week period and represent a change from previous functioning 5 or more symptoms (required for diagnosis)   - Depressed mood. Note: In children and adolescents, can be irritable mood.     - Diminished interest or pleasure in all, or almost all, activities.    - Decreased sleep.    - Psychomotor activity agitation.    - Fatigue or loss of energy.    - Feelings of worthlessness or inappropriate and excessive guilt.    - Diminished ability to think or concentrate, or  indecisiveness.   B) The symptoms cause clinically significant distress or impairment in social, occupational, or other important areas of functioning  C) The episode is not attributable to the physiological effects of a substance or to another medical condition  D) The occurence of major depressive episode is not better explained by other thought / psychotic disorders  E) There has never been a manic episode or hypomanic episode  A. The person has been exposed to a traumatic event in which both of the following were present:     (1) the person experienced, witnessed, or was confronted with an event or events that involved actual or threatened death or serious injury, or a threat to the physical integrity of self or others     (2) the person's response involved intense fear, helplessness, or horror. Note: In children, this may be expressed instead by disorganized or agitated behavior  B. The traumatic event is persistently reexperienced in one (or more) of the following ways:     - Recurrent and intrusive distressing recollections of the event, including images, thoughts, or perceptions. Note: In young children, repetitive play may occur in which themes or aspects of the trauma are expressed.      - Recurrent distressing dreams of the event. Note: In children, there may be frightening dreams without recognizable content.      - Intense psychological distress at exposure to internal or external cues that symbolize or resemble an aspect of the traumatic event.      - Physiological reactivity on exposure to internal or external cues that symbolize or resemble an aspect of the traumatic event.   C. Persistent avoidance of stimuli associated with the trauma and numbing of general responsiveness (not present before the trauma), as indicated by three (or more) of the following:     - Efforts to avoid thoughts, feelings, or conversations associated with the trauma.      - Efforts to avoid activities, places, or people that  arouse recollections of the trauma.      - Feeling of detachment or estrangement from others.   D. Persistent symptoms of increased arousal (not present before the trauma), as indicated by two (or more) of the following:     - Difficulty falling or staying asleep.      - Difficulty concentrating.      - Hypervigilance.      - Exaggerated startle response.   E. Duration of the disturbance is more than 1 month.  F. The disturbance causes clinically significant distress or impairment in social, occupational, or other important areas of functioning.      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 I want to get clarification from you about my role in any neccessary response to this parent. For example, will I be asked to sign my name to any responses that have been written by either admin or the ?    Cesar Magaña, ALYSON, [facility], Cesar Magaña 4/10/25                                                         Treatment Plan    Client's Name: Jyothi Pierre  YOB: 1965    Date: 4.7.21; 10.23.24; 2.21.25    Diagnoses: 296.31 (F33.0) Major Depressive Disorder, Recurrent Episode, Mild With anxious distress 309.81 (F43.10) Posttraumatic Stress Disorder (includes Posttraumatic Stress  Disorder for Children 6 Years and Younger)  With dissociative symptoms    Referral / Collaboration:  Referral to another professional/service is not indicated at this time.    Anticipated number of session or this episode of care: 13-15      MeasurableTreatment Goal(s) related to diagnosis / functional impairment(s)    Goal 2: Client will increase her understanding and comprehension of PTSD.     I will know I've met my goal when I am better able to tolerate a mix of emotions.       Objective #A (Client Action)                Status: New - Date: 4.7.21; 10.23.24; 2.21.25   =  Client will increasing her understanding of PTSD.     Intervention(s)  Therapist will provide educational materials on PTSD, including providing vocabulary to describe feelings associated with PTSD.     Objective #B  Client will Identify cues and symptoms related to PTSD.                    Status: New - Date: 4.7.21; 10.23.24; 2.21.25     Intervention(s)  Therapist will provide educational materials on PTSD cues and symptoms.  teach the client how to perform a behavioral chain analysis.     Patient has reviewed and agreed to the above plan.      ALYSON Alejandro, [facility] April 7, 2021; 10.23.24; 2.21.25

## 2025-04-30 ENCOUNTER — VIRTUAL VISIT (OUTPATIENT)
Facility: CLINIC | Age: 60
End: 2025-04-30
Payer: COMMERCIAL

## 2025-04-30 DIAGNOSIS — F43.10 POST TRAUMATIC STRESS DISORDER: Primary | ICD-10-CM

## 2025-04-30 DIAGNOSIS — F33.1 MODERATE EPISODE OF RECURRENT MAJOR DEPRESSIVE DISORDER (H): ICD-10-CM

## 2025-04-30 PROCEDURE — 90837 PSYTX W PT 60 MINUTES: CPT | Mod: 95 | Performed by: SOCIAL WORKER

## 2025-04-30 ASSESSMENT — PATIENT HEALTH QUESTIONNAIRE - PHQ9
SUM OF ALL RESPONSES TO PHQ QUESTIONS 1-9: 5
10. IF YOU CHECKED OFF ANY PROBLEMS, HOW DIFFICULT HAVE THESE PROBLEMS MADE IT FOR YOU TO DO YOUR WORK, TAKE CARE OF THINGS AT HOME, OR GET ALONG WITH OTHER PEOPLE: SOMEWHAT DIFFICULT
SUM OF ALL RESPONSES TO PHQ QUESTIONS 1-9: 5

## 2025-04-30 NOTE — PROGRESS NOTES
Answers submitted by the patient for this visit:  Patient Health Questionnaire (Submitted on 4/30/2025)  If you checked off any problems, how difficult have these problems made it for you to do your work, take care of things at home, or get along with other people?: Somewhat difficult  PHQ9 TOTAL SCORE: 5  Answers submitted by the patient for this visit:  Patient Health Questionnaire (Submitted on 3/12/2025)  If you checked off any problems, how difficult have these problems made it for you to do your work, take care of things at home, or get along with other people?: Somewhat difficult  PHQ9 TOTAL SCORE: 7  Answers submitted by the patient for this visit:  Patient Health Questionnaire (Submitted on 3/5/2025)  If you checked off any problems, how difficult have these problems made it for you to do your work, take care of things at home, or get along with other people?: Somewhat difficult  PHQ9 TOTAL SCORE: 12  Patient Health Questionnaire (G7) (Submitted on 3/5/2025)  DILIA 7 TOTAL SCORE: 12  Answers submitted by the patient for this visit:  Patient Health Questionnaire (Submitted on 1/12/2025)  If you checked off any problems, how difficult have these problems made it for you to do your work, take care of things at home, or get along with other people?: Somewhat difficult  PHQ9 TOTAL SCORE: 14  Answers submitted by the patient for this visit:  Patient Health Questionnaire (G7) (Submitted on 10/18/2024)  DILIA 7 TOTAL SCORE: 7  Answers submitted by the patient for this visit:  Patient Health Questionnaire (Submitted on 10/18/2024)  If you checked off any problems, how difficult have these problems made it for you to do your work, take care of things at home, or get along with other people?: Somewhat difficult  PHQ9 TOTAL SCORE: 8  Patient Health Questionnaire (G7) (Submitted on 10/18/2024)  DILIA 7 TOTAL SCORE: 7                 Progress Note - Visit    Client Name:  Jyothi Salcedomanpreet Date: 4/30/25           Service  Type: Individual     Visit Start Time: 430pm  Visit End Time: 525pm    Time: 55 minutes    Visit #:  15    Attendees: Client attended alone    Service Modality:  Video Visit:      Provider verified identity through the following two step process.  Patient provided:  Patient photo    Telemedicine Visit: The patient's condition can be safely assessed and treated via synchronous audio and visual telemedicine encounter.      Reason for Telemedicine Visit: Patient has requested telehealth visit    Originating Site (Patient Location): Patient's home    Distant Site (Provider Location): Provider Remote Setting- Home Office    Consent:  The patient/guardian has verbally consented to: the potential risks and benefits of telemedicine (video visit) versus in person care; bill my insurance or make self-payment for services provided; and responsibility for payment of non-covered services.     Patient would like the video invitation sent by:  My Chart    Mode of Communication:  Video Conference via [employer]    As the provider I attest to compliance with applicable laws and regulations related to telemedicine.       DATA:  Extended Session (53+ minutes): PROLONGED SERVICE IN THE OUTPATIENT SETTING REQUIRING DIRECT (FACE-TO-FACE) PATIENT CONTACT BEYOND THE USUAL SERVICE:    - Longer session due to limited access to mental health appointments and necessity to address patient's distress / complexity   Interactive Complexity: No   Crisis: No     Presenting Concerns/  Current Stressors: Processed her thoughts, feelings, and reactions to a stressful experience with her daughter being diagnosed with cancer. Explored actions she has taken to be supportive/helpful including moving closer toward her daughter. Processed the steps that she has taken the make this happen. Also processed her thoughts, feelings, and reactions regarding a difficult case at school. Practiced therapeutic empathetic listening skills and emotional validation. Processed  "her internal experience of an anxious worry of \"am I going to have to come back and testify.\" For the remainder of the session, processed a reminder of a past traumatic experience with a present day experience. Helped identify thoughts and emotion (disgust) through bodily expression. Recognized when noticing experiencing a sense of dissociation and encouraged her to recognize herself for identify how to respond (took drink of lemonade). Discussed how this led to a belief that \"I don't need to stay there and this helps me get back into the present moment.\"     Treatment Objective(s) Addressed in This Session:      Identify thoughts, feelings, sensations, memories  Pendulation using EMDR techniques    Intervention:   EMDR preparation/history taking  Penudulation  Using skills to stay within window of tolerance                                               Jyothi Pierre     SAFETY PLAN:  Step 1: Warning signs / cues (Thoughts, images, mood, situation, behavior) that a crisis may be developing:  Thoughts: \"I don't matter\", \"People would be better off without me\" and \"I'm a burden\"  Images:  thinking about how mother said I failed my sister  Thinking Processes: racing thoughts and highly critical and negative thoughts: I'm not of value to others  Mood: worsening depression  Behaviors:  giving items away  Situations:  others making fun of hearing loss  , feel rejection from her children or   Step 2: Coping strategies - Things I can do to take my mind off of my problems without contacting another person (relaxation technique, physical activity):  Distress Tolerance Strategies:  paced breathing/progressive muscle relaxation and grounding skills  Physical Activities: go for a walk  Focus on helpful thoughts:  self-compassion statements: I am creating new neruopathways, suicidal thoughts is an older pathway, I will commit to making new ones  Step 3: People and social settings that provide distraction:   Name: " "   nature    Step 4: Remind myself of people and things that are important to me and worth living for:  I want to model for my kids, 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   I helped develop this safety plan and agree to use it when needed.  I have been given a copy of this plan.      Client signature _________________________________________________________________  Today s date:  2021  Adapted from Safety Plan Template 2008 Lori Mercado and Juan Luis Vela is reprinted with the express permission of the authors.  No portion of the Safety Plan Template may be reproduced without the express, written permission.  You can contact the authors at [email or web address] or [email or web address].  Interventions   EMDR  ROYGBIV (Front to back of head)  Not deserving to be here --- responsibility clinical theme   Age 3, came home from [employer], looked out window, thought self how do I die, 1, not wanting to be on earth anymore  Age 28 Memory: asking mother when sister  \"would you have wanted it to be me\", mother did not answer  Currently believing --- it is nothing that I did, parents lack of skills -- adult self knows     ASSESSMENT:  Mental Status Assessment:  Appearance:   Appropriate   Eye Contact:   Good   Psychomotor Behavior: Normal   Attitude:   Cooperative   Orientation:   All  Speech   Rate / " Production: Normal/ Responsive   Volume:  Normal   Mood:    Anxious   Affect:    Appropriate   Thought Content:  Clear   Thought Form:  Coherent   Insight:    Fair       Safety Issues and Plan for Safety and Risk Management:     [place] Suicide Severity Rating Scale (Lifetime/Recent)      6/30/2012     3:00 AM 2/15/2021     9:00 AM 5/5/2021     9:00 AM   [place] Suicide Severity Rating (Lifetime/Recent)   Q1 Wish to be Dead (Lifetime)  Yes Yes   Most Severe Ideation Rating (Lifetime)  3    Frequency (Lifetime)  3    Duration (Lifetime)  3    Controllability (Lifetime)  2    Protective Factors  (Lifetime)  2    Reasons for Ideation (Lifetime)  5    Do you have guns available to you? No     RETIRED: 1. Wish to be Dead (Recent)  No Yes   RETIRED: Wish to be Dead Description (Recent)   Thought of not wanting to be alive   Most Severe Ideation Rating (Past Month)  NA 2   Frequency (Past Month)  NA 2   Duration (Past Month)  NA 2   Controllability (Past Month)  NA 1   Protective Factors (Past Month)  NA 1   Reasons for Ideation (Past Month)  NA 5   Actual Attempt (Lifetime)  No Yes   Actual Attempt Description (Lifetime)   Overdose on medications 5 yeas ago     Patient denies current fears or concerns for personal safety.  Patient denies current or recent suicidal ideation or behaviors.  Patient denies current or recent homicidal ideation or behaviors.  Patient denies current or recent self injurious behavior or ideation.  Patient denies other safety concerns.  Recommended that patient call 911 or go to the local ED should there be a change in any of these risk factors.  Patient reports there are no firearms in the house.     Diagnostic Criteria:  CRITERIA (A-C) REPRESENT A MAJOR DEPRESSIVE EPISODE - SELECT THESE CRITERIA  A) Recurrent episode(s) - symptoms have been present during the same 2-week period and represent a change from previous functioning 5 or more symptoms (required for diagnosis)   - Depressed mood.  Note: In children and adolescents, can be irritable mood.     - Diminished interest or pleasure in all, or almost all, activities.    - Decreased sleep.    - Psychomotor activity agitation.    - Fatigue or loss of energy.    - Feelings of worthlessness or inappropriate and excessive guilt.    - Diminished ability to think or concentrate, or indecisiveness.   B) The symptoms cause clinically significant distress or impairment in social, occupational, or other important areas of functioning  C) The episode is not attributable to the physiological effects of a substance or to another medical condition  D) The occurence of major depressive episode is not better explained by other thought / psychotic disorders  E) There has never been a manic episode or hypomanic episode  A. The person has been exposed to a traumatic event in which both of the following were present:     (1) the person experienced, witnessed, or was confronted with an event or events that involved actual or threatened death or serious injury, or a threat to the physical integrity of self or others     (2) the person's response involved intense fear, helplessness, or horror. Note: In children, this may be expressed instead by disorganized or agitated behavior  B. The traumatic event is persistently reexperienced in one (or more) of the following ways:     - Recurrent and intrusive distressing recollections of the event, including images, thoughts, or perceptions. Note: In young children, repetitive play may occur in which themes or aspects of the trauma are expressed.      - Recurrent distressing dreams of the event. Note: In children, there may be frightening dreams without recognizable content.      - Intense psychological distress at exposure to internal or external cues that symbolize or resemble an aspect of the traumatic event.      - Physiological reactivity on exposure to internal or external cues that symbolize or resemble an aspect of the  traumatic event.   C. Persistent avoidance of stimuli associated with the trauma and numbing of general responsiveness (not present before the trauma), as indicated by three (or more) of the following:     - Efforts to avoid thoughts, feelings, or conversations associated with the trauma.      - Efforts to avoid activities, places, or people that arouse recollections of the trauma.      - Feeling of detachment or estrangement from others.   D. Persistent symptoms of increased arousal (not present before the trauma), as indicated by two (or more) of the following:     - Difficulty falling or staying asleep.      - Difficulty concentrating.      - Hypervigilance.      - Exaggerated startle response.   E. Duration of the disturbance is more than 1 month.  F. The disturbance causes clinically significant distress or impairment in social, occupational, or other important areas of functioning.      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 I want to get clarification from you about my role in any neccessary response to this parent. For example, will I be asked to sign my name to any responses that have been written by either admin or the ?    Cesar Magaña, ALYSON, LICSW, Cesar Magaña  4/30/25                                                         Treatment Plan    Client's Name: Jyothi Pierre  YOB: 1965    Date: 4.7.21; 10.23.24; 2.21.25    Diagnoses: 296.31 (F33.0) Major Depressive Disorder, Recurrent Episode, Mild With anxious distress 309.81 (F43.10) Posttraumatic Stress Disorder (includes Posttraumatic Stress Disorder for Children 6 Years and Younger)  With dissociative symptoms    Referral / Collaboration:  Referral to another professional/service is not indicated at this time.    Anticipated number of session or this episode of care: 13-15      MeasurableTreatment Goal(s) related to diagnosis / functional impairment(s)    Goal 2: Client will increase her understanding and comprehension of PTSD.     I will know I've met my goal when I am better able to tolerate a mix of emotions.       Objective #A (Client Action)                Status: New - Date: 4.7.21; 10.23.24; 2.21.25   =  Client will increasing her understanding of PTSD.     Intervention(s)  Therapist will provide educational materials on PTSD, including providing vocabulary to describe feelings associated with PTSD.     Objective #B  Client will Identify cues and symptoms related to PTSD.                    Status: New - Date: 4.7.21; 10.23.24; 2.21.25     Intervention(s)  Therapist will provide educational materials on PTSD cues and symptoms.  teach the client how to perform a behavioral chain analysis.     Patient has reviewed and agreed to the above plan.      ALYSON Alejandro, [facility] April 7, 2021; 10.23.24; 2.21.25

## 2025-06-16 ENCOUNTER — VIRTUAL VISIT (OUTPATIENT)
Dept: PSYCHIATRY | Facility: CLINIC | Age: 60
End: 2025-06-16
Attending: NURSE PRACTITIONER
Payer: COMMERCIAL

## 2025-06-16 DIAGNOSIS — F90.9 ATTENTION DEFICIT HYPERACTIVITY DISORDER (ADHD), UNSPECIFIED ADHD TYPE: ICD-10-CM

## 2025-06-16 DIAGNOSIS — F33.0 MAJOR DEPRESSIVE DISORDER, RECURRENT EPISODE, MILD: ICD-10-CM

## 2025-06-16 RX ORDER — ESCITALOPRAM OXALATE 20 MG/1
20 TABLET ORAL DAILY
Qty: 90 TABLET | Refills: 0 | Status: SHIPPED | OUTPATIENT
Start: 2025-06-16

## 2025-06-16 RX ORDER — METHYLPHENIDATE HYDROCHLORIDE EXTENDED RELEASE 10 MG/1
TABLET ORAL
Qty: 90 TABLET | Refills: 0 | Status: SHIPPED | OUTPATIENT
Start: 2025-06-18

## 2025-06-16 RX ORDER — METHYLPHENIDATE HYDROCHLORIDE EXTENDED RELEASE 10 MG/1
10 TABLET ORAL 3 TIMES DAILY PRN
Qty: 90 TABLET | Refills: 0 | Status: SHIPPED | OUTPATIENT
Start: 2025-08-13

## 2025-06-16 RX ORDER — METHYLPHENIDATE HYDROCHLORIDE EXTENDED RELEASE 10 MG/1
10 TABLET ORAL 3 TIMES DAILY PRN
Qty: 90 TABLET | Refills: 0 | Status: SHIPPED | OUTPATIENT
Start: 2025-07-16

## 2025-06-16 RX ORDER — GABAPENTIN 100 MG/1
CAPSULE ORAL
Qty: 180 CAPSULE | Refills: 2 | Status: SHIPPED | OUTPATIENT
Start: 2025-06-16

## 2025-06-16 ASSESSMENT — ANXIETY QUESTIONNAIRES
7. FEELING AFRAID AS IF SOMETHING AWFUL MIGHT HAPPEN: NEARLY EVERY DAY
6. BECOMING EASILY ANNOYED OR IRRITABLE: NOT AT ALL
7. FEELING AFRAID AS IF SOMETHING AWFUL MIGHT HAPPEN: NEARLY EVERY DAY
2. NOT BEING ABLE TO STOP OR CONTROL WORRYING: MORE THAN HALF THE DAYS
IF YOU CHECKED OFF ANY PROBLEMS ON THIS QUESTIONNAIRE, HOW DIFFICULT HAVE THESE PROBLEMS MADE IT FOR YOU TO DO YOUR WORK, TAKE CARE OF THINGS AT HOME, OR GET ALONG WITH OTHER PEOPLE: SOMEWHAT DIFFICULT
4. TROUBLE RELAXING: NOT AT ALL
3. WORRYING TOO MUCH ABOUT DIFFERENT THINGS: SEVERAL DAYS
GAD7 TOTAL SCORE: 7
8. IF YOU CHECKED OFF ANY PROBLEMS, HOW DIFFICULT HAVE THESE MADE IT FOR YOU TO DO YOUR WORK, TAKE CARE OF THINGS AT HOME, OR GET ALONG WITH OTHER PEOPLE?: SOMEWHAT DIFFICULT
1. FEELING NERVOUS, ANXIOUS, OR ON EDGE: SEVERAL DAYS
5. BEING SO RESTLESS THAT IT IS HARD TO SIT STILL: NOT AT ALL

## 2025-06-16 ASSESSMENT — PATIENT HEALTH QUESTIONNAIRE - PHQ9
SUM OF ALL RESPONSES TO PHQ QUESTIONS 1-9: 3
10. IF YOU CHECKED OFF ANY PROBLEMS, HOW DIFFICULT HAVE THESE PROBLEMS MADE IT FOR YOU TO DO YOUR WORK, TAKE CARE OF THINGS AT HOME, OR GET ALONG WITH OTHER PEOPLE: SOMEWHAT DIFFICULT
SUM OF ALL RESPONSES TO PHQ QUESTIONS 1-9: 3

## 2025-06-16 ASSESSMENT — PAIN SCALES - GENERAL: PAINLEVEL_OUTOF10: MODERATE PAIN (5)

## 2025-06-16 NOTE — PATIENT INSTRUCTIONS
**For crisis resources, please see the information at the end of this document**   Patient Education    Thank you for coming to the [facility] [place] [facility].     Lab Testing:  If you had lab testing today and your results are reassuring or normal they will be mailed to you or sent through Squee within 7 days. If the lab tests need quick action we will call you with the results. The phone number we will call with results is # [phone]. If this is not the best number please call our clinic and change the number.     Medication Refills:  If you need any refills please call your pharmacy and they will contact us. Our fax number for refills is [phone].   Three business days of notice are needed for general medication refill requests.   Five business days of notice are needed for controlled substance refill requests.   If you need to change to a different pharmacy, please contact the new pharmacy directly. The new pharmacy will help you get your medications transferred.     Contact Us:  Please call [phone] during business hours (8-5:00 M-F).   If you have medication related questions after clinic hours, or on the weekend, please call [phone].     Financial Assistance [phone]   Medical Records [phone]       MENTAL HEALTH CRISIS RESOURCES:  For a emergency help, please call 911 or go to the nearest Emergency Department.     Emergency Walk-In Options:   EmPATH Unit @ [facility] ([place]): [phone] - Specialized mental health emergency area designed to be calming  [facility] ([place]): [phone]  [facility] ([place]): [phone]  [facility]): [phone]    [facility] Crisis Information:   [place]: [phone]  [place]: [phone]  [place] ([place]) - Adult: [phone]     Child: [phone]  [place] - Adult: [phone]     Child: [phone]  [place]:  [phone]  List of all [facility] resources:   https://[place].gov/dhs/people-we-serve/adults/health-care/mental-health/resources/crisis-contacts.jsp    National Crisis Information:   Crisis Text Line: Text  [place]  to 947874  Suicide & Crisis Lifeline: 988  National Suicide Prevention Lifeline: 4-905-165-TALK (1-550.941.6117)       For online chat options, visit https://suicidepreventionlifeline.org/chat/  Poison Control Center: 3-021-868-4960  Trans Lifeline: 5-569-831-6217 - Hotline for transgender people of all ages  The Dario Project: 7-422-217-7722 - Hotline for LGBT youth     For Non-Emergency Support:   Fast Tracker: Mental Health & Substance Use Disorder Resources -   https://www.[employer].org/

## 2025-06-16 NOTE — NURSING NOTE
Current patient location: [place]    Is the patient currently in the state of [place]? YES    Visit mode: CONVERT TO TELEPHONE    If the visit is dropped, the patient can be reconnected by:VIDEO VISIT: Text to cell phone:   Telephone Information:   Mobile [phone]       Will anyone else be joining the visit? NO  (If patient encounters technical issues they should call [phone])    Are changes needed to the allergy or medication list? Yes please remove duplicate meds from list:   -methylphenidate (METADATE ER) 10 MG CR tablet  (2 Total Duplicates)    Are refills needed on medications prescribed by this physician? YES    Rooming Documentation:  Questionnaire(s) completed    Reason for visit: MANJINDER SOLISF